# Patient Record
Sex: FEMALE | Race: WHITE | NOT HISPANIC OR LATINO | Employment: OTHER | URBAN - METROPOLITAN AREA
[De-identification: names, ages, dates, MRNs, and addresses within clinical notes are randomized per-mention and may not be internally consistent; named-entity substitution may affect disease eponyms.]

---

## 2017-02-15 ENCOUNTER — CONVERSION ENCOUNTER (OUTPATIENT)
Dept: RHEUMATOLOGY | Facility: CLINIC | Age: 77
End: 2017-02-15

## 2017-06-15 ENCOUNTER — CONVERSION ENCOUNTER (OUTPATIENT)
Dept: RHEUMATOLOGY | Facility: CLINIC | Age: 77
End: 2017-06-15

## 2017-06-17 ENCOUNTER — CONVERSION ENCOUNTER (OUTPATIENT)
Dept: RHEUMATOLOGY | Facility: CLINIC | Age: 77
End: 2017-06-17

## 2017-06-17 LAB
ALBUMIN SERPL-MCNC: 3.8 G/DL (ref 3.6–5.1)
ALBUMIN/GLOB SERPL: ABNORMAL {RATIO} (ref 1–2.5)
ALP SERPL-CCNC: 85 UNITS/L (ref 33–130)
ALT SERPL-CCNC: 19 UNITS/L (ref 6–29)
AST SERPL-CCNC: 19 UNITS/L (ref 10–35)
BILIRUB SERPL-MCNC: 0.5 MG/DL (ref 0.2–1.2)
BUN SERPL-MCNC: 14 MG/DL (ref 7–25)
BUN/CREAT SERPL: ABNORMAL (ref 6–22)
CALCIUM SERPL-MCNC: 9.2 MG/DL (ref 8.6–10.4)
CHLORIDE SERPL-SCNC: 102 MMOL/L (ref 98–110)
CK SERPL-CCNC: 89 UNITS/L (ref 29–143)
CO2 CONTENT VENOUS: 29 MMOL/L (ref 20–31)
CONV TOTAL PROTEIN: 6.2 G/DL (ref 6.1–8.1)
CREAT UR-MCNC: 0.91 MG/DL (ref 0.6–0.93)
CRP SERPL-MCNC: 0.39 MG/DL
CYCLIC CITRULLINATED PEPTIDE ANTIBODY: ABNORMAL
ERYTHROCYTE [DISTWIDTH] IN BLOOD BY AUTOMATED COUNT: 15.1 % (ref 11–15)
ERYTHROCYTE [SEDIMENTATION RATE] IN BLOOD BY WESTERGREN METHOD: 29 MM/HR
GLOBULIN UR ELPH-MCNC: ABNORMAL G/DL (ref 1.9–3.7)
GLUCOSE SERPL-MCNC: 103 MG/DL (ref 65–99)
HCT VFR BLD AUTO: 31.1 % (ref 35–45)
HGB BLD-MCNC: 9.5 G/DL (ref 11.7–15.5)
MCH RBC QN AUTO: 23.6 PG (ref 27–33)
MCHC RBC AUTO-ENTMCNC: ABNORMAL % (ref 32–36)
MCV RBC AUTO: 77.4 FL (ref 80–100)
PLATELET # BLD AUTO: ABNORMAL 10*3/MM3 (ref 140–400)
PMV BLD AUTO: 9.6 FL (ref 7.5–12.5)
POTASSIUM SERPL-SCNC: 4.2 MMOL/L (ref 3.5–5.3)
RBC # BLD AUTO: ABNORMAL 10*6/MM3 (ref 3.8–5.1)
SODIUM SERPL-SCNC: 139 MMOL/L (ref 135–146)
TSH SERPL-ACNC: 1.94 MICROINTL UNITS/ML (ref 0.4–4.5)
URATE SERPL-MCNC: 6.8 MG/DL (ref 2.5–7)
WBC # BLD AUTO: ABNORMAL K/UL (ref 3.8–10.8)

## 2017-06-28 ENCOUNTER — HOSPITAL ENCOUNTER (OUTPATIENT)
Dept: MAMMOGRAPHY | Facility: HOSPITAL | Age: 77
Discharge: HOME | End: 2017-06-28
Attending: INTERNAL MEDICINE | Admitting: INTERNAL MEDICINE

## 2017-07-26 ENCOUNTER — HOSPITAL ENCOUNTER (OUTPATIENT)
Dept: CARDIOLOGY | Facility: HOSPITAL | Age: 77
Discharge: HOME | End: 2017-07-26
Attending: INTERNAL MEDICINE | Admitting: INTERNAL MEDICINE

## 2017-07-26 ENCOUNTER — CONVERSION ENCOUNTER (OUTPATIENT)
Dept: RHEUMATOLOGY | Facility: CLINIC | Age: 77
End: 2017-07-26

## 2017-08-28 ENCOUNTER — CONVERSION ENCOUNTER (OUTPATIENT)
Dept: RHEUMATOLOGY | Facility: CLINIC | Age: 77
End: 2017-08-28

## 2017-09-21 ENCOUNTER — HOSPITAL ENCOUNTER (OUTPATIENT)
Dept: SLEEP MEDICINE | Facility: HOSPITAL | Age: 77
Discharge: HOME | End: 2017-09-21
Attending: INTERNAL MEDICINE | Admitting: INTERNAL MEDICINE

## 2017-09-28 ENCOUNTER — HOSPITAL ENCOUNTER (OUTPATIENT)
Dept: OTHER | Facility: HOSPITAL | Age: 77
Discharge: HOME | End: 2017-09-28
Attending: INTERNAL MEDICINE | Admitting: INTERNAL MEDICINE

## 2017-09-29 ENCOUNTER — CONVERSION ENCOUNTER (OUTPATIENT)
Dept: RHEUMATOLOGY | Facility: CLINIC | Age: 77
End: 2017-09-29

## 2017-10-02 ENCOUNTER — HOSPITAL ENCOUNTER (OUTPATIENT)
Dept: SLEEP MEDICINE | Facility: HOSPITAL | Age: 77
Discharge: HOME | End: 2017-10-02
Attending: INTERNAL MEDICINE | Admitting: INTERNAL MEDICINE

## 2017-10-13 ENCOUNTER — CONVERSION ENCOUNTER (OUTPATIENT)
Dept: RHEUMATOLOGY | Facility: CLINIC | Age: 77
End: 2017-10-13

## 2018-01-11 ENCOUNTER — CONVERSION ENCOUNTER (OUTPATIENT)
Dept: RHEUMATOLOGY | Facility: CLINIC | Age: 78
End: 2018-01-11

## 2018-01-11 LAB
ALBUMIN SERPL-MCNC: 4.4 G/DL (ref 3.6–5.1)
ALBUMIN/GLOB SERPL: ABNORMAL {RATIO} (ref 1–2.5)
ALP SERPL-CCNC: 108 UNITS/L (ref 33–130)
ALT SERPL-CCNC: 15 UNITS/L (ref 6–29)
AST SERPL-CCNC: 19 UNITS/L (ref 10–35)
BILIRUB SERPL-MCNC: 1.1 MG/DL (ref 0.2–1.2)
BUN SERPL-MCNC: 15 MG/DL (ref 7–25)
BUN/CREAT SERPL: ABNORMAL (ref 6–22)
CALCIUM SERPL-MCNC: 9.4 MG/DL (ref 8.6–10.4)
CHLORIDE SERPL-SCNC: 100 MMOL/L (ref 98–110)
CK SERPL-CCNC: 83 UNITS/L (ref 29–143)
CO2 CONTENT VENOUS: 28 MMOL/L (ref 20–31)
CONV TOTAL PROTEIN: 6.9 G/DL (ref 6.1–8.1)
CREAT UR-MCNC: 1.11 MG/DL (ref 0.6–0.93)
CRP SERPL-MCNC: 0.91 MG/DL
CYCLIC CITRULLINATED PEPTIDE ANTIBODY: ABNORMAL
ERYTHROCYTE [DISTWIDTH] IN BLOOD BY AUTOMATED COUNT: 16.3 % (ref 11–15)
ERYTHROCYTE [SEDIMENTATION RATE] IN BLOOD BY WESTERGREN METHOD: 25 MM/HR
GLOBULIN UR ELPH-MCNC: ABNORMAL G/DL (ref 1.9–3.7)
GLUCOSE SERPL-MCNC: 106 MG/DL (ref 65–99)
HCT VFR BLD AUTO: 30.1 % (ref 35–45)
HGB BLD-MCNC: 8.9 G/DL (ref 11.7–15.5)
MCH RBC QN AUTO: 21.1 PG (ref 27–33)
MCHC RBC AUTO-ENTMCNC: ABNORMAL % (ref 32–36)
MCV RBC AUTO: 71.3 FL (ref 80–100)
PLATELET # BLD AUTO: ABNORMAL 10*3/MM3 (ref 140–400)
PMV BLD AUTO: 9.6 FL (ref 7.5–12.5)
POTASSIUM SERPL-SCNC: 3.9 MMOL/L (ref 3.5–5.3)
RBC # BLD AUTO: ABNORMAL 10*6/MM3 (ref 3.8–5.1)
SODIUM SERPL-SCNC: 139 MMOL/L (ref 135–146)
URATE SERPL-MCNC: 8.3 MG/DL (ref 2.5–7)
WBC # BLD AUTO: ABNORMAL K/UL (ref 3.8–10.8)

## 2018-01-18 ENCOUNTER — CONVERSION ENCOUNTER (OUTPATIENT)
Dept: RHEUMATOLOGY | Facility: CLINIC | Age: 78
End: 2018-01-18

## 2018-03-15 ENCOUNTER — CONVERSION ENCOUNTER (OUTPATIENT)
Dept: RHEUMATOLOGY | Facility: CLINIC | Age: 78
End: 2018-03-15

## 2018-08-15 LAB
ALBUMIN SERPL-MCNC: 4.3 G/DL (ref 3.6–5.1)
ALBUMIN/GLOB SERPL: ABNORMAL {RATIO} (ref 1–2.5)
ALP SERPL-CCNC: 94 UNITS/L (ref 33–130)
ALT SERPL-CCNC: 24 UNITS/L (ref 6–29)
AST SERPL-CCNC: 27 UNITS/L (ref 10–35)
BASOPHILS # BLD AUTO: ABNORMAL 10*3/MM3 (ref 0–200)
BASOPHILS NFR BLD AUTO: 0.3 %
BILIRUB SERPL-MCNC: 0.8 MG/DL (ref 0.2–1.2)
BUN SERPL-MCNC: 10 MG/DL (ref 7–25)
BUN/CREAT SERPL: ABNORMAL (ref 6–22)
CALCIUM SERPL-MCNC: 9.2 MG/DL (ref 8.6–10.4)
CHLORIDE SERPL-SCNC: 99 MMOL/L (ref 98–110)
CO2 CONTENT VENOUS: 29 MMOL/L (ref 20–32)
CONV NEUTROPHILS/100 LEUKOCYTES IN BODY FLUID BY MANUAL COUNT: 70.7 %
CONV TOTAL PROTEIN: 6.9 G/DL (ref 6.1–8.1)
CREAT UR-MCNC: 0.86 MG/DL (ref 0.6–0.93)
CRP SERPL-MCNC: 0.53 MG/DL
EOSINOPHIL # BLD AUTO: 2.1 %
EOSINOPHIL # BLD AUTO: ABNORMAL 10*3/MM3 (ref 15–500)
ERYTHROCYTE [DISTWIDTH] IN BLOOD BY AUTOMATED COUNT: 14.9 % (ref 11–15)
ERYTHROCYTE [SEDIMENTATION RATE] IN BLOOD BY WESTERGREN METHOD: 28 MM/HR
GLOBULIN UR ELPH-MCNC: ABNORMAL G/DL (ref 1.9–3.7)
GLUCOSE SERPL-MCNC: 102 MG/DL (ref 65–99)
HCT VFR BLD AUTO: 40.8 % (ref 35–45)
HGB BLD-MCNC: 13.5 G/DL (ref 11.7–15.5)
LYMPHOCYTES # BLD AUTO: ABNORMAL 10*3/MM3 (ref 850–3900)
LYMPHOCYTES NFR BLD AUTO: 19.2 %
MCH RBC QN AUTO: 30.6 PG (ref 27–33)
MCHC RBC AUTO-ENTMCNC: ABNORMAL % (ref 32–36)
MCV RBC AUTO: 92.5 FL (ref 80–100)
MONOCYTES # BLD AUTO: ABNORMAL 10*3/MICROLITER (ref 200–950)
MONOCYTES NFR BLD AUTO: 7.7 %
NEUTROPHILS # BLD AUTO: ABNORMAL 10*3/MM3 (ref 1500–7800)
PLATELET # BLD AUTO: ABNORMAL 10*3/MM3 (ref 140–400)
PMV BLD AUTO: 9.9 FL (ref 7.5–12.5)
POTASSIUM SERPL-SCNC: 3.7 MMOL/L (ref 3.5–5.3)
RBC # BLD AUTO: ABNORMAL 10*6/MM3 (ref 3.8–5.1)
SODIUM SERPL-SCNC: 140 MMOL/L (ref 135–146)
WBC # BLD AUTO: ABNORMAL K/UL (ref 3.8–10.8)

## 2018-08-21 ENCOUNTER — CONVERSION ENCOUNTER (OUTPATIENT)
Dept: RHEUMATOLOGY | Facility: CLINIC | Age: 78
End: 2018-08-21

## 2018-08-21 LAB — URATE SERPL-MCNC: 6.3 MG/DL (ref 2.5–7)

## 2018-11-14 ENCOUNTER — CONVERSION ENCOUNTER (OUTPATIENT)
Dept: RHEUMATOLOGY | Facility: CLINIC | Age: 78
End: 2018-11-14

## 2019-03-06 LAB
ALBUMIN SERPL-MCNC: 3.9 G/DL (ref 3.6–5.1)
ALBUMIN/GLOB SERPL: ABNORMAL {RATIO} (ref 1–2.5)
ALP SERPL-CCNC: 89 UNITS/L (ref 33–130)
ALT SERPL-CCNC: 15 UNITS/L (ref 6–29)
AST SERPL-CCNC: 17 UNITS/L (ref 10–35)
BASOPHILS # BLD AUTO: ABNORMAL 10*3/MM3 (ref 0–200)
BASOPHILS NFR BLD AUTO: 0.2 %
BILIRUB SERPL-MCNC: 0.9 MG/DL (ref 0.2–1.2)
BUN SERPL-MCNC: 16 MG/DL (ref 7–25)
BUN/CREAT SERPL: ABNORMAL (ref 6–22)
CALCIUM SERPL-MCNC: 9.2 MG/DL (ref 8.6–10.4)
CHLORIDE SERPL-SCNC: 102 MMOL/L (ref 98–110)
CO2 CONTENT VENOUS: 26 MMOL/L (ref 20–32)
CONV NEUTROPHILS/100 LEUKOCYTES IN BODY FLUID BY MANUAL COUNT: 74.9 %
CONV TOTAL PROTEIN: 6.2 G/DL (ref 6.1–8.1)
CREAT UR-MCNC: 0.8 MG/DL (ref 0.6–0.93)
CRP SERPL-MCNC: 0.41 MG/DL
EOSINOPHIL # BLD AUTO: 1.8 %
EOSINOPHIL # BLD AUTO: ABNORMAL 10*3/MM3 (ref 15–500)
ERYTHROCYTE [DISTWIDTH] IN BLOOD BY AUTOMATED COUNT: 14.4 % (ref 11–15)
ERYTHROCYTE [SEDIMENTATION RATE] IN BLOOD BY WESTERGREN METHOD: 22 MM/HR
GLOBULIN UR ELPH-MCNC: ABNORMAL G/DL (ref 1.9–3.7)
GLUCOSE SERPL-MCNC: 89 MG/DL (ref 65–99)
HCT VFR BLD AUTO: 30.4 % (ref 35–45)
HGB BLD-MCNC: 10 G/DL (ref 11.7–15.5)
LYMPHOCYTES # BLD AUTO: ABNORMAL 10*3/MM3 (ref 850–3900)
LYMPHOCYTES NFR BLD AUTO: 13.5 %
MCH RBC QN AUTO: 27.8 PG (ref 27–33)
MCHC RBC AUTO-ENTMCNC: ABNORMAL % (ref 32–36)
MCV RBC AUTO: 84.4 FL (ref 80–100)
MONOCYTES # BLD AUTO: ABNORMAL 10*3/MICROLITER (ref 200–950)
MONOCYTES NFR BLD AUTO: 9.6 %
NEUTROPHILS # BLD AUTO: ABNORMAL 10*3/MM3 (ref 1500–7800)
PLATELET # BLD AUTO: ABNORMAL 10*3/MM3 (ref 140–400)
PMV BLD AUTO: 10 FL (ref 7.5–12.5)
POTASSIUM SERPL-SCNC: 4.4 MMOL/L (ref 3.5–5.3)
RBC # BLD AUTO: ABNORMAL 10*6/MM3 (ref 3.8–5.1)
SODIUM SERPL-SCNC: 137 MMOL/L (ref 135–146)
URATE SERPL-MCNC: 7.4 MG/DL (ref 2.5–7)
WBC # BLD AUTO: ABNORMAL K/UL (ref 3.8–10.8)

## 2019-03-14 ENCOUNTER — CONVERSION ENCOUNTER (OUTPATIENT)
Dept: RHEUMATOLOGY | Facility: CLINIC | Age: 79
End: 2019-03-14

## 2019-05-17 ENCOUNTER — HOSPITAL ENCOUNTER (OUTPATIENT)
Dept: LAB | Facility: HOSPITAL | Age: 79
Discharge: HOME | End: 2019-05-17
Attending: PHYSICIAN ASSISTANT | Admitting: PHYSICIAN ASSISTANT

## 2019-05-17 LAB
ALBUMIN SERPL-MCNC: 3.9 G/DL (ref 3.5–4.8)
ALBUMIN/GLOB SERPL: 1.4 {RATIO} (ref 1–1.7)
ALP SERPL-CCNC: 72 IU/L (ref 32–91)
ALT SERPL-CCNC: 16 IU/L (ref 14–54)
ANION GAP SERPL CALC-SCNC: 17.1 MMOL/L (ref 10–20)
AST SERPL-CCNC: 20 IU/L (ref 15–41)
BASOPHILS # BLD AUTO: 0 10*3/UL (ref 0–0.2)
BASOPHILS NFR BLD AUTO: 0 % (ref 0–2)
BILIRUB SERPL-MCNC: 1 MG/DL (ref 0.3–1.2)
BNP SERPL-MCNC: 353 PG/ML
BUN SERPL-MCNC: 15 MG/DL (ref 8–20)
BUN/CREAT SERPL: 16.7 (ref 5.4–26.2)
CALCIUM SERPL-MCNC: 8.4 MG/DL (ref 8.9–10.3)
CHLORIDE SERPL-SCNC: 99 MMOL/L (ref 101–111)
CK MB CFR SERPL ELPH: 1.4 NG/ML (ref 0.6–6.3)
CONV CO2: 23 MMOL/L (ref 22–32)
CONV TOTAL PROTEIN: 6.7 G/DL (ref 6.1–7.9)
CREAT UR-MCNC: 0.9 MG/DL (ref 0.4–1)
D DIMER PPP FEU-MCNC: 0.35 MG/L FEU (ref 0.17–0.59)
DIFFERENTIAL METHOD BLD: (no result)
EOSINOPHIL # BLD AUTO: 0.1 10*3/UL (ref 0–0.3)
EOSINOPHIL # BLD AUTO: 2 % (ref 0–3)
ERYTHROCYTE [DISTWIDTH] IN BLOOD BY AUTOMATED COUNT: 15.6 % (ref 11.5–14.5)
GLOBULIN UR ELPH-MCNC: 2.8 G/DL (ref 2.5–3.8)
GLUCOSE SERPL-MCNC: 100 MG/DL (ref 65–99)
HCT VFR BLD AUTO: 30.2 % (ref 35–49)
HGB BLD-MCNC: 9.6 G/DL (ref 12–15)
LYMPHOCYTES # BLD AUTO: 0.7 10*3/UL (ref 0.8–4.8)
LYMPHOCYTES NFR BLD AUTO: 12 % (ref 18–42)
MCH RBC QN AUTO: 25.1 PG (ref 26–32)
MCHC RBC AUTO-ENTMCNC: 31.7 G/DL (ref 32–36)
MCV RBC AUTO: 79.1 FL (ref 80–94)
MONOCYTES # BLD AUTO: 0.6 10*3/UL (ref 0.1–1.3)
MONOCYTES NFR BLD AUTO: 10 % (ref 2–11)
NEUTROPHILS # BLD AUTO: 4.5 10*3/UL (ref 2.3–8.6)
NEUTROPHILS NFR BLD AUTO: 76 % (ref 50–75)
NRBC BLD AUTO-RTO: 0 /100{WBCS}
NRBC/RBC NFR BLD MANUAL: 0 10*3/UL
PLATELET # BLD AUTO: 269 10*3/UL (ref 150–450)
PMV BLD AUTO: 7 FL (ref 7.4–10.4)
POTASSIUM SERPL-SCNC: 4.1 MMOL/L (ref 3.6–5.1)
RBC # BLD AUTO: 3.81 10*6/UL (ref 4–5.4)
SODIUM SERPL-SCNC: 135 MMOL/L (ref 136–144)
TROPONIN I SERPL-MCNC: <0.03 NG/ML (ref 0–0.03)
WBC # BLD AUTO: 5.9 10*3/UL (ref 4.5–11.5)

## 2019-06-04 VITALS
DIASTOLIC BLOOD PRESSURE: 81 MMHG | SYSTOLIC BLOOD PRESSURE: 105 MMHG | HEIGHT: 65 IN | HEIGHT: 65 IN | BODY MASS INDEX: 48.82 KG/M2 | BODY MASS INDEX: 48.82 KG/M2 | SYSTOLIC BLOOD PRESSURE: 115 MMHG | WEIGHT: 293 LBS | DIASTOLIC BLOOD PRESSURE: 81 MMHG | DIASTOLIC BLOOD PRESSURE: 79 MMHG | DIASTOLIC BLOOD PRESSURE: 50 MMHG | HEART RATE: 93 BPM | HEART RATE: 65 BPM | HEART RATE: 80 BPM | HEART RATE: 76 BPM | WEIGHT: 283 LBS | SYSTOLIC BLOOD PRESSURE: 136 MMHG | WEIGHT: 293 LBS | RESPIRATION RATE: 20 BRPM | HEART RATE: 74 BPM | BODY MASS INDEX: 49.46 KG/M2 | WEIGHT: 293 LBS | HEART RATE: 83 BPM | WEIGHT: 293 LBS | DIASTOLIC BLOOD PRESSURE: 77 MMHG | SYSTOLIC BLOOD PRESSURE: 135 MMHG | DIASTOLIC BLOOD PRESSURE: 76 MMHG | SYSTOLIC BLOOD PRESSURE: 147 MMHG | SYSTOLIC BLOOD PRESSURE: 129 MMHG | WEIGHT: 276 LBS | DIASTOLIC BLOOD PRESSURE: 82 MMHG | WEIGHT: 293 LBS | WEIGHT: 293 LBS | DIASTOLIC BLOOD PRESSURE: 73 MMHG | BODY MASS INDEX: 47.15 KG/M2 | HEIGHT: 65 IN | SYSTOLIC BLOOD PRESSURE: 176 MMHG | SYSTOLIC BLOOD PRESSURE: 134 MMHG | HEIGHT: 65 IN | HEART RATE: 69 BPM | SYSTOLIC BLOOD PRESSURE: 136 MMHG | HEART RATE: 80 BPM | HEIGHT: 65 IN | BODY MASS INDEX: 48.82 KG/M2 | WEIGHT: 293 LBS | HEART RATE: 67 BPM | OXYGEN SATURATION: 97 % | BODY MASS INDEX: 48.82 KG/M2 | DIASTOLIC BLOOD PRESSURE: 93 MMHG | WEIGHT: 293 LBS

## 2019-06-04 VITALS
SYSTOLIC BLOOD PRESSURE: 158 MMHG | DIASTOLIC BLOOD PRESSURE: 82 MMHG | HEART RATE: 61 BPM | BODY MASS INDEX: 46.98 KG/M2 | HEIGHT: 65 IN | HEART RATE: 54 BPM | WEIGHT: 285.8 LBS | WEIGHT: 282 LBS | OXYGEN SATURATION: 99 % | SYSTOLIC BLOOD PRESSURE: 137 MMHG | DIASTOLIC BLOOD PRESSURE: 71 MMHG | BODY MASS INDEX: 47.56 KG/M2

## 2019-06-19 ENCOUNTER — TRANSCRIBE ORDERS (OUTPATIENT)
Dept: ADMINISTRATIVE | Facility: HOSPITAL | Age: 79
End: 2019-06-19

## 2019-06-19 DIAGNOSIS — M81.0 AGE RELATED OSTEOPOROSIS, UNSPECIFIED PATHOLOGICAL FRACTURE PRESENCE: Primary | ICD-10-CM

## 2019-07-05 ENCOUNTER — HOSPITAL ENCOUNTER (OUTPATIENT)
Dept: BONE DENSITY | Facility: HOSPITAL | Age: 79
Discharge: HOME | End: 2019-07-05
Admitting: NURSE PRACTITIONER

## 2019-07-05 DIAGNOSIS — M81.0 AGE RELATED OSTEOPOROSIS, UNSPECIFIED PATHOLOGICAL FRACTURE PRESENCE: ICD-10-CM

## 2019-07-05 PROCEDURE — 77080 DXA BONE DENSITY AXIAL: CPT

## 2019-07-09 ENCOUNTER — OFFICE (AMBULATORY)
Dept: URBAN - METROPOLITAN AREA CLINIC 64 | Facility: CLINIC | Age: 79
End: 2019-07-09

## 2019-07-09 VITALS
HEART RATE: 54 BPM | HEIGHT: 66 IN | DIASTOLIC BLOOD PRESSURE: 71 MMHG | SYSTOLIC BLOOD PRESSURE: 124 MMHG | WEIGHT: 290 LBS

## 2019-07-09 DIAGNOSIS — D64.9 ANEMIA, UNSPECIFIED: ICD-10-CM

## 2019-07-09 DIAGNOSIS — R10.84 GENERALIZED ABDOMINAL PAIN: ICD-10-CM

## 2019-07-09 PROCEDURE — 99214 OFFICE O/P EST MOD 30 MIN: CPT | Performed by: NURSE PRACTITIONER

## 2019-08-06 PROBLEM — I73.9 PERIPHERAL VASCULAR DISEASE (HCC): Status: ACTIVE | Noted: 2019-08-06

## 2019-08-06 RX ORDER — HYDROXYCHLOROQUINE SULFATE 200 MG/1
TABLET, FILM COATED ORAL 2 TIMES DAILY
COMMUNITY
Start: 2019-06-13 | End: 2019-09-24

## 2019-08-06 RX ORDER — ACETAMINOPHEN 500 MG
2 TABLET ORAL DAILY
COMMUNITY
End: 2019-09-24

## 2019-08-06 RX ORDER — FEBUXOSTAT 40 MG/1
TABLET ORAL DAILY
COMMUNITY
Start: 2019-05-31 | End: 2019-09-24

## 2019-08-06 RX ORDER — BUMETANIDE 1 MG/1
2 TABLET ORAL DAILY
COMMUNITY
Start: 2019-06-13

## 2019-08-06 RX ORDER — AMLODIPINE BESYLATE 10 MG/1
TABLET ORAL DAILY
COMMUNITY
Start: 2019-05-07 | End: 2020-06-11

## 2019-08-06 RX ORDER — OMEPRAZOLE 40 MG/1
CAPSULE, DELAYED RELEASE ORAL DAILY
COMMUNITY
Start: 2019-05-24 | End: 2020-06-11

## 2019-08-06 RX ORDER — CHOLECALCIFEROL (VITAMIN D3) 25 MCG
1000 TABLET,CHEWABLE ORAL
COMMUNITY
Start: 2014-03-24 | End: 2020-07-25

## 2019-08-06 RX ORDER — RIVAROXABAN 20 MG/1
TABLET, FILM COATED ORAL
COMMUNITY
Start: 2019-06-13 | End: 2020-06-08

## 2019-08-06 RX ORDER — ESCITALOPRAM OXALATE 10 MG/1
10 TABLET ORAL DAILY
COMMUNITY
Start: 2019-04-30 | End: 2021-07-19

## 2019-08-06 RX ORDER — LEVOTHYROXINE SODIUM 100 MCG
100 TABLET ORAL DAILY
COMMUNITY
Start: 2019-05-31 | End: 2019-09-24 | Stop reason: SDUPTHER

## 2019-08-06 RX ORDER — GABAPENTIN 100 MG/1
CAPSULE ORAL
COMMUNITY
Start: 2019-05-23 | End: 2019-08-27

## 2019-08-06 RX ORDER — METOPROLOL TARTRATE 100 MG/1
TABLET ORAL
COMMUNITY
Start: 2014-03-24 | End: 2021-07-19

## 2019-08-06 RX ORDER — VALSARTAN 160 MG/1
320 TABLET ORAL DAILY
COMMUNITY
Start: 2019-05-16 | End: 2020-06-11

## 2019-08-06 RX ORDER — NITROFURANTOIN 25; 75 MG/1; MG/1
CAPSULE ORAL
Refills: 0 | COMMUNITY
Start: 2019-05-09 | End: 2019-08-27

## 2019-08-06 RX ORDER — POTASSIUM CHLORIDE 750 MG/1
10 TABLET, FILM COATED, EXTENDED RELEASE ORAL DAILY
COMMUNITY
Start: 2019-06-13

## 2019-08-06 RX ORDER — CLONIDINE HYDROCHLORIDE 0.1 MG/1
TABLET ORAL
COMMUNITY
Start: 2022-03-11

## 2019-08-06 RX ORDER — FERROUS SULFATE 325(65) MG
325 TABLET ORAL
COMMUNITY
Start: 2018-10-12 | End: 2021-07-19

## 2019-08-06 RX ORDER — OMEPRAZOLE 20 MG/1
CAPSULE, DELAYED RELEASE ORAL
COMMUNITY
Start: 2014-03-24 | End: 2019-08-27

## 2019-08-06 RX ORDER — PRAVASTATIN SODIUM 40 MG
40 TABLET ORAL NIGHTLY
COMMUNITY
Start: 2014-03-24

## 2019-08-06 RX ORDER — ACETAMINOPHEN 160 MG
2000 TABLET,DISINTEGRATING ORAL
COMMUNITY
Start: 2014-03-24

## 2019-08-06 RX ORDER — CETIRIZINE HYDROCHLORIDE 10 MG/1
10 TABLET ORAL DAILY
COMMUNITY
Start: 2014-03-24 | End: 2019-08-27

## 2019-08-07 ENCOUNTER — OFFICE (AMBULATORY)
Dept: URBAN - METROPOLITAN AREA PATHOLOGY 4 | Facility: PATHOLOGY | Age: 79
End: 2019-08-07

## 2019-08-07 ENCOUNTER — LAB REQUISITION (OUTPATIENT)
Dept: LAB | Facility: HOSPITAL | Age: 79
End: 2019-08-07

## 2019-08-07 ENCOUNTER — ON CAMPUS - OUTPATIENT (AMBULATORY)
Dept: URBAN - METROPOLITAN AREA HOSPITAL 2 | Facility: HOSPITAL | Age: 79
End: 2019-08-07

## 2019-08-07 VITALS
HEART RATE: 59 BPM | DIASTOLIC BLOOD PRESSURE: 84 MMHG | HEART RATE: 58 BPM | RESPIRATION RATE: 18 BRPM | OXYGEN SATURATION: 100 % | SYSTOLIC BLOOD PRESSURE: 190 MMHG | OXYGEN SATURATION: 97 % | HEART RATE: 54 BPM | RESPIRATION RATE: 16 BRPM | OXYGEN SATURATION: 99 % | SYSTOLIC BLOOD PRESSURE: 113 MMHG | SYSTOLIC BLOOD PRESSURE: 120 MMHG | DIASTOLIC BLOOD PRESSURE: 86 MMHG | HEART RATE: 63 BPM | SYSTOLIC BLOOD PRESSURE: 161 MMHG | SYSTOLIC BLOOD PRESSURE: 114 MMHG | HEART RATE: 68 BPM | SYSTOLIC BLOOD PRESSURE: 128 MMHG | DIASTOLIC BLOOD PRESSURE: 72 MMHG | DIASTOLIC BLOOD PRESSURE: 61 MMHG | SYSTOLIC BLOOD PRESSURE: 138 MMHG | HEART RATE: 64 BPM | WEIGHT: 281 LBS | SYSTOLIC BLOOD PRESSURE: 125 MMHG | OXYGEN SATURATION: 98 % | OXYGEN SATURATION: 95 % | HEART RATE: 66 BPM | OXYGEN SATURATION: 92 % | DIASTOLIC BLOOD PRESSURE: 58 MMHG | HEART RATE: 57 BPM | SYSTOLIC BLOOD PRESSURE: 145 MMHG | TEMPERATURE: 97.8 F | DIASTOLIC BLOOD PRESSURE: 71 MMHG | DIASTOLIC BLOOD PRESSURE: 68 MMHG | SYSTOLIC BLOOD PRESSURE: 121 MMHG | HEART RATE: 70 BPM | DIASTOLIC BLOOD PRESSURE: 77 MMHG | HEIGHT: 66 IN | DIASTOLIC BLOOD PRESSURE: 82 MMHG | DIASTOLIC BLOOD PRESSURE: 88 MMHG

## 2019-08-07 DIAGNOSIS — D12.5 BENIGN NEOPLASM OF SIGMOID COLON: ICD-10-CM

## 2019-08-07 DIAGNOSIS — K64.0 FIRST DEGREE HEMORRHOIDS: ICD-10-CM

## 2019-08-07 DIAGNOSIS — D12.2 BENIGN NEOPLASM OF ASCENDING COLON: ICD-10-CM

## 2019-08-07 DIAGNOSIS — D50.0 IRON DEFICIENCY ANEMIA DUE TO CHRONIC BLOOD LOSS: ICD-10-CM

## 2019-08-07 DIAGNOSIS — K29.50 UNSPECIFIED CHRONIC GASTRITIS WITHOUT BLEEDING: ICD-10-CM

## 2019-08-07 DIAGNOSIS — K31.7 POLYP OF STOMACH AND DUODENUM: ICD-10-CM

## 2019-08-07 DIAGNOSIS — K57.30 DIVERTICULOSIS OF LARGE INTESTINE WITHOUT PERFORATION OR ABSCESS WITHOUT BLEEDING: ICD-10-CM

## 2019-08-07 DIAGNOSIS — D17.79 BENIGN LIPOMATOUS NEOPLASM OF OTHER SITES: ICD-10-CM

## 2019-08-07 DIAGNOSIS — R10.84 GENERALIZED ABDOMINAL PAIN: ICD-10-CM

## 2019-08-07 DIAGNOSIS — K63.5 POLYP OF COLON: ICD-10-CM

## 2019-08-07 DIAGNOSIS — K31.89 OTHER DISEASES OF STOMACH AND DUODENUM: ICD-10-CM

## 2019-08-07 DIAGNOSIS — D50.0 IRON DEFICIENCY ANEMIA SECONDARY TO BLOOD LOSS (CHRONIC): ICD-10-CM

## 2019-08-07 DIAGNOSIS — C18.2 MALIGNANT NEOPLASM OF ASCENDING COLON: ICD-10-CM

## 2019-08-07 DIAGNOSIS — K56.609 UNSPECIFIED INTESTINAL OBSTRUCTION, UNSPECIFIED AS TO PARTIA: ICD-10-CM

## 2019-08-07 DIAGNOSIS — K57.30 DIVERTICULOSIS OF LARGE INTESTINE WITHOUT PERFORATION OR ABS: ICD-10-CM

## 2019-08-07 PROBLEM — C18.4 MALIGNANT NEOPLASM OF TRANSVERSE COLON: Status: ACTIVE | Noted: 2019-08-07

## 2019-08-07 LAB
GI HISTOLOGY: A. UNSPECIFIED: (no result)
GI HISTOLOGY: B. SELECT: (no result)
GI HISTOLOGY: C. SELECT: (no result)
GI HISTOLOGY: D. UNSPECIFIED: (no result)
GI HISTOLOGY: E. UNSPECIFIED: (no result)
GI HISTOLOGY: PDF REPORT: (no result)

## 2019-08-07 PROCEDURE — 88342 IMHCHEM/IMCYTCHM 1ST ANTB: CPT

## 2019-08-07 PROCEDURE — 45385 COLONOSCOPY W/LESION REMOVAL: CPT | Performed by: INTERNAL MEDICINE

## 2019-08-07 PROCEDURE — 88341 IMHCHEM/IMCYTCHM EA ADD ANTB: CPT

## 2019-08-07 PROCEDURE — 88342 IMHCHEM/IMCYTCHM 1ST ANTB: CPT | Performed by: INTERNAL MEDICINE

## 2019-08-07 PROCEDURE — 88305 TISSUE EXAM BY PATHOLOGIST: CPT | Performed by: INTERNAL MEDICINE

## 2019-08-07 PROCEDURE — 43239 EGD BIOPSY SINGLE/MULTIPLE: CPT | Performed by: INTERNAL MEDICINE

## 2019-08-07 PROCEDURE — 45380 COLONOSCOPY AND BIOPSY: CPT | Mod: 59 | Performed by: INTERNAL MEDICINE

## 2019-08-07 PROCEDURE — 88305 TISSUE EXAM BY PATHOLOGIST: CPT | Mod: 26 | Performed by: INTERNAL MEDICINE

## 2019-08-07 PROCEDURE — 45381 COLONOSCOPY SUBMUCOUS NJX: CPT | Performed by: INTERNAL MEDICINE

## 2019-08-12 LAB
LAB AP CASE REPORT: NORMAL
LAB AP DIAGNOSIS COMMENT: NORMAL
PATH REPORT.ADDENDUM SPEC: NORMAL
PATH REPORT.FINAL DX SPEC: NORMAL
PATH REPORT.GROSS SPEC: NORMAL

## 2019-08-13 ENCOUNTER — OFFICE VISIT (OUTPATIENT)
Dept: SURGERY | Facility: CLINIC | Age: 79
End: 2019-08-13

## 2019-08-13 ENCOUNTER — PREP FOR SURGERY (OUTPATIENT)
Dept: OTHER | Facility: HOSPITAL | Age: 79
End: 2019-08-13

## 2019-08-13 VITALS
TEMPERATURE: 97.6 F | OXYGEN SATURATION: 97 % | DIASTOLIC BLOOD PRESSURE: 66 MMHG | HEART RATE: 59 BPM | SYSTOLIC BLOOD PRESSURE: 125 MMHG | HEIGHT: 63 IN | BODY MASS INDEX: 49.36 KG/M2 | WEIGHT: 278.6 LBS

## 2019-08-13 DIAGNOSIS — C18.2 MALIGNANT NEOPLASM OF ASCENDING COLON (HCC): Primary | ICD-10-CM

## 2019-08-13 PROBLEM — C18.9 COLON CANCER: Status: ACTIVE | Noted: 2019-08-13

## 2019-08-13 PROCEDURE — 99204 OFFICE O/P NEW MOD 45 MIN: CPT | Performed by: SURGERY

## 2019-08-13 RX ORDER — IRBESARTAN 150 MG/1
150 TABLET ORAL NIGHTLY
COMMUNITY
End: 2019-08-27

## 2019-08-13 RX ORDER — SODIUM CHLORIDE 9 MG/ML
100 INJECTION, SOLUTION INTRAVENOUS CONTINUOUS
Status: CANCELLED | OUTPATIENT
Start: 2019-08-13

## 2019-08-13 NOTE — H&P
Subjective   Yasmeen Rahman is a 79 y.o. female.     History of present illness  Yasmeen is a pleasant 79-year-old female seen in the office today at the request of Dr. Aayush Maurer for newly diagnosed ascending colon carcinoma.  It is an invasive moderately differentiated carcinoma.  She also had several of the polyps removed at the time of surgery.  She also had an EGD done.  Her CT scan shows no evidence of distant spread.  There are no abnormally enlarged lymph nodes either.    In the office today we have discussed a right-handed colectomy in detail.  We discussed the prep for that.  We also discussed the expected hospital stay staging and possibly for needing chemotherapy postop.  She sees oncology next week and her cardiologist next week as well.  She has a history of atrial fibrillation on the baby aspirin and Xarelto.  Will need to stop this preoperatively for 2 days.  Had endoscopy because she was found to be anemic.  She was started on iron replacement and anemia persisted so endoscopy was recommended by Dr. Figueredo her urologist.  He had done a bladder procedure.    No past medical history on file.    No past surgical history on file.    Outpatient Encounter Medications as of 8/13/2019   Medication Sig Dispense Refill   • Cranberry 125 MG tablet Take  by mouth.     • irbesartan (AVAPRO) 150 MG tablet Take 150 mg by mouth Every Night.       No facility-administered encounter medications on file as of 8/13/2019.        Allergies   Allergen Reactions   • Codeine Nausea And Vomiting   • Hydrocodone-Acetaminophen Nausea And Vomiting     Nausea,vomiting       No family history on file.    Social History     Socioeconomic History   • Marital status:      Spouse name: Not on file   • Number of children: Not on file   • Years of education: Not on file   • Highest education level: Not on file       The following portions of the patient's history were reviewed and updated as appropriate:  allergies, current medications, past family history, past medical history, past social history, past surgical history and problem list.    Objective       Assessment/Plan   There are no diagnoses linked to this encounter.      Complete review of systems is done and unremarkable with exception of the chief complaint.    Physical exam shows a obese 79-year-old female.  HEENT is negative.  Heart is regular.  Lungs are clear.  Abdomen is obese.  Nontender.  Extremity show equal range of motion in the upper and lower extremities.  She has symmetrical strength and usage.  Neuro shows no obvious focal deficit.    Impression: Ascending colon cancer      Plan: Open right colectomy after 2-day [place] bowel prep      Dominick Cunha,   8/13/2019  9:20 AM

## 2019-08-13 NOTE — PROGRESS NOTES
Subjective   Yasmeen Rahman is a 79 y.o. female.     History of present illness  Yasmeen is a pleasant 79-year-old female seen in the office today at the request of Dr. Aayush Maurer for newly diagnosed ascending colon carcinoma.  It is an invasive moderately differentiated carcinoma.  She also had several of the polyps removed at the time of surgery.  She also had an EGD done.  Her CT scan shows no evidence of distant spread.  There are no abnormally enlarged lymph nodes either.    In the office today we have discussed a right-handed colectomy in detail.  We discussed the prep for that.  We also discussed the expected hospital stay staging and possibly for needing chemotherapy postop.  She sees oncology next week and her cardiologist next week as well.  She has a history of atrial fibrillation on the baby aspirin and Xarelto.  Will need to stop this preoperatively for 2 days.  Had endoscopy because she was found to be anemic.  She was started on iron replacement and anemia persisted so endoscopy was recommended by Dr. Figueredo her urologist.  He had done a bladder procedure.    No past medical history on file.    No past surgical history on file.    Outpatient Encounter Medications as of 8/13/2019   Medication Sig Dispense Refill   • acetaminophen (TYLENOL) 500 MG tablet Take 2 tablets by mouth Daily.     • amLODIPine (NORVASC) 10 MG tablet      • aspirin 81 MG tablet Take 81 mg by mouth Daily.     • b complex vitamins tablet B COMPLEX TABS     • bumetanide (BUMEX) 2 MG tablet      • Calcium Carbonate-Vitamin D (CALCIUM 600+D) 600-200 MG-UNIT tablet Take 1,200 mg by mouth Daily.     • CALCIUM PO CALCIUM CAPS     • cetirizine (zyrTEC) 10 MG tablet Take 10 mg by mouth Daily.     • Cholecalciferol (VITAMIN D3) 2000 units capsule VITAMIN D3 2000 UNIT CAPS     • CloNIDine (CATAPRES) 0.1 MG tablet TAKE 1 TABLET TWICE A DAY     • Cranberry 125 MG tablet Take  by mouth.     • cyanocobalamin (VITAMIN B-12) 2500  MCG tablet tablet B-12 2500 MCG TABS     • escitalopram (LEXAPRO) 10 MG tablet      • ferrous sulfate 325 (65 FE) MG tablet TAKE 1 TABLET BY MOUTH 2 TIMES DAILY WITH MEALS     • gabapentin (NEURONTIN) 100 MG capsule      • hydroxychloroquine (PLAQUENIL) 200 MG tablet      • irbesartan (AVAPRO) 150 MG tablet Take 150 mg by mouth Every Night.     • metoprolol tartrate (LOPRESSOR) 100 MG tablet TAKE 1 TABLET TWICE A DAY     • omeprazole (priLOSEC) 20 MG capsule TAKE 1 CAPSULE DAILY     • potassium chloride (K-DUR) 10 MEQ CR tablet      • pravastatin (PRAVACHOL) 40 MG tablet Take 40 mg by mouth Daily.     • SYNTHROID 100 MCG tablet      • ULORIC 40 MG tablet      • valsartan (DIOVAN) 160 MG tablet      • XARELTO 20 MG tablet        No facility-administered encounter medications on file as of 8/13/2019.        Allergies   Allergen Reactions   • Codeine Nausea And Vomiting   • Hydrocodone-Acetaminophen Nausea And Vomiting     Nausea,vomiting       No family history on file.    Social History     Socioeconomic History   • Marital status:      Spouse name: Not on file   • Number of children: Not on file   • Years of education: Not on file   • Highest education level: Not on file       The following portions of the patient's history were reviewed and updated as appropriate: allergies, current medications, past family history, past medical history, past social history, past surgical history and problem list.    Objective       Assessment/Plan   There are no diagnoses linked to this encounter.      Complete review of systems is done and unremarkable with exception of the chief complaint.    Physical exam shows a obese 79-year-old female.  HEENT is negative.  Heart is regular.  Lungs are clear.  Abdomen is obese.  Nontender.  Extremity show equal range of motion in the upper and lower extremities.  She has symmetrical strength and usage.  Neuro shows no obvious focal deficit.    Impression: Ascending colon cancer       Plan: Open right colectomy after 2-day [facility] [place] bowel prep      Dominick Cunha DO  8/13/2019  9:14 AM

## 2019-08-15 DIAGNOSIS — M19.90 INFLAMMATORY ARTHRITIS: Primary | ICD-10-CM

## 2019-08-15 RX ORDER — GABAPENTIN 100 MG/1
100 CAPSULE ORAL
Qty: 450 CAPSULE | Refills: 0 | Status: SHIPPED
Start: 2019-08-15 | End: 2019-08-21 | Stop reason: SDUPTHER

## 2019-08-15 NOTE — TELEPHONE ENCOUNTER
Yasmeen is wanting a refill on gabapentin. I can not send this in this has to be sent by a doctor. If you are ok with sending this in you can sign

## 2019-08-16 ENCOUNTER — TELEPHONE (OUTPATIENT)
Dept: SURGERY | Facility: CLINIC | Age: 79
End: 2019-08-16

## 2019-08-16 NOTE — TELEPHONE ENCOUNTER
Pt has not been scheduled for surgery yet. Once she has clearance then we will set patient up with a surgery date. When I know the time of surgery, I will send in pre op antibiotics.

## 2019-08-20 ENCOUNTER — APPOINTMENT (OUTPATIENT)
Dept: LAB | Facility: HOSPITAL | Age: 79
End: 2019-08-20

## 2019-08-20 ENCOUNTER — CONSULT (OUTPATIENT)
Dept: ONCOLOGY | Facility: CLINIC | Age: 79
End: 2019-08-20

## 2019-08-20 VITALS — HEIGHT: 63 IN | BODY MASS INDEX: 49.04 KG/M2 | TEMPERATURE: 97.8 F | RESPIRATION RATE: 20 BRPM | WEIGHT: 276.8 LBS

## 2019-08-20 DIAGNOSIS — C18.9 ADENOCARCINOMA, COLON (HCC): Primary | ICD-10-CM

## 2019-08-20 DIAGNOSIS — R16.1 SPLENIC MASS: ICD-10-CM

## 2019-08-20 DIAGNOSIS — N18.4 STAGE 4 CHRONIC KIDNEY DISEASE (HCC): ICD-10-CM

## 2019-08-20 DIAGNOSIS — I48.20 CHRONIC ATRIAL FIBRILLATION (HCC): ICD-10-CM

## 2019-08-20 DIAGNOSIS — D50.0 IRON DEFICIENCY ANEMIA DUE TO CHRONIC BLOOD LOSS: ICD-10-CM

## 2019-08-20 DIAGNOSIS — R16.0 LIVER MASS: ICD-10-CM

## 2019-08-20 PROBLEM — D64.9 ANEMIA: Status: ACTIVE | Noted: 2019-08-20

## 2019-08-20 PROBLEM — N18.9 CKD (CHRONIC KIDNEY DISEASE): Status: ACTIVE | Noted: 2019-01-01

## 2019-08-20 PROCEDURE — G0463 HOSPITAL OUTPT CLINIC VISIT: HCPCS | Performed by: INTERNAL MEDICINE

## 2019-08-20 PROCEDURE — 99204 OFFICE O/P NEW MOD 45 MIN: CPT | Performed by: INTERNAL MEDICINE

## 2019-08-20 RX ORDER — ASCORBIC ACID 500 MG
500 TABLET ORAL DAILY
COMMUNITY

## 2019-08-20 RX ORDER — LEVOTHYROXINE SODIUM 0.1 MG/1
100 TABLET ORAL DAILY
COMMUNITY

## 2019-08-20 NOTE — PROGRESS NOTES
Hematology/Oncology Outpatient Consultation    Patient name: Yasmeen Rahman  : 1940  MRN: [id number]  Primary Care Physician: Alex Rodriguez MD  Referring Physician: Aayush Arnold*  Reason For Consult: Colon Cancer    Chief Complaint   Patient presents with   • Appointment     for colon cancer      and two daughters accompanied patient today.  MELISSA Hickey present during office visit.      History of Present Illness:  This patient is a 79-year-old  female who claims to have been anemic on and off for a few years for which she has been prescribed iron in the past.  She had a normal CBC on 2018 but then some Dr. Figueredo for a bladder lesion and was found to be anemic in the 2019.  She was experiencing increasing shortness of air on exertion but thought this was related to her underlying lung disease.  She also had chronic diarrhea with no blood noticed in the stool.  She was started on iron supplementation and vitamin B12 supplementation.  Her stool turned black but she was not having any nausea, vomiting, diarrhea or constipation.  She was not having any abdominal pain and has not lost any weight.  She was referred to Aayush Maurer MD and underwent work-up on 2019 including a comprehensive metabolic panel that was normal.  CBC revealed WBC 6.4, hemoglobin 10.7, MCV 84.8, RDW 16.5, platelet count 232,000, CEA is 0.7 (less than 2.5).  EGD and colonoscopy was performed the day revealing 5 to 10 mm polyps in the stomach body and fundus.  Granularity, friability, erythema and congestion was present in the cardia.  3.5 cm mass in the mid ascending colon.  Lipoma was seen in the mid ascending colon.  4 to 6 mm polyps in the distal ascending colon and sigmoid colon were seen.  Moderate diverticulosis of the sigmoid colon, grade/stage I internal hemorrhoids and 8 mm polyp in the proximal ascending colon were seen.  Pathology on the stomach polypectomy  revealed fundic gland polyp negative for dysplasia, cardia biopsy revealed body/fundic type mucosa with mild chronic gastritis, distal ascending colon polypectomy specimen had tubular adenoma, sigmoid colon polypectomy revealed hyperplastic polyp and mass in the mid ascending colon contained invasive moderately differentiated adenocarcinoma.  The patient was then referred to Dominick Cunha DO and myself and has been seen by Dr. Cunha with plans of open right colectomy after cardiac clearance.    Past Medical History:   Diagnosis Date   • Anemia 2018   • Anxiety 2018   • Arthritis    • Atrial fibrillation (CMS/HCC)    • CAD (coronary artery disease) 2013   • CKD (chronic kidney disease) 2019   • GERD (gastroesophageal reflux disease) 2009   • Gout 2009   • High cholesterol 1989   • Hypertension 1989   • Hypothyroidism 2004   • Lupus    • PH[place]EN[place] (obstructive sleep apnea)    • Osteopenia 2015   • Osteoporosis 2004   • Rheumatoid arthritis (CMS/HCC) 2009       Past Surgical History:   Procedure Laterality Date   • CHOLECYSTECTOMY  1990    laparoscopic    • HEEL SPUR EXCISION Right 1994   • HYSTERECTOMY  1994    total due to heavy beeding and fibroids   • TONSILLECTOMY  1962   • TOTAL HIP ARTHROPLASTY Right 2010         Current Outpatient Medications:   •  Acetaminophen (TYLENOL 8 HOUR ARTHRITIS PAIN PO), Take 650 mg by mouth 2 (Two) Times a Day., Disp: , Rfl:   •  amLODIPine (NORVASC) 10 MG tablet, , Disp: , Rfl:   •  aspirin 81 MG tablet, Take 81 mg by mouth Daily., Disp: , Rfl:   •  b complex vitamins tablet, B COMPLEX TABS, Disp: , Rfl:   •  bumetanide (BUMEX) 2 MG tablet, , Disp: , Rfl:   •  Calcium Carbonate-Vitamin D (CALCIUM 600+D) 600-200 MG-UNIT tablet, Take 1,200 mg by mouth Daily., Disp: , Rfl:   •  Cholecalciferol (VITAMIN D3) 2000 units capsule, VITAMIN D3 2000 UNIT CAPS, Disp: , Rfl:   •  CloNIDine (CATAPRES) 0.1 MG tablet, TAKE 1 TABLET TWICE A DAY, Disp: , Rfl:   •  Cranberry 125 MG tablet, Take  by  mouth., Disp: , Rfl:   •  cyanocobalamin (VITAMIN B-12) 2500 MCG tablet tablet, B-12 2500 MCG TABS, Disp: , Rfl:   •  denosumab (PROLIA) 60 MG/ML solution prefilled syringe syringe, PROLIA 60 MG/ML SOSY, Disp: , Rfl:   •  escitalopram (LEXAPRO) 10 MG tablet, , Disp: , Rfl:   •  ferrous sulfate 325 (65 FE) MG tablet, TAKE 1 TABLET BY MOUTH 2 TIMES DAILY WITH MEALS, Disp: , Rfl:   •  gabapentin (NEURONTIN) 100 MG capsule, Take 1 capsule by mouth 5 (Five) Times a Day., Disp: 450 capsule, Rfl: 0  •  hydroxychloroquine (PLAQUENIL) 200 MG tablet, , Disp: , Rfl:   •  irbesartan (AVAPRO) 150 MG tablet, Take 150 mg by mouth Every Night., Disp: , Rfl:   •  levothyroxine (SYNTHROID, LEVOTHROID) 100 MCG tablet, Take 100 mcg by mouth Daily., Disp: , Rfl:   •  metoprolol tartrate (LOPRESSOR) 100 MG tablet, TAKE 1 TABLET TWICE A DAY, Disp: , Rfl:   •  omeprazole (priLOSEC) 20 MG capsule, TAKE 1 CAPSULE DAILY, Disp: , Rfl:   •  potassium chloride (K-DUR) 10 MEQ CR tablet, , Disp: , Rfl:   •  pravastatin (PRAVACHOL) 40 MG tablet, Take 40 mg by mouth Daily., Disp: , Rfl:   •  ULORIC 40 MG tablet, , Disp: , Rfl:   •  valsartan (DIOVAN) 160 MG tablet, , Disp: , Rfl:   •  vitamin C (ASCORBIC ACID) 500 MG tablet, Take 500 mg by mouth Daily., Disp: , Rfl:   •  XARELTO 20 MG tablet, , Disp: , Rfl:   •  acetaminophen (TYLENOL) 500 MG tablet, Take 2 tablets by mouth Daily., Disp: , Rfl:   •  CALCIUM PO, CALCIUM CAPS, Disp: , Rfl:   •  cetirizine (zyrTEC) 10 MG tablet, Take 10 mg by mouth Daily., Disp: , Rfl:   •  gabapentin (NEURONTIN) 100 MG capsule, , Disp: , Rfl:   •  Hyaluronan (ORTHOVISC) 30 MG/2ML solution prefilled syringe injection, Inject 2 mL into the appropriate joint as directed by provider., Disp: , Rfl:   •  nitrofurantoin, macrocrystal-monohydrate, (MACROBID) 100 MG capsule, TK 1 C PO BID, Disp: , Rfl: 0  •  omeprazole (priLOSEC) 40 MG capsule, , Disp: , Rfl:   •  SYNTHROID 100 MCG tablet, , Disp: , Rfl:     Allergies    Allergen Reactions   • Codeine Nausea And Vomiting   • Hydrocodone-Acetaminophen Nausea And Vomiting     Nausea,vomiting         There is no immunization history on file for this patient.    Family History   Problem Relation Age of Onset   • Colon cancer Brother 71       Cancer-related family history includes Colon cancer (age of onset: 71) in her brother.    Social History     Tobacco Use   • Smoking status: Never Smoker   Substance Use Topics   • Alcohol use: No     Frequency: Never   • Drug use: No       ROS:    Review of Systems   Constitutional: Negative for chills and fever.   HENT: Positive for hearing loss ( slight) and sinus pressure ( occasionally). Negative for ear pain, mouth sores, nosebleeds and sore throat.    Eyes: Negative for photophobia and visual disturbance.        Wears glasses.    Respiratory: Positive for shortness of breath ( on exertion ). Negative for wheezing and stridor.    Cardiovascular: Positive for palpitations. Negative for chest pain.   Gastrointestinal: Positive for constipation ( secondary to iron pills), diarrhea and nausea ( occasionally). Negative for abdominal pain and vomiting.   Endocrine: Negative for cold intolerance and heat intolerance.   Genitourinary: Positive for urgency. Negative for dysuria and hematuria.        Urinary incontinence. She was 10 when she started her menstrual cycles and was 54 when she stopped. Her cycles were regular with heavy flow. Her last mammogram and PAP was in 2011. She was 25 when she had her first child and did not breast feed. Denies ever using any birth control pills or estrogen therapy.     Musculoskeletal: Negative for joint swelling and neck stiffness.        Has pain in her shoulders, hips, knees, legs, and back.    Skin: Negative for color change and rash.   Neurological: Positive for weakness ( in left leg) and numbness ( legs and occasionally arms). Negative for seizures and syncope.   Hematological: Negative for adenopathy.  "Bruises/bleeds easily.        No obvious bleeding   Psychiatric/Behavioral: Negative for agitation, confusion and hallucinations.       Objective:    Vitals:    08/20/19 1502   Resp: 20   Temp: 97.8 °F (36.6 °C)   Weight: 126 kg (276 lb 12.8 oz)   Height: 160 cm (63\")   PainSc: 0-No pain       ECOG  (1) Restricted in physically strenuous activity, ambulatory and able to do work of light nature    Physical Exam:    Physical Exam   Constitutional: She is oriented to person, place, and time. No distress.   HENT:   Head: Normocephalic and atraumatic.   Dental fillings.    Eyes: Conjunctivae and EOM are normal. Right eye exhibits no discharge. Left eye exhibits no discharge. No scleral icterus.   Eyeglasses present.    Neck: Normal range of motion. Neck supple. No thyromegaly present.   Cardiovascular: Normal rate, regular rhythm and normal heart sounds. Exam reveals no gallop and no friction rub.   Pulmonary/Chest: Effort normal. No stridor. No respiratory distress. She has no wheezes.   Abdominal: Soft. Bowel sounds are normal. She exhibits no mass. There is no tenderness. There is no rebound and no guarding.   Obese.    Musculoskeletal: Normal range of motion. She exhibits no tenderness.   Arthritic changes to the joints.    Lymphadenopathy:     She has no cervical adenopathy.   Neurological: She is alert and oriented to person, place, and time. She exhibits normal muscle tone.   Skin: Skin is warm. No rash noted. She is not diaphoretic. No erythema.   Psychiatric: She has a normal mood and affect. Her behavior is normal.   Nursing note and vitals reviewed.      RECENT LABS  WBC   Date Value Ref Range Status   05/17/2019 5.9 4.5 - 11.5 10*3/uL Final     RBC   Date Value Ref Range Status   05/17/2019 3.81 (L) 4.00 - 5.40 10*6/uL Final     Hemoglobin   Date Value Ref Range Status   05/17/2019 9.6 (L) 12.0 - 15.0 g/dL Final     Hematocrit   Date Value Ref Range Status   05/17/2019 30.2 (L) 35 - 49 % Final     MCV   Date " Value Ref Range Status   05/17/2019 79.1 (L) 80 - 94 fL Final     MCH   Date Value Ref Range Status   05/17/2019 25.1 (L) 26 - 32 pg Final     MCHC   Date Value Ref Range Status   05/17/2019 31.7 (L) 32 - 36 g/dL Final     RDW   Date Value Ref Range Status   05/17/2019 15.6 (H) 11.5 - 14.5 % Final     MPV   Date Value Ref Range Status   05/17/2019 7.0 (L) 7.4 - 10.4 fL Final     Platelets   Date Value Ref Range Status   05/17/2019 269 150 - 450 10*3/uL Final     Neutrophil Rel %   Date Value Ref Range Status   05/17/2019 76 (H) 50 - 75 % Final     Lymphocyte Rel %   Date Value Ref Range Status   05/17/2019 12 (L) 18 - 42 % Final     Monocyte Rel %   Date Value Ref Range Status   05/17/2019 10 2 - 11 % Final     Eosinophil Rel %   Date Value Ref Range Status   05/17/2019 2 0 - 3 % Final     Basophil Rel %   Date Value Ref Range Status   05/17/2019 0 0 - 2 % Final     Neutrophils Absolute   Date Value Ref Range Status   05/17/2019 4.5 2.3 - 8.6 10*3/uL Final     Lymphocytes Absolute   Date Value Ref Range Status   05/17/2019 0.7 (L) 0.8 - 4.8 10*3/uL Final     Monocytes Absolute   Date Value Ref Range Status   05/17/2019 0.6 0.1 - 1.3 10*3/uL Final     Eosinophils Absolute   Date Value Ref Range Status   05/17/2019 0.1 0.0 - 0.3 10*3/uL Final     Basophils Absolute   Date Value Ref Range Status   05/17/2019 0.0 0 - 0.2 10*3/uL Final     nRBC   Date Value Ref Range Status   05/17/2019 0 0 /100[WBCs] Final       Lab Results   Component Value Date    GLUCOSE 100 (H) 05/17/2019    BUN 15 05/17/2019    CREATININE 0.9 05/17/2019    EGFRIFNONA 82 03/06/2019    EGFRIFAFRI 71 03/06/2019    BCR 16.7 05/17/2019    K 4.1 05/17/2019    CO2 23 05/17/2019    CALCIUM 8.4 (L) 05/17/2019    ALBUMIN 3.9 05/17/2019    LABIL2 1.4 05/17/2019    AST 20 05/17/2019    ALT 16 05/17/2019         Assessment/Plan     Invasive moderately differentiated adenocarcinoma ascending colon     Iron deficiency anemia due to chronic blood loss    Liver  mass    Splenic mass    Chronic atrial fibrillation (CMS/HCC)    Stage 4 chronic kidney disease (CMS/HCC)    In summary this is a patient with newly diagnosed adenocarcinoma of the ascending colon who has multiple other medical problems.  Her cancer seems to be localized and hence her primary treatment should be surgical resection.  I made aware that if there is no lymph node involvement, she will likely not need any adjuvant chemotherapy.  She is on iron replacement for her anemia, and the liver and splenic masses appear benign.  She has an appointment with her cardiologist for surgical clearance and I will see her post surgery to finalize the long-term treatment plans.  She is scheduled for a preop chest x-ray by Dr. Cunha, and I will obtain a note from Dr. Figueredo.    I have reviewed labs results, imaging, vitals, and medications with the patient today.  Will follow up in 1 month.    Patient verbalized understanding and is in agreement of the above plan.    I have reviewed and agree with the above information.   Shola Rangel M.D., F.A.C.P.             Much of the above report is an electronic transcription/translation of the spoken language to printed text using Dragon Software. As such, the subtleties and finesse of the spoken language may permit erroneous, or at times, nonsensical words or phrases to be inadvertently transcribed; thus changes may be made at a later date to rectify these errors.

## 2019-08-21 ENCOUNTER — OFFICE VISIT (OUTPATIENT)
Dept: CARDIOLOGY | Facility: CLINIC | Age: 79
End: 2019-08-21

## 2019-08-21 VITALS
BODY MASS INDEX: 48.64 KG/M2 | OXYGEN SATURATION: 96 % | SYSTOLIC BLOOD PRESSURE: 114 MMHG | HEART RATE: 61 BPM | WEIGHT: 274.5 LBS | HEIGHT: 63 IN | DIASTOLIC BLOOD PRESSURE: 68 MMHG

## 2019-08-21 DIAGNOSIS — E78.00 PURE HYPERCHOLESTEROLEMIA: ICD-10-CM

## 2019-08-21 DIAGNOSIS — I48.0 PAROXYSMAL ATRIAL FIBRILLATION (HCC): Primary | ICD-10-CM

## 2019-08-21 DIAGNOSIS — Z01.810 PREOP CARDIOVASCULAR EXAM: ICD-10-CM

## 2019-08-21 DIAGNOSIS — I73.9 PERIPHERAL ARTERIAL DISEASE (HCC): ICD-10-CM

## 2019-08-21 DIAGNOSIS — I10 ESSENTIAL HYPERTENSION: ICD-10-CM

## 2019-08-21 PROCEDURE — 93000 ELECTROCARDIOGRAM COMPLETE: CPT | Performed by: INTERNAL MEDICINE

## 2019-08-21 PROCEDURE — 99213 OFFICE O/P EST LOW 20 MIN: CPT | Performed by: INTERNAL MEDICINE

## 2019-08-21 RX ORDER — GABAPENTIN 100 MG/1
100 CAPSULE ORAL
Qty: 450 CAPSULE | Refills: 0 | Status: ON HOLD
Start: 2019-08-21 | End: 2019-09-09

## 2019-08-21 NOTE — PROGRESS NOTES
"    Subjective:     Encounter Date:08/21/2019      Patient ID: Yasmeen Rahman is a 79 y.o. female.    Chief Complaint:  History of Present Illness 79-year-old white female with history of paroxysmal defibrillation peripheral vascular disease hypertension hyperlipidemia presents to my office for follow-up.  Patient is currently stable without any signs of chest pain but has some shortness of breath with exertion.  No complaint of any PND orthopnea.  No palpitations dizziness syncope or swelling of the feet.  She was recently diagnosed with colon cancer needs clearance for surgery.  Patient will have surgical general anesthesia.  She is taking her medicines regularly.  She does not smoke.    The following portions of the patient's history were reviewed and updated as appropriate: allergies, current medications, past family history, past medical history, past social history, past surgical history and problem list.  Past Medical History:   Diagnosis Date   • Anemia 2018   • Anxiety 2018   • Arthritis    • Atrial fibrillation (CMS/HCC)    • CAD (coronary artery disease) 2013   • CKD (chronic kidney disease) 2019   • Colon cancer (CMS/HCC)    • GERD (gastroesophageal reflux disease) 2009   • Gout 2009   • High cholesterol 1989   • Hypertension 1989   • Hypothyroidism 2004   • Lupus    • PH[place]ENIX (obstructive sleep apnea)    • Osteopenia 2015   • Osteoporosis 2004   • Rheumatoid arthritis (CMS/HCC) 2009     Past Surgical History:   Procedure Laterality Date   • CHOLECYSTECTOMY  1990    laparoscopic    • HEEL SPUR EXCISION Right 1994   • HYSTERECTOMY  1994    total due to heavy beeding and fibroids   • TONSILLECTOMY  1962   • TOTAL HIP ARTHROPLASTY Right 2010     /68 (BP Location: Left arm, Patient Position: Sitting)   Pulse 61   Ht 160 cm (63\")   Wt 125 kg (274 lb 8 oz)   SpO2 96%   BMI 48.63 kg/m²   Family History   Problem Relation Age of Onset   • Colon cancer Brother 71       Current Outpatient " Medications:   •  Acetaminophen (TYLENOL 8 HOUR ARTHRITIS PAIN PO), Take 650 mg by mouth 2 (Two) Times a Day., Disp: , Rfl:   •  amLODIPine (NORVASC) 10 MG tablet, , Disp: , Rfl:   •  aspirin 81 MG tablet, Take 81 mg by mouth Daily., Disp: , Rfl:   •  b complex vitamins tablet, B COMPLEX TABS, Disp: , Rfl:   •  bumetanide (BUMEX) 2 MG tablet, , Disp: , Rfl:   •  Calcium Carbonate-Vitamin D (CALCIUM 600+D) 600-200 MG-UNIT tablet, Take 1,200 mg by mouth Daily., Disp: , Rfl:   •  Cholecalciferol (VITAMIN D3) 2000 units capsule, VITAMIN D3 2000 UNIT CAPS, Disp: , Rfl:   •  CloNIDine (CATAPRES) 0.1 MG tablet, TAKE 1 TABLET TWICE A DAY, Disp: , Rfl:   •  Cranberry 125 MG tablet, Take  by mouth., Disp: , Rfl:   •  cyanocobalamin (VITAMIN B-12) 2500 MCG tablet tablet, B-12 2500 MCG TABS, Disp: , Rfl:   •  denosumab (PROLIA) 60 MG/ML solution prefilled syringe syringe, PROLIA 60 MG/ML SOSY, Disp: , Rfl:   •  escitalopram (LEXAPRO) 10 MG tablet, , Disp: , Rfl:   •  ferrous sulfate 325 (65 FE) MG tablet, TAKE 1 TABLET BY MOUTh DAILY WITH MEALS, Disp: , Rfl:   •  gabapentin (NEURONTIN) 100 MG capsule, , Disp: , Rfl:   •  gabapentin (NEURONTIN) 100 MG capsule, Take 1 capsule by mouth 5 (Five) Times a Day., Disp: 450 capsule, Rfl: 0  •  Hyaluronan (ORTHOVISC) 30 MG/2ML solution prefilled syringe injection, Inject 2 mL into the appropriate joint as directed by provider., Disp: , Rfl:   •  hydroxychloroquine (PLAQUENIL) 200 MG tablet, , Disp: , Rfl:   •  irbesartan (AVAPRO) 150 MG tablet, Take 150 mg by mouth Every Night., Disp: , Rfl:   •  levothyroxine (SYNTHROID, LEVOTHROID) 100 MCG tablet, Take 100 mcg by mouth Daily., Disp: , Rfl:   •  metoprolol tartrate (LOPRESSOR) 100 MG tablet, TAKE 1 TABLET TWICE A DAY, Disp: , Rfl:   •  omeprazole (priLOSEC) 20 MG capsule, TAKE 1 CAPSULE DAILY, Disp: , Rfl:   •  potassium chloride (K-DUR) 10 MEQ CR tablet, , Disp: , Rfl:   •  pravastatin (PRAVACHOL) 40 MG tablet, Take 40 mg by mouth  Daily., Disp: , Rfl:   •  SYNTHROID 100 MCG tablet, , Disp: , Rfl:   •  ULORIC 40 MG tablet, , Disp: , Rfl:   •  valsartan (DIOVAN) 160 MG tablet, , Disp: , Rfl:   •  vitamin C (ASCORBIC ACID) 500 MG tablet, Take 500 mg by mouth Daily., Disp: , Rfl:   •  XARELTO 20 MG tablet, , Disp: , Rfl:   •  acetaminophen (TYLENOL) 500 MG tablet, Take 2 tablets by mouth Daily., Disp: , Rfl:   •  CALCIUM PO, CALCIUM CAPS, Disp: , Rfl:   •  cetirizine (zyrTEC) 10 MG tablet, Take 10 mg by mouth Daily., Disp: , Rfl:   •  nitrofurantoin, macrocrystal-monohydrate, (MACROBID) 100 MG capsule, TK 1 C PO BID, Disp: , Rfl: 0  •  omeprazole (priLOSEC) 40 MG capsule, , Disp: , Rfl:   Allergies   Allergen Reactions   • Codeine Nausea And Vomiting   • Hydrocodone-Acetaminophen Nausea And Vomiting     Nausea,vomiting     Social History     Socioeconomic History   • Marital status:      Spouse name: Not on file   • Number of children: 4   • Years of education: Not on file   • Highest education level: Not on file   Occupational History   • Occupation: Retired    Tobacco Use   • Smoking status: Never Smoker   Substance and Sexual Activity   • Alcohol use: No     Frequency: Never   • Drug use: No     Review of Systems   Constitution: Positive for chills and malaise/fatigue. Negative for fever.   Cardiovascular: Negative for chest pain, dyspnea on exertion, leg swelling and palpitations.   Respiratory: Positive for shortness of breath. Negative for cough.    Skin: Negative for rash.   Gastrointestinal: Negative for abdominal pain, nausea and vomiting.   Neurological: Positive for numbness. Negative for dizziness, focal weakness, headaches and light-headedness.   All other systems reviewed and are negative.             Objective:     Physical Exam   Constitutional: She appears well-developed and well-nourished.   HENT:   Head: Normocephalic and atraumatic.   Eyes: Conjunctivae are normal. No scleral icterus.   Neck: Normal range of  motion. Neck supple. No JVD present. Carotid bruit is not present.   Cardiovascular: Normal rate, regular rhythm, S1 normal, S2 normal, normal heart sounds and intact distal pulses. PMI is not displaced.   Pulmonary/Chest: Effort normal and breath sounds normal. She has no wheezes. She has no rales.   Abdominal: Soft. Bowel sounds are normal.   Neurological: She is alert. She has normal strength.   Skin: Skin is warm and dry. No rash noted.       ECG 12 Lead  Date/Time: 8/21/2019 11:03 AM  Performed by: Trever Mtz MD  Authorized by: Trever Mtz MD   Comments: Atrial fibrillation with slow ventricular response.  No previous EKGs to compare with            Lab Review:       Assessment:          Diagnosis Plan   1. Paroxysmal atrial fibrillation (CMS/HCC)     2. Essential hypertension     3. Pure hypercholesterolemia     4. Peripheral arterial disease (CMS/HCC)     5. Preop cardiovascular exam            Plan:       Patient has colon cancer and needs preop evaluation under general anesthesia.  Patient is doing well and currently stable from cardiac status.  I will clear her for surgery and anesthesia with low risk.  Patient should continue her home medicines including beta-blockers and statins.  Patient is on Xarelto which she can hold for 2 days prior to her surgery.  Patient's blood pressure and heart are stable.  Patient lipid levels are followed by the primary care doctor.

## 2019-08-22 PROBLEM — C18.2 MALIGNANT NEOPLASM OF ASCENDING COLON (HCC): Status: ACTIVE | Noted: 2019-08-22

## 2019-08-22 RX ORDER — NEOMYCIN SULFATE 500 MG/1
TABLET ORAL
Qty: 6 TABLET | Refills: 0 | Status: SHIPPED | OUTPATIENT
Start: 2019-08-22 | End: 2019-08-27

## 2019-08-22 RX ORDER — ERYTHROMYCIN 500 MG/1
TABLET, COATED ORAL
Qty: 6 TABLET | Refills: 0 | Status: SHIPPED | OUTPATIENT
Start: 2019-08-22 | End: 2019-08-27

## 2019-08-27 ENCOUNTER — APPOINTMENT (OUTPATIENT)
Dept: PREADMISSION TESTING | Facility: HOSPITAL | Age: 79
End: 2019-08-27

## 2019-08-27 ENCOUNTER — HOSPITAL ENCOUNTER (OUTPATIENT)
Dept: GENERAL RADIOLOGY | Facility: HOSPITAL | Age: 79
Discharge: HOME | End: 2019-08-27
Admitting: SURGERY

## 2019-08-27 VITALS
BODY MASS INDEX: 49.33 KG/M2 | SYSTOLIC BLOOD PRESSURE: 134 MMHG | RESPIRATION RATE: 14 BRPM | WEIGHT: 278.4 LBS | DIASTOLIC BLOOD PRESSURE: 84 MMHG | HEIGHT: 63 IN | OXYGEN SATURATION: 96 % | HEART RATE: 68 BPM

## 2019-08-27 DIAGNOSIS — C18.2 MALIGNANT NEOPLASM OF ASCENDING COLON (HCC): ICD-10-CM

## 2019-08-27 LAB
ABO GROUP BLD: NORMAL
ALBUMIN SERPL-MCNC: 3.9 G/DL (ref 3.5–4.8)
ALBUMIN/GLOB SERPL: 1.6 G/DL (ref 1–1.7)
ALP SERPL-CCNC: 73 U/L (ref 32–91)
ALT SERPL W P-5'-P-CCNC: 21 U/L (ref 14–54)
ANION GAP SERPL CALCULATED.3IONS-SCNC: 17.9 MMOL/L (ref 5–15)
AST SERPL-CCNC: 28 U/L (ref 15–41)
BASOPHILS # BLD AUTO: 0 10*3/MM3 (ref 0–0.2)
BASOPHILS NFR BLD AUTO: 0.3 % (ref 0–1.5)
BILIRUB SERPL-MCNC: 0.6 MG/DL (ref 0.3–1.2)
BLD GP AB SCN SERPL QL: NEGATIVE
BUN BLD-MCNC: 18 MG/DL (ref 8–20)
BUN/CREAT SERPL: 16.4 (ref 5.4–26.2)
CALCIUM SPEC-SCNC: 8.7 MG/DL (ref 8.9–10.3)
CEA SERPL-MCNC: 1.11 NG/ML (ref 0–5)
CHLORIDE SERPL-SCNC: 102 MMOL/L (ref 101–111)
CO2 SERPL-SCNC: 25 MMOL/L (ref 22–32)
CREAT BLD-MCNC: 1.1 MG/DL (ref 0.4–1)
DEPRECATED RDW RBC AUTO: 53.8 FL (ref 37–54)
EOSINOPHIL # BLD AUTO: 0.2 10*3/MM3 (ref 0–0.4)
EOSINOPHIL NFR BLD AUTO: 2.5 % (ref 0.3–6.2)
ERYTHROCYTE [DISTWIDTH] IN BLOOD BY AUTOMATED COUNT: 17.4 % (ref 12.3–15.4)
GFR SERPL CREATININE-BSD FRML MDRD: 48 ML/MIN/1.73
GLOBULIN UR ELPH-MCNC: 2.4 GM/DL (ref 2.5–3.8)
GLUCOSE BLD-MCNC: 92 MG/DL (ref 65–99)
HCT VFR BLD AUTO: 35.9 % (ref 34–46.6)
HGB BLD-MCNC: 11.8 G/DL (ref 12–15.9)
LYMPHOCYTES # BLD AUTO: 1.2 10*3/MM3 (ref 0.7–3.1)
LYMPHOCYTES NFR BLD AUTO: 15.6 % (ref 19.6–45.3)
MCH RBC QN AUTO: 28.4 PG (ref 26.6–33)
MCHC RBC AUTO-ENTMCNC: 32.9 G/DL (ref 31.5–35.7)
MCV RBC AUTO: 86.3 FL (ref 79–97)
MONOCYTES # BLD AUTO: 0.6 10*3/MM3 (ref 0.1–0.9)
MONOCYTES NFR BLD AUTO: 8.5 % (ref 5–12)
NEUTROPHILS # BLD AUTO: 5.4 10*3/MM3 (ref 1.7–7)
NEUTROPHILS NFR BLD AUTO: 73.1 % (ref 42.7–76)
NRBC BLD AUTO-RTO: 0.1 /100 WBC (ref 0–0.2)
PLATELET # BLD AUTO: 241 10*3/MM3 (ref 140–450)
PMV BLD AUTO: 7.6 FL (ref 6–12)
POTASSIUM BLD-SCNC: 3.9 MMOL/L (ref 3.6–5.1)
PROT SERPL-MCNC: 6.3 G/DL (ref 6.1–7.9)
RBC # BLD AUTO: 4.16 10*6/MM3 (ref 3.77–5.28)
RH BLD: POSITIVE
SODIUM BLD-SCNC: 141 MMOL/L (ref 136–144)
T&S EXPIRATION DATE: NORMAL
WBC NRBC COR # BLD: 7.4 10*3/MM3 (ref 3.4–10.8)

## 2019-08-27 PROCEDURE — 86850 RBC ANTIBODY SCREEN: CPT | Performed by: SURGERY

## 2019-08-27 PROCEDURE — 86901 BLOOD TYPING SEROLOGIC RH(D): CPT | Performed by: SURGERY

## 2019-08-27 PROCEDURE — 80053 COMPREHEN METABOLIC PANEL: CPT | Performed by: SURGERY

## 2019-08-27 PROCEDURE — 86900 BLOOD TYPING SEROLOGIC ABO: CPT | Performed by: SURGERY

## 2019-08-27 PROCEDURE — 87081 CULTURE SCREEN ONLY: CPT | Performed by: SURGERY

## 2019-08-27 PROCEDURE — 71046 X-RAY EXAM CHEST 2 VIEWS: CPT

## 2019-08-27 PROCEDURE — 36415 COLL VENOUS BLD VENIPUNCTURE: CPT

## 2019-08-27 PROCEDURE — 86901 BLOOD TYPING SEROLOGIC RH(D): CPT

## 2019-08-27 PROCEDURE — 85025 COMPLETE CBC W/AUTO DIFF WBC: CPT | Performed by: SURGERY

## 2019-08-27 PROCEDURE — 86900 BLOOD TYPING SEROLOGIC ABO: CPT

## 2019-08-27 PROCEDURE — 82378 CARCINOEMBRYONIC ANTIGEN: CPT | Performed by: SURGERY

## 2019-08-28 LAB — MRSA SPEC QL CULT: NORMAL

## 2019-08-28 RX ORDER — HYDROXYCHLOROQUINE SULFATE 200 MG/1
TABLET, FILM COATED ORAL
Qty: 180 TABLET | Refills: 4 | OUTPATIENT
Start: 2019-08-28

## 2019-08-28 RX ORDER — FEBUXOSTAT 40 MG/1
TABLET, FILM COATED ORAL
Qty: 90 TABLET | Refills: 4 | OUTPATIENT
Start: 2019-08-28

## 2019-08-28 RX ORDER — ONDANSETRON 4 MG/1
4 TABLET, FILM COATED ORAL AS NEEDED
Qty: 20 TABLET | Refills: 0 | Status: SHIPPED | OUTPATIENT
Start: 2019-08-28 | End: 2019-09-24 | Stop reason: SDUPTHER

## 2019-09-04 ENCOUNTER — ANESTHESIA EVENT (OUTPATIENT)
Dept: PERIOP | Facility: HOSPITAL | Age: 79
End: 2019-09-04

## 2019-09-05 ENCOUNTER — HOSPITAL ENCOUNTER (INPATIENT)
Facility: HOSPITAL | Age: 79
LOS: 6 days | Discharge: HOME HEALTH | End: 2019-09-11
Attending: SURGERY | Admitting: SURGERY

## 2019-09-05 ENCOUNTER — ANESTHESIA (OUTPATIENT)
Dept: PERIOP | Facility: HOSPITAL | Age: 79
End: 2019-09-05

## 2019-09-05 DIAGNOSIS — C18.2 CARCINOMA OF ASCENDING COLON (HCC): ICD-10-CM

## 2019-09-05 DIAGNOSIS — I48.91 ATRIAL FIBRILLATION, UNSPECIFIED TYPE (HCC): Primary | ICD-10-CM

## 2019-09-05 DIAGNOSIS — C18.2 MALIGNANT NEOPLASM OF ASCENDING COLON (HCC): ICD-10-CM

## 2019-09-05 PROCEDURE — 25010000002 CEFOXITIN PER 1 G: Performed by: ANESTHESIOLOGY

## 2019-09-05 PROCEDURE — 25010000002 KETOROLAC TROMETHAMINE PER 15 MG: Performed by: ANESTHESIOLOGY

## 2019-09-05 PROCEDURE — 94799 UNLISTED PULMONARY SVC/PX: CPT

## 2019-09-05 PROCEDURE — 25010000002 NEOSTIGMINE 10 MG/10ML SOLUTION: Performed by: ANESTHESIOLOGY

## 2019-09-05 PROCEDURE — 25010000002 PROPOFOL 10 MG/ML EMULSION: Performed by: ANESTHESIOLOGY

## 2019-09-05 PROCEDURE — 25010000002 DEXAMETHASONE PER 1 MG: Performed by: ANESTHESIOLOGY

## 2019-09-05 PROCEDURE — 44140 PARTIAL REMOVAL OF COLON: CPT | Performed by: NURSE PRACTITIONER

## 2019-09-05 PROCEDURE — 25010000002 ONDANSETRON PER 1 MG: Performed by: SURGERY

## 2019-09-05 PROCEDURE — 25010000002 ROPIVACAINE PER 1 MG: Performed by: ANESTHESIOLOGY

## 2019-09-05 PROCEDURE — 44140 PARTIAL REMOVAL OF COLON: CPT | Performed by: SURGERY

## 2019-09-05 PROCEDURE — 25010000002 CEFOXITIN PER 1 G: Performed by: SURGERY

## 2019-09-05 PROCEDURE — 25010000002 SUCCINYLCHOLINE PER 20 MG: Performed by: ANESTHESIOLOGY

## 2019-09-05 PROCEDURE — 25010000002 ONDANSETRON PER 1 MG: Performed by: ANESTHESIOLOGY

## 2019-09-05 PROCEDURE — 0DTF0ZZ RESECTION OF RIGHT LARGE INTESTINE, OPEN APPROACH: ICD-10-PCS | Performed by: SURGERY

## 2019-09-05 PROCEDURE — 88309 TISSUE EXAM BY PATHOLOGIST: CPT | Performed by: SURGERY

## 2019-09-05 PROCEDURE — 25010000002 MORPHINE PER 10 MG: Performed by: SURGERY

## 2019-09-05 PROCEDURE — 99221 1ST HOSP IP/OBS SF/LOW 40: CPT | Performed by: NURSE PRACTITIONER

## 2019-09-05 PROCEDURE — 25010000002 FENTANYL CITRATE (PF) 100 MCG/2ML SOLUTION: Performed by: ANESTHESIOLOGY

## 2019-09-05 PROCEDURE — 94762 N-INVAS EAR/PLS OXIMTRY CONT: CPT

## 2019-09-05 DEVICE — PROXIMATE RELOADABLE LINEAR STAPLER
Type: IMPLANTABLE DEVICE | Site: ABDOMEN | Status: FUNCTIONAL
Brand: PROXIMATE

## 2019-09-05 DEVICE — PROXIMATE LINEAR CUTTER RELOAD, BLUE, 75MM
Type: IMPLANTABLE DEVICE | Site: ABDOMEN | Status: FUNCTIONAL
Brand: PROXIMATE

## 2019-09-05 DEVICE — PROXIMATE RELOADABLE LINEAR CUTTER WITH SAFETY LOCK-OUT, 75MM
Type: IMPLANTABLE DEVICE | Site: ABDOMEN | Status: FUNCTIONAL
Brand: PROXIMATE

## 2019-09-05 RX ORDER — ASCORBIC ACID 500 MG
500 TABLET ORAL DAILY
Status: DISCONTINUED | OUTPATIENT
Start: 2019-09-06 | End: 2019-09-10

## 2019-09-05 RX ORDER — MORPHINE SULFATE 4 MG/ML
6 INJECTION, SOLUTION INTRAMUSCULAR; INTRAVENOUS
Status: DISCONTINUED | OUTPATIENT
Start: 2019-09-05 | End: 2019-09-11 | Stop reason: HOSPADM

## 2019-09-05 RX ORDER — CEFOXITIN 2 G/1
INJECTION, POWDER, FOR SOLUTION INTRAVENOUS AS NEEDED
Status: DISCONTINUED | OUTPATIENT
Start: 2019-09-05 | End: 2019-09-05 | Stop reason: SURG

## 2019-09-05 RX ORDER — FAMOTIDINE 10 MG/ML
20 INJECTION, SOLUTION INTRAVENOUS 2 TIMES DAILY
Status: DISCONTINUED | OUTPATIENT
Start: 2019-09-05 | End: 2019-09-10

## 2019-09-05 RX ORDER — ROPIVACAINE HYDROCHLORIDE 5 MG/ML
INJECTION, SOLUTION EPIDURAL; INFILTRATION; PERINEURAL
Status: DISCONTINUED | OUTPATIENT
Start: 2019-09-05 | End: 2019-09-05 | Stop reason: SURG

## 2019-09-05 RX ORDER — METOPROLOL TARTRATE 50 MG/1
100 TABLET, FILM COATED ORAL 2 TIMES DAILY
Status: DISCONTINUED | OUTPATIENT
Start: 2019-09-05 | End: 2019-09-11 | Stop reason: HOSPADM

## 2019-09-05 RX ORDER — SODIUM CHLORIDE 9 MG/ML
100 INJECTION, SOLUTION INTRAVENOUS CONTINUOUS
Status: DISCONTINUED | OUTPATIENT
Start: 2019-09-05 | End: 2019-09-07

## 2019-09-05 RX ORDER — SODIUM CHLORIDE 0.9 % (FLUSH) 0.9 %
3 SYRINGE (ML) INJECTION EVERY 12 HOURS SCHEDULED
Status: DISCONTINUED | OUTPATIENT
Start: 2019-09-05 | End: 2019-09-05 | Stop reason: HOSPADM

## 2019-09-05 RX ORDER — ACETAMINOPHEN 500 MG
1000 TABLET ORAL DAILY
Status: DISCONTINUED | OUTPATIENT
Start: 2019-09-06 | End: 2019-09-10

## 2019-09-05 RX ORDER — ONDANSETRON 4 MG/1
4 TABLET, FILM COATED ORAL EVERY 6 HOURS PRN
Status: DISCONTINUED | OUTPATIENT
Start: 2019-09-05 | End: 2019-09-11 | Stop reason: HOSPADM

## 2019-09-05 RX ORDER — KETOROLAC TROMETHAMINE 30 MG/ML
INJECTION, SOLUTION INTRAMUSCULAR; INTRAVENOUS AS NEEDED
Status: DISCONTINUED | OUTPATIENT
Start: 2019-09-05 | End: 2019-09-05 | Stop reason: SURG

## 2019-09-05 RX ORDER — HYDROMORPHONE HCL 110MG/55ML
0.5 PATIENT CONTROLLED ANALGESIA SYRINGE INTRAVENOUS
Status: DISCONTINUED | OUTPATIENT
Start: 2019-09-05 | End: 2019-09-05 | Stop reason: HOSPADM

## 2019-09-05 RX ORDER — FENTANYL CITRATE 50 UG/ML
INJECTION, SOLUTION INTRAMUSCULAR; INTRAVENOUS AS NEEDED
Status: DISCONTINUED | OUTPATIENT
Start: 2019-09-05 | End: 2019-09-05 | Stop reason: SURG

## 2019-09-05 RX ORDER — ACETAMINOPHEN 650 MG
TABLET, EXTENDED RELEASE ORAL AS NEEDED
Status: DISCONTINUED | OUTPATIENT
Start: 2019-09-05 | End: 2019-09-05 | Stop reason: HOSPADM

## 2019-09-05 RX ORDER — DEXAMETHASONE SODIUM PHOSPHATE 4 MG/ML
INJECTION, SOLUTION INTRA-ARTICULAR; INTRALESIONAL; INTRAMUSCULAR; INTRAVENOUS; SOFT TISSUE
Status: DISCONTINUED | OUTPATIENT
Start: 2019-09-05 | End: 2019-09-05 | Stop reason: SURG

## 2019-09-05 RX ORDER — ROCURONIUM BROMIDE 10 MG/ML
INJECTION, SOLUTION INTRAVENOUS AS NEEDED
Status: DISCONTINUED | OUTPATIENT
Start: 2019-09-05 | End: 2019-09-05 | Stop reason: SURG

## 2019-09-05 RX ORDER — ERYTHROMYCIN 250 MG/1
250 TABLET, COATED ORAL 4 TIMES DAILY
COMMUNITY
Start: 2019-09-04 | End: 2019-09-11 | Stop reason: HOSPADM

## 2019-09-05 RX ORDER — PROPOFOL 10 MG/ML
VIAL (ML) INTRAVENOUS AS NEEDED
Status: DISCONTINUED | OUTPATIENT
Start: 2019-09-05 | End: 2019-09-05 | Stop reason: SURG

## 2019-09-05 RX ORDER — LEVOTHYROXINE SODIUM 0.1 MG/1
100 TABLET ORAL DAILY
Status: DISCONTINUED | OUTPATIENT
Start: 2019-09-06 | End: 2019-09-11 | Stop reason: HOSPADM

## 2019-09-05 RX ORDER — SODIUM CHLORIDE 9 MG/ML
9 INJECTION, SOLUTION INTRAVENOUS CONTINUOUS PRN
Status: DISCONTINUED | OUTPATIENT
Start: 2019-09-05 | End: 2019-09-11 | Stop reason: HOSPADM

## 2019-09-05 RX ORDER — LEVOTHYROXINE SODIUM 0.1 MG/1
100 TABLET ORAL DAILY
Status: DISCONTINUED | OUTPATIENT
Start: 2019-09-05 | End: 2019-09-05

## 2019-09-05 RX ORDER — NALOXONE HCL 0.4 MG/ML
0.4 VIAL (ML) INJECTION
Status: DISCONTINUED | OUTPATIENT
Start: 2019-09-05 | End: 2019-09-11 | Stop reason: HOSPADM

## 2019-09-05 RX ORDER — KETAMINE HYDROCHLORIDE 10 MG/ML
INJECTION INTRAMUSCULAR; INTRAVENOUS AS NEEDED
Status: DISCONTINUED | OUTPATIENT
Start: 2019-09-05 | End: 2019-09-05 | Stop reason: SURG

## 2019-09-05 RX ORDER — MEPERIDINE HYDROCHLORIDE 50 MG/1
50 TABLET ORAL EVERY 4 HOURS PRN
Status: DISPENSED | OUTPATIENT
Start: 2019-09-05 | End: 2019-09-08

## 2019-09-05 RX ORDER — ESCITALOPRAM OXALATE 10 MG/1
10 TABLET ORAL DAILY
Status: DISCONTINUED | OUTPATIENT
Start: 2019-09-05 | End: 2019-09-11 | Stop reason: HOSPADM

## 2019-09-05 RX ORDER — AMLODIPINE BESYLATE 5 MG/1
10 TABLET ORAL DAILY
Status: DISCONTINUED | OUTPATIENT
Start: 2019-09-06 | End: 2019-09-11 | Stop reason: HOSPADM

## 2019-09-05 RX ORDER — NEOMYCIN SULFATE 500 MG/1
500 TABLET ORAL 4 TIMES DAILY
COMMUNITY
Start: 2019-09-04 | End: 2019-09-11 | Stop reason: HOSPADM

## 2019-09-05 RX ORDER — ONDANSETRON 2 MG/ML
INJECTION INTRAMUSCULAR; INTRAVENOUS AS NEEDED
Status: DISCONTINUED | OUTPATIENT
Start: 2019-09-05 | End: 2019-09-05 | Stop reason: SURG

## 2019-09-05 RX ORDER — PHENYLEPHRINE HCL IN 0.9% NACL 0.5 MG/5ML
SYRINGE (ML) INTRAVENOUS AS NEEDED
Status: DISCONTINUED | OUTPATIENT
Start: 2019-09-05 | End: 2019-09-05 | Stop reason: SURG

## 2019-09-05 RX ORDER — HYDROCODONE BITARTRATE AND ACETAMINOPHEN 7.5; 325 MG/1; MG/1
1 TABLET ORAL ONCE AS NEEDED
Status: DISCONTINUED | OUTPATIENT
Start: 2019-09-05 | End: 2019-09-05 | Stop reason: HOSPADM

## 2019-09-05 RX ORDER — ONDANSETRON 2 MG/ML
4 INJECTION INTRAMUSCULAR; INTRAVENOUS ONCE AS NEEDED
Status: DISCONTINUED | OUTPATIENT
Start: 2019-09-05 | End: 2019-09-05 | Stop reason: HOSPADM

## 2019-09-05 RX ORDER — BUMETANIDE 1 MG/1
2 TABLET ORAL DAILY
Status: DISCONTINUED | OUTPATIENT
Start: 2019-09-06 | End: 2019-09-11 | Stop reason: HOSPADM

## 2019-09-05 RX ORDER — LOSARTAN POTASSIUM 25 MG/1
25 TABLET ORAL
Status: DISCONTINUED | OUTPATIENT
Start: 2019-09-06 | End: 2019-09-11 | Stop reason: HOSPADM

## 2019-09-05 RX ORDER — SUCCINYLCHOLINE CHLORIDE 20 MG/ML
INJECTION INTRAMUSCULAR; INTRAVENOUS AS NEEDED
Status: DISCONTINUED | OUTPATIENT
Start: 2019-09-05 | End: 2019-09-05 | Stop reason: SURG

## 2019-09-05 RX ORDER — SODIUM CHLORIDE 0.9 % (FLUSH) 0.9 %
3-10 SYRINGE (ML) INJECTION AS NEEDED
Status: DISCONTINUED | OUTPATIENT
Start: 2019-09-05 | End: 2019-09-05 | Stop reason: HOSPADM

## 2019-09-05 RX ORDER — DEXAMETHASONE SODIUM PHOSPHATE 4 MG/ML
INJECTION, SOLUTION INTRA-ARTICULAR; INTRALESIONAL; INTRAMUSCULAR; INTRAVENOUS; SOFT TISSUE AS NEEDED
Status: DISCONTINUED | OUTPATIENT
Start: 2019-09-05 | End: 2019-09-05 | Stop reason: SURG

## 2019-09-05 RX ORDER — ONDANSETRON 4 MG/1
4 TABLET, FILM COATED ORAL AS NEEDED
Status: DISCONTINUED | OUTPATIENT
Start: 2019-09-05 | End: 2019-09-05

## 2019-09-05 RX ORDER — GLYCOPYRROLATE 0.2 MG/ML
INJECTION INTRAMUSCULAR; INTRAVENOUS AS NEEDED
Status: DISCONTINUED | OUTPATIENT
Start: 2019-09-05 | End: 2019-09-05 | Stop reason: SURG

## 2019-09-05 RX ORDER — MEPERIDINE HYDROCHLORIDE 25 MG/ML
12.5 INJECTION INTRAMUSCULAR; INTRAVENOUS; SUBCUTANEOUS
Status: DISCONTINUED | OUTPATIENT
Start: 2019-09-05 | End: 2019-09-05 | Stop reason: HOSPADM

## 2019-09-05 RX ORDER — MORPHINE SULFATE 4 MG/ML
4 INJECTION, SOLUTION INTRAMUSCULAR; INTRAVENOUS
Status: DISCONTINUED | OUTPATIENT
Start: 2019-09-05 | End: 2019-09-11 | Stop reason: HOSPADM

## 2019-09-05 RX ORDER — FEBUXOSTAT 40 MG/1
40 TABLET, FILM COATED ORAL DAILY
Status: DISCONTINUED | OUTPATIENT
Start: 2019-09-06 | End: 2019-09-10

## 2019-09-05 RX ORDER — SODIUM CHLORIDE, SODIUM LACTATE, POTASSIUM CHLORIDE, CALCIUM CHLORIDE 600; 310; 30; 20 MG/100ML; MG/100ML; MG/100ML; MG/100ML
INJECTION, SOLUTION INTRAVENOUS CONTINUOUS PRN
Status: DISCONTINUED | OUTPATIENT
Start: 2019-09-05 | End: 2019-09-05 | Stop reason: SURG

## 2019-09-05 RX ORDER — ONDANSETRON 2 MG/ML
4 INJECTION INTRAMUSCULAR; INTRAVENOUS EVERY 6 HOURS PRN
Status: DISCONTINUED | OUTPATIENT
Start: 2019-09-05 | End: 2019-09-11 | Stop reason: HOSPADM

## 2019-09-05 RX ORDER — CLONIDINE HYDROCHLORIDE 0.1 MG/1
0.1 TABLET ORAL 2 TIMES DAILY
Status: DISCONTINUED | OUTPATIENT
Start: 2019-09-05 | End: 2019-09-11 | Stop reason: HOSPADM

## 2019-09-05 RX ORDER — LIDOCAINE HYDROCHLORIDE 20 MG/ML
INJECTION, SOLUTION EPIDURAL; INFILTRATION; INTRACAUDAL; PERINEURAL AS NEEDED
Status: DISCONTINUED | OUTPATIENT
Start: 2019-09-05 | End: 2019-09-05 | Stop reason: SURG

## 2019-09-05 RX ORDER — NEOSTIGMINE METHYLSULFATE 1 MG/ML
INJECTION, SOLUTION INTRAVENOUS AS NEEDED
Status: DISCONTINUED | OUTPATIENT
Start: 2019-09-05 | End: 2019-09-05 | Stop reason: SURG

## 2019-09-05 RX ORDER — GABAPENTIN 100 MG/1
100 CAPSULE ORAL
Status: DISCONTINUED | OUTPATIENT
Start: 2019-09-05 | End: 2019-09-09

## 2019-09-05 RX ADMIN — GABAPENTIN 100 MG: 100 CAPSULE ORAL at 20:53

## 2019-09-05 RX ADMIN — METOPROLOL TARTRATE 100 MG: 50 TABLET, FILM COATED ORAL at 20:54

## 2019-09-05 RX ADMIN — SUCCINYLCHOLINE CHLORIDE 160 MG: 20 INJECTION, SOLUTION INTRAMUSCULAR; INTRAVENOUS at 12:35

## 2019-09-05 RX ADMIN — PHENYLEPHRINE HYDROCHLORIDE 200 MCG: 10 INJECTION INTRAVENOUS at 12:44

## 2019-09-05 RX ADMIN — FENTANYL CITRATE 75 MCG: 50 INJECTION, SOLUTION INTRAMUSCULAR; INTRAVENOUS at 13:45

## 2019-09-05 RX ADMIN — PHENYLEPHRINE HYDROCHLORIDE 200 MCG: 10 INJECTION INTRAVENOUS at 12:54

## 2019-09-05 RX ADMIN — ONDANSETRON 4 MG: 2 INJECTION INTRAMUSCULAR; INTRAVENOUS at 18:55

## 2019-09-05 RX ADMIN — CEFOXITIN 2 G: 2 INJECTION, POWDER, FOR SOLUTION INTRAVENOUS at 21:51

## 2019-09-05 RX ADMIN — SODIUM CHLORIDE: 900 INJECTION, SOLUTION INTRAVENOUS at 13:50

## 2019-09-05 RX ADMIN — ROPIVACAINE HYDROCHLORIDE 30 ML: 5 INJECTION, SOLUTION EPIDURAL; INFILTRATION; PERINEURAL at 14:39

## 2019-09-05 RX ADMIN — PROPOFOL 200 MG: 10 INJECTION, EMULSION INTRAVENOUS at 12:35

## 2019-09-05 RX ADMIN — ONDANSETRON 4 MG: 2 INJECTION INTRAMUSCULAR; INTRAVENOUS at 12:55

## 2019-09-05 RX ADMIN — LIDOCAINE HYDROCHLORIDE 50 MG: 20 INJECTION, SOLUTION EPIDURAL; INFILTRATION; INTRACAUDAL; PERINEURAL at 12:35

## 2019-09-05 RX ADMIN — DEXAMETHASONE SODIUM PHOSPHATE 10 MG: 4 INJECTION, SOLUTION INTRAMUSCULAR; INTRAVENOUS at 12:55

## 2019-09-05 RX ADMIN — GLYCOPYRROLATE 0.2 MG: 0.2 INJECTION, SOLUTION INTRAMUSCULAR; INTRAVENOUS at 13:13

## 2019-09-05 RX ADMIN — MORPHINE SULFATE 4 MG: 4 INJECTION INTRAVENOUS at 20:45

## 2019-09-05 RX ADMIN — GLYCOPYRROLATE 0.6 MG: 0.2 INJECTION, SOLUTION INTRAMUSCULAR; INTRAVENOUS at 14:22

## 2019-09-05 RX ADMIN — FAMOTIDINE 20 MG: 10 INJECTION, SOLUTION INTRAVENOUS at 20:51

## 2019-09-05 RX ADMIN — SODIUM CHLORIDE 9 ML/HR: 900 INJECTION, SOLUTION INTRAVENOUS at 11:04

## 2019-09-05 RX ADMIN — SODIUM CHLORIDE 100 ML/HR: 900 INJECTION, SOLUTION INTRAVENOUS at 21:45

## 2019-09-05 RX ADMIN — ROCURONIUM BROMIDE 10 MG: 10 INJECTION, SOLUTION INTRAVENOUS at 13:17

## 2019-09-05 RX ADMIN — MORPHINE SULFATE 4 MG: 4 INJECTION INTRAVENOUS at 18:31

## 2019-09-05 RX ADMIN — ROCURONIUM BROMIDE 5 MG: 10 INJECTION, SOLUTION INTRAVENOUS at 12:35

## 2019-09-05 RX ADMIN — FENTANYL CITRATE 50 MCG: 50 INJECTION, SOLUTION INTRAMUSCULAR; INTRAVENOUS at 14:05

## 2019-09-05 RX ADMIN — KETOROLAC TROMETHAMINE 30 MG: 30 INJECTION, SOLUTION INTRAMUSCULAR at 12:55

## 2019-09-05 RX ADMIN — FENTANYL CITRATE 125 MCG: 50 INJECTION, SOLUTION INTRAMUSCULAR; INTRAVENOUS at 12:35

## 2019-09-05 RX ADMIN — DEXAMETHASONE SODIUM PHOSPHATE 2 MG: 4 INJECTION, SOLUTION INTRAMUSCULAR; INTRAVENOUS at 14:39

## 2019-09-05 RX ADMIN — PHENYLEPHRINE HYDROCHLORIDE 200 MCG: 10 INJECTION INTRAVENOUS at 12:49

## 2019-09-05 RX ADMIN — NEOSTIGMINE METHYLSULFATE 5 MG: 1 INJECTION, SOLUTION INTRAVENOUS at 14:22

## 2019-09-05 RX ADMIN — CEFOXITIN 2 G: 2 INJECTION, POWDER, FOR SOLUTION INTRAVENOUS at 12:45

## 2019-09-05 RX ADMIN — ROCURONIUM BROMIDE 35 MG: 10 INJECTION, SOLUTION INTRAVENOUS at 12:45

## 2019-09-05 RX ADMIN — SODIUM CHLORIDE, SODIUM LACTATE, POTASSIUM CHLORIDE, AND CALCIUM CHLORIDE: .6; .31; .03; .02 INJECTION, SOLUTION INTRAVENOUS at 12:45

## 2019-09-05 RX ADMIN — CLONIDINE HYDROCHLORIDE 0.1 MG: 0.1 TABLET ORAL at 20:53

## 2019-09-05 RX ADMIN — KETAMINE HYDROCHLORIDE 25 MG: 10 INJECTION INTRAMUSCULAR; INTRAVENOUS at 13:07

## 2019-09-05 RX ADMIN — SODIUM CHLORIDE: 900 INJECTION, SOLUTION INTRAVENOUS at 12:35

## 2019-09-05 RX ADMIN — ESCITALOPRAM OXALATE 10 MG: 10 TABLET ORAL at 20:53

## 2019-09-05 NOTE — ANESTHESIA PREPROCEDURE EVALUATION
Anesthesia Evaluation     Patient summary reviewed and Nursing notes reviewed   history of anesthetic complications:  NPO Solid Status: > 8 hours  NPO Liquid Status: > 8 hours           Airway   Mallampati: III  TM distance: >3 FB  Neck ROM: full  No difficulty expected  Dental      Pulmonary - normal exam   (+) sleep apnea on CPAP,   Cardiovascular - normal exam    (+) hypertension, CAD, dysrhythmias Atrial Fib, PVD,       Neuro/Psych  GI/Hepatic/Renal/Endo    (+) morbid obesity, GERD,      Musculoskeletal     Abdominal   (+) obese,    Substance History      OB/GYN          Other        ROS/Med Hx Other: NPO  Hgb 11.8; other labs reviewed  EKG atrial fibrillation  Cardiac clearance reviewed                Anesthesia Plan    ASA 3     general     Anesthetic plan, all risks, benefits, and alternatives have been provided, discussed and informed consent has been obtained with: patient.  Use of blood products discussed with patient .

## 2019-09-05 NOTE — ANESTHESIA POSTPROCEDURE EVALUATION
Patient: Yasmeen Rahman    Procedure Summary     Date:  09/05/19 Room / Location:  [facility] OR 09 / [facility] MAIN OR    Anesthesia Start:  1226 Anesthesia Stop:  1435    Procedure:  Open right hemicolectomy (Right Abdomen) Diagnosis:       Malignant neoplasm of ascending colon (CMS/HCC)      (Malignant neoplasm of ascending colon (CMS/HCC) [C18.2])    Surgeon:  Dominick Cunha DO Provider:  Filemon Rutledge MD    Anesthesia Type:  general ASA Status:  3          Anesthesia Type: general  Last vitals  BP   128/42 (09/05/19 1040)   Temp   98.7 °F (37.1 °C) (09/05/19 1040)   Pulse   61 (09/05/19 1040)   Resp   12 (09/05/19 1040)     SpO2   96 % (09/05/19 1040)     Post Anesthesia Care and Evaluation    Patient location during evaluation: bedside  Patient participation: complete - patient participated  Level of consciousness: awake and alert  Pain score: 1  Pain management: adequate  Airway patency: patent  Anesthetic complications: No anesthetic complications  PONV Status: none  Cardiovascular status: acceptable  Respiratory status: acceptable  Hydration status: acceptable  Post Neuraxial Block status: Motor and sensory function returned to baseline

## 2019-09-05 NOTE — ANESTHESIA PROCEDURE NOTES
Arterial Line    Pre-sedation assessment completed: 9/5/2019 12:45 PM    Patient location during procedure: OR  Start time: 9/5/2019 12:45 PM   Line placed for hemodynamic monitoring.  Performed By   Anesthesiologist: Cammy Philippe MD  Preanesthetic Checklist  Completed: patient identified, site marked, surgical consent, pre-op evaluation, timeout performed, IV checked, risks and benefits discussed and monitors and equipment checked  Arterial Line Prep   Sterile Tech: gloves, gown and mask  Prep: ChloraPrep  Patient monitoring: blood pressure monitoring, continuous pulse oximetry and EKG  Arterial Line Procedure   Laterality:left  Location:  radial artery  Catheter size: 20 G   Guidance: palpation technique  Number of attempts: 1  Post Assessment   Dressing Type: wrist guard applied, occlusive dressing applied and secured with tape.   Complications no  Circ/Move/Sens Assessment: normal.   Patient Tolerance: patient tolerated the procedure well with no apparent complications

## 2019-09-05 NOTE — OP NOTE
COLON RESECTION RIGHT  Procedure Report    Patient Name:  Yasmeen Rahman  YOB: 1940    Date of Surgery:  9/5/2019     Indications: Ascending colon cancer    Pre-op Diagnosis:   Malignant neoplasm of ascending colon (CMS/HCC) [C18.2]       Post-Op Diagnosis Codes:     * Malignant neoplasm of ascending colon (CMS/HCC) [C18.2]    Procedure/CPT® Codes:      Procedure(s):  Open right hemicolectomy    Staff:  Surgeon(s):  Dominick Cunha DO    Assistant: Suni Martinez APRN    Anesthesia: General with Epidural    Anesthetist: Dr. Harrell    Estimated Blood Loss: minimal    Implants:    Implant Name Type Inv. Item Serial No.  Lot No. LRB No. Used   RELOAD STPLR LNR CUT PROX 75MM PHILL [id number] - [id number] Implant RELOAD STPLR LNR CUT PROX 75MM PHILL [id number]  [employer] . Right 2   STPLR LNR CUT PROX 75MM PHILL [id number] - [id number] Implant STPLR LNR CUT PROX 75MM PHILL [id number]  [employer] . Right 1   STPLR LNR CUT 60MM PHILL REG [id number] - [id number] Implant STPLR LNR CUT 60MM PHILL REG [id number]  [employer] . Right 1       Specimen:          Specimens     ID Source Type Tests Collected By Collected At Frozen?      A Large Intestine, Right / Ascending Colon Tissue · TISSUE PATHOLOGY EXAM   Dominick Cunha DO 9/5/19 1320 No               Findings: Colon cancer mid a sending colon, no evidence for metastatic spread    Complications: None    Description of Procedure: Yasmeen is a pleasant 79-year-old female seen in the office at the request of the folks at [facility] for newly diagnosed carcinoma of the ascending colon.  She had been anemic and work-up with upper lower endoscopy revealed the carcinoma.  She is on blood thinners Eliquis those have been stopped for 48 hours and she is ready to proceed at this point with right hemicolectomy.  She is undergone a standard 2-day Erazo Maria Eugenia bowel prep.  All questions have been answered in the office  and again this morning.    She was taken operating room placed in supine position.  General was done by Dr. Harrell.  Timeout done and identity verified abdomen prepped and draped after 3-minute dry time.  Midline incision was made from 3 inches above the umbilicus to 3 to 4 inches below the umbilicus.  Incision was carried through the skin with a 10 scalpel carried deeper with cautery down to the fascia and through the fascia into the abdominal cavity.  Once in the abdominal cavity exam showed no evidence for metastatic spread to the liver to any other solid organ no obvious large lymph nodes.  Again the procedure by mobilizing the terminal ileum which was stuck in the pelvis.  We divided the terminal ileum with a TRACI-75 and the cut ends were treated with Betadine.  We then scored the peritoneum over the lateral reflection of the ascending colon.  We then used a combination of blunt and cautery dissection to mobilize the right colon to the midline we carried this up around the hepatic flexure to just lateral to the middle colic vessels.  Transverse colon was divided there with a TRACI-75.  Again the cut ends were treated with Betadine we then took the mesentery down in a low fashion to get as many nodes as possible using the Enseal LigaSure device.  Specimen was then removed and placed to the back table.  Then brought the terminal ileum into juxtaposition with the transverse colon she had very large appendices epiploica and some of those were removed to line up the anastomosis.  Interrupted 3 oh silks were then used to approximate the bowel preparation for the anastomosis we then wall the area off with a wet towel.  An enterotomy was made in the small bowel and in the large bowel and the TRACI was passed down each limb of the bowel closed and fired creating a stapled side-to-side but functional end-to-end type anastomosis the enterotomy was then elevated with 3 Allis forceps and closed with a TX 60.  We then reinforced  the staple line with interrupted 3 oh silks.  The rent in the mesentery was then closed with interrupted 3 oh silks and the anastomosis was covered with omentum and a gram type patch was sewn using the omentum over the anastomosis.  We then returned the bowel to anatomic position and the abdomen was irrigated with saline.  EBL to that point was about 50 cc.  We then irrigated the abdomen with Irrisept and it was left in the abdominal cavity for 2 minutes.  This was then suctioned free and rinsed and then the abdominal wall was closed with #1 Ethibond sutures in a single layer.  Subcu was then irrigated with Irrisept again and after 2 minutes then the skin was approximated with skin stapler.  Sterile dressing was applied.  Anesthesia then placed tap blocks for postop pain control she was then transferred to recovery room in satisfactory condition.  The final sponge instrument needle counts were correct.        Dominick Cunha DO     Date: 9/5/2019  Time: 2:21 PM

## 2019-09-05 NOTE — ANESTHESIA PROCEDURE NOTES
Peripheral Block      Patient location during procedure: pre-op  Start time: 9/5/2019 2:15 PM  Stop time: 9/5/2019 2:24 PM  Reason for block: procedure for pain, at surgeon's request and post-op pain management  Performed by  Anesthesiologist: Almas Bobby MD  Assisted by: Hilario Ha RN  Preanesthetic Checklist  Completed: patient identified, site marked, surgical consent, pre-op evaluation, timeout performed, IV checked, risks and benefits discussed and monitors and equipment checked  Prep:  Pt Position: supine  Sterile barriers:cap, gloves, sterile barriers, mask and gown  Prep: ChloraPrep  Patient monitoring: blood pressure monitoring, continuous pulse oximetry and EKG  Procedure  Sedation:yes  Performed under: general  Guidance:ultrasound guided  ULTRASOUND INTERPRETATION. Using ultrasound guidance a 21 G gauge needle was placed in close proximity to the nerve, at which point, under ultrasound guidance anesthetic was injected in the area of the nerve and spread of the anesthesia was seen on ultrasound in close proximity thereto.  There were no abnormalities seen on ultrasound; a digital image was taken; and the patient tolerated the procedure with no complications. Images:still images obtained, printed/placed on chart    Laterality:Bilateral  Block Type:TAP  Injection Technique:single-shot  Needle Type:echogenic  Needle Gauge:21 G  Resistance on Injection: less than 15 psi    Medications Used: dexamethasone (DECADRON) injection, 2 mg  ropivacaine (NAROPIN) 0.5 % injection, 30 mL      Medications  Comment:60 cc of 0.25% ropivicaine used. 30 cc per side B ultrasounds printd and saved    Post Assessment  Injection Assessment: negative aspiration for heme, no paresthesia on injection and incremental injection  Patient Tolerance:comfortable throughout block  Complications:no

## 2019-09-06 LAB
ALBUMIN SERPL-MCNC: 3.3 G/DL (ref 3.5–4.8)
ALBUMIN/GLOB SERPL: 1.3 G/DL (ref 1–1.7)
ALP SERPL-CCNC: 71 U/L (ref 32–91)
ALT SERPL W P-5'-P-CCNC: 17 U/L (ref 14–54)
ANION GAP SERPL CALCULATED.3IONS-SCNC: 14.6 MMOL/L (ref 5–15)
ANISOCYTOSIS BLD QL: ABNORMAL
AST SERPL-CCNC: 27 U/L (ref 15–41)
BILIRUB SERPL-MCNC: 1.2 MG/DL (ref 0.3–1.2)
BUN BLD-MCNC: 6 MG/DL (ref 8–20)
BUN/CREAT SERPL: 7.5 (ref 5.4–26.2)
CALCIUM SPEC-SCNC: 8 MG/DL (ref 8.9–10.3)
CHLORIDE SERPL-SCNC: 102 MMOL/L (ref 101–111)
CO2 SERPL-SCNC: 21 MMOL/L (ref 22–32)
CREAT BLD-MCNC: 0.8 MG/DL (ref 0.4–1)
DEPRECATED RDW RBC AUTO: 49 FL (ref 37–54)
ERYTHROCYTE [DISTWIDTH] IN BLOOD BY AUTOMATED COUNT: 16.1 % (ref 12.3–15.4)
GFR SERPL CREATININE-BSD FRML MDRD: 69 ML/MIN/1.73
GLOBULIN UR ELPH-MCNC: 2.6 GM/DL (ref 2.5–3.8)
GLUCOSE BLD-MCNC: 163 MG/DL (ref 65–99)
HCT VFR BLD AUTO: 37.4 % (ref 34–46.6)
HGB BLD-MCNC: 12.6 G/DL (ref 12–15.9)
LYMPHOCYTES # BLD MANUAL: 0.74 10*3/MM3 (ref 0.7–3.1)
LYMPHOCYTES NFR BLD MANUAL: 2 % (ref 5–12)
LYMPHOCYTES NFR BLD MANUAL: 7 % (ref 19.6–45.3)
MAGNESIUM SERPL-MCNC: 1.5 MG/DL (ref 1.8–2.5)
MCH RBC QN AUTO: 28.6 PG (ref 26.6–33)
MCHC RBC AUTO-ENTMCNC: 33.7 G/DL (ref 31.5–35.7)
MCV RBC AUTO: 84.9 FL (ref 79–97)
MONOCYTES # BLD AUTO: 0.21 10*3/MM3 (ref 0.1–0.9)
NEUTROPHILS # BLD AUTO: 9.56 10*3/MM3 (ref 1.7–7)
NEUTROPHILS NFR BLD MANUAL: 78 % (ref 42.7–76)
NEUTS BAND NFR BLD MANUAL: 13 % (ref 0–5)
NEUTS VAC BLD QL SMEAR: ABNORMAL
NRBC SPEC MANUAL: 1 /100 WBC (ref 0–0.2)
PHOSPHATE SERPL-MCNC: 4 MG/DL (ref 2.4–4.7)
PLAT MORPH BLD: NORMAL
PLATELET # BLD AUTO: 195 10*3/MM3 (ref 140–450)
PMV BLD AUTO: 7.5 FL (ref 6–12)
POTASSIUM BLD-SCNC: 3.6 MMOL/L (ref 3.6–5.1)
PROT SERPL-MCNC: 5.9 G/DL (ref 6.1–7.9)
RBC # BLD AUTO: 4.4 10*6/MM3 (ref 3.77–5.28)
SCAN SLIDE: NORMAL
SODIUM BLD-SCNC: 134 MMOL/L (ref 136–144)
TOXIC GRANULATION: ABNORMAL
WBC NRBC COR # BLD: 10.5 10*3/MM3 (ref 3.4–10.8)

## 2019-09-06 PROCEDURE — 25010000002 MORPHINE PER 10 MG: Performed by: SURGERY

## 2019-09-06 PROCEDURE — 99024 POSTOP FOLLOW-UP VISIT: CPT | Performed by: SURGERY

## 2019-09-06 PROCEDURE — 84100 ASSAY OF PHOSPHORUS: CPT | Performed by: SURGERY

## 2019-09-06 PROCEDURE — 85007 BL SMEAR W/DIFF WBC COUNT: CPT | Performed by: SURGERY

## 2019-09-06 PROCEDURE — 80053 COMPREHEN METABOLIC PANEL: CPT | Performed by: SURGERY

## 2019-09-06 PROCEDURE — 83735 ASSAY OF MAGNESIUM: CPT | Performed by: SURGERY

## 2019-09-06 PROCEDURE — 85025 COMPLETE CBC W/AUTO DIFF WBC: CPT | Performed by: SURGERY

## 2019-09-06 PROCEDURE — 25010000002 CEFOXITIN PER 1 G: Performed by: SURGERY

## 2019-09-06 RX ADMIN — GABAPENTIN 100 MG: 100 CAPSULE ORAL at 14:07

## 2019-09-06 RX ADMIN — ESCITALOPRAM OXALATE 10 MG: 10 TABLET ORAL at 09:15

## 2019-09-06 RX ADMIN — GABAPENTIN 100 MG: 100 CAPSULE ORAL at 21:12

## 2019-09-06 RX ADMIN — LOSARTAN POTASSIUM 25 MG: 25 TABLET, FILM COATED ORAL at 09:15

## 2019-09-06 RX ADMIN — OXYCODONE HYDROCHLORIDE AND ACETAMINOPHEN 500 MG: 500 TABLET ORAL at 09:15

## 2019-09-06 RX ADMIN — ACETAMINOPHEN 1000 MG: 500 TABLET, FILM COATED ORAL at 09:15

## 2019-09-06 RX ADMIN — FEBUXOSTAT 40 MG: 40 TABLET ORAL at 09:15

## 2019-09-06 RX ADMIN — GABAPENTIN 100 MG: 100 CAPSULE ORAL at 05:52

## 2019-09-06 RX ADMIN — GABAPENTIN 100 MG: 100 CAPSULE ORAL at 17:31

## 2019-09-06 RX ADMIN — CLONIDINE HYDROCHLORIDE 0.1 MG: 0.1 TABLET ORAL at 21:17

## 2019-09-06 RX ADMIN — FAMOTIDINE 20 MG: 10 INJECTION, SOLUTION INTRAVENOUS at 21:13

## 2019-09-06 RX ADMIN — RIVAROXABAN 20 MG: 20 TABLET, FILM COATED ORAL at 17:31

## 2019-09-06 RX ADMIN — CEFOXITIN 2 G: 2 INJECTION, POWDER, FOR SOLUTION INTRAVENOUS at 05:11

## 2019-09-06 RX ADMIN — GABAPENTIN 100 MG: 100 CAPSULE ORAL at 10:14

## 2019-09-06 RX ADMIN — LEVOTHYROXINE SODIUM 100 MCG: 100 TABLET ORAL at 05:53

## 2019-09-06 RX ADMIN — BUMETANIDE 2 MG: 1 TABLET ORAL at 09:15

## 2019-09-06 RX ADMIN — AMLODIPINE BESYLATE 10 MG: 5 TABLET ORAL at 09:15

## 2019-09-06 RX ADMIN — METOPROLOL TARTRATE 100 MG: 50 TABLET, FILM COATED ORAL at 09:15

## 2019-09-06 RX ADMIN — MORPHINE SULFATE 4 MG: 4 INJECTION INTRAVENOUS at 01:44

## 2019-09-06 RX ADMIN — CEFOXITIN 2 G: 2 INJECTION, POWDER, FOR SOLUTION INTRAVENOUS at 10:14

## 2019-09-06 RX ADMIN — CLONIDINE HYDROCHLORIDE 0.1 MG: 0.1 TABLET ORAL at 09:15

## 2019-09-06 RX ADMIN — FAMOTIDINE 20 MG: 10 INJECTION, SOLUTION INTRAVENOUS at 09:15

## 2019-09-06 RX ADMIN — MORPHINE SULFATE 4 MG: 4 INJECTION INTRAVENOUS at 10:14

## 2019-09-06 RX ADMIN — SODIUM CHLORIDE 100 ML/HR: 900 INJECTION, SOLUTION INTRAVENOUS at 21:19

## 2019-09-06 NOTE — PLAN OF CARE
Problem: Patient Care Overview  Goal: Plan of Care Review  Outcome: Ongoing (interventions implemented as appropriate)   09/06/19 0229   Coping/Psychosocial   Plan of Care Reviewed With patient   Plan of Care Review   Progress no change   OTHER   Outcome Summary post op pain, n/v, abd binder. not up oob yet.     Goal: Discharge Needs Assessment  Outcome: Ongoing (interventions implemented as appropriate)   09/06/19 0229   Discharge Needs Assessment   Patient/Family Anticipates Transition to home with family   Patient/Family Anticipated Services at Transition none   Transportation Concerns car, none   Transportation Anticipated family or friend will provide   Disability   Equipment Currently Used at Home bipap/cpap       Problem: Fall Risk (Adult)  Goal: Identify Related Risk Factors and Signs and Symptoms  Outcome: Ongoing (interventions implemented as appropriate)   09/06/19 0229   Fall Risk (Adult)   Related Risk Factors (Fall Risk) age-related changes;history of falls;polypharmacy   Signs and Symptoms (Fall Risk) presence of risk factors     Goal: Absence of Fall  Outcome: Ongoing (interventions implemented as appropriate)   09/06/19 0229   Fall Risk (Adult)   Absence of Fall making progress toward outcome       Problem: Pain, Acute (Adult)  Goal: Identify Related Risk Factors and Signs and Symptoms  Outcome: Ongoing (interventions implemented as appropriate)   09/06/19 0229   Pain, Acute (Adult)   Related Risk Factors (Acute Pain) surgery;patient perception   Signs and Symptoms (Acute Pain) verbalization of pain descriptors;moaning;nausea/vomiting/anorexia     Goal: Acceptable Pain Control/Comfort Level  Outcome: Ongoing (interventions implemented as appropriate)   09/06/19 0229   Pain, Acute (Adult)   Acceptable Pain Control/Comfort Level making progress toward outcome  (prn morphine)       Problem: Surgery Nonspecified (Adult)  Goal: Signs and Symptoms of Listed Potential Problems Will be Absent, Minimized or  Managed (Surgery Nonspecified)  Outcome: Ongoing (interventions implemented as appropriate)   09/06/19 0229   Goal/Outcome Evaluation   Problems Assessed (Surgery) pain;VTE (venous thromboembolism);postoperative nausea and vomiting   Problems Present (Surgery) pain;postoperative nausea and vomiting

## 2019-09-06 NOTE — PROGRESS NOTES
Doing well, she has ambulated and passed flatus.     Vitals:    09/05/19 2201 09/06/19 0219 09/06/19 0518 09/06/19 1030   BP: 122/75 135/72 131/75 120/76   BP Location: Left arm Left arm Left arm Left arm   Patient Position: Lying Lying Lying Sitting   Pulse: 83 79 77 61   Resp: 14 14 16 16   Temp: 98.6 °F (37 °C) 98.6 °F (37 °C) 98.3 °F (36.8 °C) 98.1 °F (36.7 °C)   TempSrc: Oral Oral Oral Oral   SpO2: 92% 91% 92% 91%   Weight:       Height:             Lungs ctab  cv rrr  abd bandage dry, ND    A/p    Pod 1 right hemicolectomy for cancer  -vitals and labs stable  -will continue clear liquids, try full liquid tomorrow  -restart plavix

## 2019-09-06 NOTE — CONSULTS
Referring Provider: Dominick Cunha DO  Reason for Consultation:  medical management    Patient Care Team:  Alex Rodriguez MD as PCP - General  Alex Rodriguez MD as PCP - Claims Attributed  Alex Rodriguez MD as PCP - Family Medicine    Chief complaint Status post open R hemicolectomy    Subjective .     History of present illness:  Yasmeen Rahman is a 79 y.o. female who underwent open right hemicolectomy 9/5/2019 by Dr. Cunha for malignant neoplasm of the ascending colon. She was diagnosed with invasive moderately differentiated adenocarcinoma of the ascending colon by Dr. Rangel. She was found to have liver mass and splenic mass that were benign. She also has PMH of afib on xarelto, HTN, HLD, PAD, GIOVANNA, and CKD. The hospitalist group was consulted for medical management. She complained of nausea and abdominal pain upon exam.       Review of Systems  Pertinent items are noted in HPI, all other systems reviewed and negative    History  Past Medical History:   Diagnosis Date   • Anemia 2018   • Anxiety 2018   • Arthritis    • Atrial fibrillation (CMS/HCC)    • CAD (coronary artery disease) 2013   • CKD (chronic kidney disease) 2019   • Colon cancer (CMS/HCC)    • GERD (gastroesophageal reflux disease) 2009   • Gout 2009   • High cholesterol 1989   • Hypertension 1989   • Hypothyroidism 2004   • Lupus    • PHOENIX (obstructive sleep apnea)    • Osteopenia 2015   • Osteoporosis 2004   • PONV (postoperative nausea and vomiting)    • Rheumatoid arthritis (CMS/HCC) 2009   ,   Past Surgical History:   Procedure Laterality Date   • CARDIAC CATHETERIZATION      no stents   • CHOLECYSTECTOMY  1990    laparoscopic    • COLONOSCOPY     • HEEL SPUR EXCISION Right 1994   • HYSTERECTOMY  1994    total due to heavy beeding and fibroids   • JOINT REPLACEMENT     • TONSILLECTOMY  1962   • TOTAL HIP ARTHROPLASTY Right 2010   ,   Family History   Problem Relation Age of Onset   • Colon cancer Brother 71   ,   Social  History     Tobacco Use   • Smoking status: Never Smoker   • Smokeless tobacco: Never Used   Substance Use Topics   • Alcohol use: No     Frequency: Never   • Drug use: No    and Allergies:  Codeine and Hydrocodone-acetaminophen    Objective     Vital Signs   Temp:  [97 °F (36.1 °C)-98.7 °F (37.1 °C)] 97.7 °F (36.5 °C)  Heart Rate:  [] 108  Resp:  [12-21] 18  BP: (128-167)/(42-89) 157/88    Physical Exam:     General Appearance:    Alert, cooperative, in no acute distress   Head:    Normocephalic, without obvious abnormality, atraumatic   Eyes:            Lids and lashes normal, conjunctivae and sclerae normal, no   icterus, no pallor, corneas clear, PERRLA   Neck:   No adenopathy, supple, trachea midline   Back:     range of motion normal   Lungs:     Clear to auscultation,respirations regular, even and                   unlabored    Heart:    Regular rhythm and normal rate, normal S1 and S2, no            murmur   Breast Exam:    Deferred   Abdomen:     Normal bowel sounds, abdominal binder in place with surgical dressing beneath. Diffuse tenderness on exam   Genitalia:    Deferred   Extremities:   Moves all extremities well, no edema, no cyanosis, no              redness   Pulses:   Pulses palpable and equal bilaterally   Skin:   No bleeding, bruising or rash   Lymph nodes:   No palpable adenopathy   Neurologic:   Cranial nerves 2 - 12 grossly intact         Assessment/Plan     Malignant neoplasm of ascending colon (CMS/HCC) status post open Right hemicolectomy by Dr. Cunha 9/5/2019.   - on IV mefoxin for 3 doses  - pain control, IVFs, and activity per surgery  - followed by Dr. Rangel outpatient    H/o paroxysmal afib  - cont home metoprolol  - xarelto on hold due to surgery    Hypertension  - cont home losartan, metoprolol, norvasc, clonidine, bumex    Hypothyroidism  - cont home synthroid    PAD    Iron deficiency anemia  - monitor hgb    CKD stage IV   - pt's BUN normal and Cr 1.1 on  8/26    Depression  - cont home lexapro    DVT prophylaxis - SCDs        Digna Garcia, PAIGE  09/05/19  9:02 PM

## 2019-09-06 NOTE — SIGNIFICANT NOTE
09/06/19 1044   Pastoral/Spiritual Care   Pastoral Care Visit Type initial   Pastoral Care Source patient request (describe)   Receptivity to Spiritual Care visit welcomed   Pastoral Care Interventions other (see comments)  (pastoral presence, offer of spiritual care. )   Pastoral Care Response thanks expressed   Use of Spiritual Resources non-[facility] use of spiritual care   Pastoral Care Follow-Up follow-up, none required as presently assessed     Visit because pt requested chap but no consult was issued. Pt had no needs but was joyful that she is post procedure and all seems to be a success. She engaged chap in some family history and was glad for the visit. She had no spiritual needs. She will call chap if those needs change. Thankful.

## 2019-09-06 NOTE — PLAN OF CARE
Problem: Patient Care Overview  Goal: Plan of Care Review  Outcome: Ongoing (interventions implemented as appropriate)   09/06/19 1532   Coping/Psychosocial   Plan of Care Reviewed With patient   Plan of Care Review   Progress improving       Problem: Fall Risk (Adult)  Goal: Absence of Fall  Outcome: Ongoing (interventions implemented as appropriate)      Problem: Pain, Acute (Adult)  Goal: Acceptable Pain Control/Comfort Level  Outcome: Ongoing (interventions implemented as appropriate)      Problem: Surgery Nonspecified (Adult)  Goal: Signs and Symptoms of Listed Potential Problems Will be Absent, Minimized or Managed (Surgery Nonspecified)  Outcome: Ongoing (interventions implemented as appropriate)      Problem: Skin Injury Risk (Adult)  Goal: Skin Health and Integrity  Outcome: Ongoing (interventions implemented as appropriate)

## 2019-09-06 NOTE — PROGRESS NOTES
Discharge Planning Assessment  [facility]     Patient Name: Yasmeen Rahman  MRN: [id number]  Today's Date: 9/6/2019    Admit Date: 9/5/2019    Discharge Needs Assessment     Row Name 09/06/19 1219       Living Environment    Lives With  spouse;child(judy), adult;grandchild(judy)    Current Living Arrangements  home/apartment/condo    Primary Care Provided by  self    Provides Primary Care For  no one    Family Caregiver if Needed  spouse    Able to Return to Prior Arrangements  yes       Resource/Environmental Concerns    Resource/Environmental Concerns  none    Transportation Concerns  car, none       Transition Planning    Patient/Family Anticipates Transition to  home with family    Patient/Family Anticipated Services at Transition  none    Transportation Anticipated  family or friend will provide       Discharge Needs Assessment    Readmission Within the Last 30 Days  no previous admission in last 30 days    Concerns to be Addressed  denies needs/concerns at this time    Equipment Currently Used at Home  bipap/cpap;walker, standard;rollator;oxygen;cane, straight oxygen from August    Equipment Needed After Discharge  none    Offered/Gave Vendor List  yes        Discharge Plan     Row Name 09/06/19 1224       Plan    Plan  home with family, denies needs    Patient/Family in Agreement with Plan  yes               Amairani Fox RN

## 2019-09-07 PROCEDURE — 99024 POSTOP FOLLOW-UP VISIT: CPT | Performed by: SURGERY

## 2019-09-07 PROCEDURE — 94799 UNLISTED PULMONARY SVC/PX: CPT

## 2019-09-07 PROCEDURE — 25010000002 MORPHINE PER 10 MG: Performed by: SURGERY

## 2019-09-07 PROCEDURE — 25010000002 ONDANSETRON PER 1 MG: Performed by: SURGERY

## 2019-09-07 RX ORDER — HYDROCODONE BITARTRATE AND ACETAMINOPHEN 5; 325 MG/1; MG/1
1 TABLET ORAL EVERY 4 HOURS PRN
Status: DISCONTINUED | OUTPATIENT
Start: 2019-09-07 | End: 2019-09-11

## 2019-09-07 RX ADMIN — ACETAMINOPHEN 1000 MG: 500 TABLET, FILM COATED ORAL at 08:25

## 2019-09-07 RX ADMIN — SODIUM CHLORIDE 100 ML/HR: 900 INJECTION, SOLUTION INTRAVENOUS at 08:26

## 2019-09-07 RX ADMIN — ONDANSETRON 4 MG: 2 INJECTION INTRAMUSCULAR; INTRAVENOUS at 16:03

## 2019-09-07 RX ADMIN — LEVOTHYROXINE SODIUM 100 MCG: 100 TABLET ORAL at 06:00

## 2019-09-07 RX ADMIN — GABAPENTIN 100 MG: 100 CAPSULE ORAL at 06:00

## 2019-09-07 RX ADMIN — MORPHINE SULFATE 2 MG: 4 INJECTION INTRAVENOUS at 17:10

## 2019-09-07 RX ADMIN — RIVAROXABAN 20 MG: 20 TABLET, FILM COATED ORAL at 17:09

## 2019-09-07 RX ADMIN — FEBUXOSTAT 40 MG: 40 TABLET ORAL at 08:26

## 2019-09-07 RX ADMIN — CLONIDINE HYDROCHLORIDE 0.1 MG: 0.1 TABLET ORAL at 21:34

## 2019-09-07 RX ADMIN — METOPROLOL TARTRATE 100 MG: 50 TABLET, FILM COATED ORAL at 21:34

## 2019-09-07 RX ADMIN — OXYCODONE HYDROCHLORIDE AND ACETAMINOPHEN 500 MG: 500 TABLET ORAL at 08:25

## 2019-09-07 RX ADMIN — FAMOTIDINE 20 MG: 10 INJECTION, SOLUTION INTRAVENOUS at 19:41

## 2019-09-07 RX ADMIN — LOSARTAN POTASSIUM 25 MG: 25 TABLET, FILM COATED ORAL at 08:25

## 2019-09-07 RX ADMIN — GABAPENTIN 100 MG: 100 CAPSULE ORAL at 21:34

## 2019-09-07 RX ADMIN — BUMETANIDE 2 MG: 1 TABLET ORAL at 08:25

## 2019-09-07 RX ADMIN — GABAPENTIN 100 MG: 100 CAPSULE ORAL at 17:09

## 2019-09-07 RX ADMIN — GABAPENTIN 100 MG: 100 CAPSULE ORAL at 12:15

## 2019-09-07 RX ADMIN — ONDANSETRON 4 MG: 2 INJECTION INTRAMUSCULAR; INTRAVENOUS at 21:41

## 2019-09-07 RX ADMIN — ESCITALOPRAM OXALATE 10 MG: 10 TABLET ORAL at 08:25

## 2019-09-07 RX ADMIN — GABAPENTIN 100 MG: 100 CAPSULE ORAL at 14:56

## 2019-09-07 RX ADMIN — CLONIDINE HYDROCHLORIDE 0.1 MG: 0.1 TABLET ORAL at 08:25

## 2019-09-07 RX ADMIN — AMLODIPINE BESYLATE 10 MG: 5 TABLET ORAL at 08:25

## 2019-09-07 RX ADMIN — METOPROLOL TARTRATE 100 MG: 50 TABLET, FILM COATED ORAL at 08:25

## 2019-09-07 RX ADMIN — FAMOTIDINE 20 MG: 10 INJECTION, SOLUTION INTRAVENOUS at 08:26

## 2019-09-07 NOTE — PROGRESS NOTES
She has had a bowel movement and tolerating clear liquids and is ambulating    Vitals:    09/06/19 1030 09/06/19 1516 09/06/19 2115 09/07/19 0624   BP: 120/76 127/81 118/74 132/82   BP Location: Left arm Left arm Left arm Right arm   Patient Position: Sitting Sitting Lying Lying   Pulse: 61 65 60 66   Resp: 16 16 16 16   Temp: 98.1 °F (36.7 °C) 98 °F (36.7 °C) 98 °F (36.7 °C) 97.8 °F (36.6 °C)   TempSrc: Oral Oral Oral Oral   SpO2: 91% 95% 95% 95%   Weight:       Height:             Lungs are clear  Abdomen nontender the incision has no erythema  Extremities nonedematous    Labs are stable    Assessment and plan    Postoperative day 2 right hemicolectomy for cancer patient is doing very well.  Will advance diet to full liquids and also stop IV fluids.  She is on Plavix.    Anticipate discharge home Monday.

## 2019-09-07 NOTE — PLAN OF CARE
Problem: Patient Care Overview  Goal: Plan of Care Review  Outcome: Ongoing (interventions implemented as appropriate)      Problem: Fall Risk (Adult)  Goal: Identify Related Risk Factors and Signs and Symptoms  Outcome: Ongoing (interventions implemented as appropriate)    Goal: Absence of Fall  Outcome: Ongoing (interventions implemented as appropriate)      Problem: Pain, Acute (Adult)  Goal: Identify Related Risk Factors and Signs and Symptoms  Outcome: Ongoing (interventions implemented as appropriate)    Goal: Acceptable Pain Control/Comfort Level  Outcome: Ongoing (interventions implemented as appropriate)      Problem: Surgery Nonspecified (Adult)  Goal: Signs and Symptoms of Listed Potential Problems Will be Absent, Minimized or Managed (Surgery Nonspecified)  Outcome: Ongoing (interventions implemented as appropriate)      Problem: Skin Injury Risk (Adult)  Goal: Identify Related Risk Factors and Signs and Symptoms  Outcome: Ongoing (interventions implemented as appropriate)    Goal: Skin Health and Integrity  Outcome: Ongoing (interventions implemented as appropriate)

## 2019-09-07 NOTE — PROGRESS NOTES
Continued Stay Note  [facility]     Patient Name: Yasmeen Rahman  MRN: [id number]  Today's Date: 9/7/2019    Admit Date: 9/5/2019    Discharge Plan     Row Name 09/07/19 1726       Plan    Plan  DC PLAN: Routine to home: pending PT recommendations.      Plan Comments  Pt has an open right hemicolectomy due to malignant neoplasm of ascending colon.  PT ordered on 9/7/19 to assess mobility and make recommendations on appropriate discharge setting.  Noted general surgery is anticipating discharge on 9/9/19. Will continue to follow and update as needed.         Discharge Codes    No documentation.             Elizabeth Iraheta RN     Full Thickness Lip Wedge Repair (Flap) Text: Given the location of the defect and the proximity to free margins a full thickness wedge repair was deemed most appropriate.  Using a sterile surgical marker, the appropriate repair was drawn incorporating the defect and placing the expected incisions perpendicular to the vermilion border.  The vermilion border was also meticulously outlined to ensure appropriate reapproximation during the repair.  The area thus outlined was incised through and through with a #15 scalpel blade.  The muscularis and dermis were reaproximated with deep sutures following hemostasis. Care was taken to realign the vermilion border before proceeding with the superficial closure.  Once the vermilion was realigned the superfical and mucosal closure was finished.

## 2019-09-07 NOTE — CONSULTS
Inpatient Family Practice Consult  Consult performed by: Becki Roberts APRN  Consult ordered by: Dominick Cunha DO          Patient Care Team:  Alex Rodriguez MD as PCP - General  Alex Rodriguez MD as PCP - Claims Attributed  Alex Rodriguez MD as PCP - Family Medicine    Chief complaint: s/p right hemicolectomy for cancer    Subjective     Patient is a pleasant 71-year-old female seen by Dr. Alex Rodriguez.  She was admitted to the hospital for right hemicolectomy per Dr. Cunha.  She was recently diagnosed with an ascending colon carcinoma by Dr. Maurer.  She is 2 days postop now and is ambulating well.  Her pain is well controlled.  She has no concerns today.        Review of Systems   Constitutional: Negative for chills and fever.   Respiratory: Positive for cough. Negative for shortness of breath.    Cardiovascular: Negative for chest pain and palpitations.   Gastrointestinal: Negative for nausea and vomiting.   Musculoskeletal: Negative for back pain.   Neurological: Negative for dizziness.        Past Medical History:   Diagnosis Date   • Anemia 2018   • Anxiety 2018   • Arthritis    • Atrial fibrillation (CMS/HCC)    • CAD (coronary artery disease) 2013   • CKD (chronic kidney disease) 2019   • Colon cancer (CMS/HCC)    • GERD (gastroesophageal reflux disease) 2009   • Gout 2009   • High cholesterol 1989   • Hypertension 1989   • Hypothyroidism 2004   • Lupus    • PHOENIX (obstructive sleep apnea)    • Osteopenia 2015   • Osteoporosis 2004   • PONV (postoperative nausea and vomiting)    • Rheumatoid arthritis (CMS/HCC) 2009   ,   Past Surgical History:   Procedure Laterality Date   • CARDIAC CATHETERIZATION      no stents   • CHOLECYSTECTOMY  1990    laparoscopic    • COLON RESECTION Right 9/5/2019    Procedure: Open right hemicolectomy;  Surgeon: Dominick Cunha DO;  Location: [facility];  Service: General   • COLONOSCOPY     • HEEL SPUR EXCISION Right 1994   • HYSTERECTOMY  1994    total due to  heavy beeding and fibroids   • JOINT REPLACEMENT     • TONSILLECTOMY     • TOTAL HIP ARTHROPLASTY Right    ,   Family History   Problem Relation Age of Onset   • Colon cancer Brother 71   ,   Social History     Tobacco Use   • Smoking status: Never Smoker   • Smokeless tobacco: Never Used   Substance Use Topics   • Alcohol use: No     Frequency: Never   • Drug use: No   ,   Medications Prior to Admission   Medication Sig Dispense Refill Last Dose   • Acetaminophen (TYLENOL 8 HOUR ARTHRITIS PAIN PO) Take 650 mg by mouth 2 (Two) Times a Day.   Past Week at Unknown time   • amLODIPine (NORVASC) 10 MG tablet Daily.   2019 at Unknown time   • b complex vitamins tablet B COMPLEX TABS   Past Week at Unknown time   • bumetanide (BUMEX) 2 MG tablet Take 2 mg by mouth Daily.   2019 at Unknown time   • Calcium Carbonate-Vitamin D (CALCIUM 600+D) 600-200 MG-UNIT tablet Take 1,200 mg by mouth Daily.   Past Week at Unknown time   • Cholecalciferol (VITAMIN D3) 2000 units capsule VITAMIN D3 2000 UNIT CAPS   Past Week at Unknown time   • CloNIDine (CATAPRES) 0.1 MG tablet TAKE 1 TABLET TWICE A DAY   2019 at Unknown time   • Cranberry 125 MG tablet Take 500 mg by mouth.   Past Week at Unknown time   • cyanocobalamin (VITAMIN B-12) 2500 MCG tablet tablet 1000 mcg   Past Week at Unknown time   • [] erythromycin base (E-MYCIN) 250 MG tablet Take 250 mg by mouth 4 (Four) Times a Day.      • escitalopram (LEXAPRO) 10 MG tablet Take 10 mg by mouth Daily.   2019 at Unknown time   • ferrous sulfate 325 (65 FE) MG tablet TAKE 1 TABLET BY MOUTh DAILY WITH MEALS   Past Week at Unknown time   • gabapentin (NEURONTIN) 100 MG capsule Take 1 capsule by mouth 5 (Five) Times a Day. 450 capsule 0 2019 at Unknown time   • Hyaluronan (ORTHOVISC) 30 MG/2ML solution prefilled syringe injection Inject 2 mL into the appropriate joint as directed by provider.   Past Month at Unknown time   • hydroxychloroquine (PLAQUENIL)  200 MG tablet 2 (Two) Times a Day.   2019 at Unknown time   • levothyroxine (SYNTHROID, LEVOTHROID) 100 MCG tablet Take 100 mcg by mouth Daily.   2019 at Unknown time   • metoprolol tartrate (LOPRESSOR) 100 MG tablet TAKE 1 TABLET TWICE A DAY   2019 at Unknown time   • [] neomycin (MYCIFRADIN) 500 MG tablet Take 500 mg by mouth 4 (Four) Times a Day.      • omeprazole (priLOSEC) 40 MG capsule Daily.   2019 at Unknown time   • potassium chloride (K-DUR) 10 MEQ CR tablet 10 mEq Daily.   2019 at Unknown time   • pravastatin (PRAVACHOL) 40 MG tablet Take 40 mg by mouth Daily.   Past Week at Unknown time   • ULORIC 40 MG tablet Daily.   2019 at Unknown time   • valsartan (DIOVAN) 160 MG tablet 160 mg Daily.   2019 at Unknown time   • vitamin C (ASCORBIC ACID) 500 MG tablet Take 500 mg by mouth Daily.   Past Week at Unknown time   • acetaminophen (TYLENOL) 500 MG tablet Take 2 tablets by mouth Daily.   Not Taking   • aspirin 81 MG tablet Take 81 mg by mouth Daily.   2019   • denosumab (PROLIA) 60 MG/ML solution prefilled syringe syringe PROLIA 60 MG/ML SOSY   More than a month at Unknown time   • ondansetron (ZOFRAN) 4 MG tablet Take 1 tablet by mouth As Needed for Nausea or Vomiting. 20 tablet 0    • SYNTHROID 100 MCG tablet 100 mcg Daily.   Taking   • XARELTO 20 MG tablet    2019   , Scheduled Meds:      acetaminophen 1,000 mg Oral Daily   amLODIPine 10 mg Oral Daily   bumetanide 2 mg Oral Daily   CloNIDine 0.1 mg Oral BID   escitalopram 10 mg Oral Daily   famotidine 20 mg Intravenous BID   febuxostat 40 mg Oral Daily   gabapentin 100 mg Oral 5x Daily   levothyroxine 100 mcg Oral Daily   losartan 25 mg Oral Q24H   metoprolol tartrate 100 mg Oral BID   rivaroxaban 20 mg Oral Daily With Dinner   vitamin C 500 mg Oral Daily   , Continuous Infusions:      sodium chloride 9 mL/hr Last Rate: 9 mL/hr (19 1104)   , PRN Meds:  HYDROcodone-acetaminophen  •  meperidine  •  Morphine  **AND** naloxone  •  Morphine **AND** naloxone  •  ondansetron **OR** ondansetron  •  sodium chloride and Allergies:  Codeine and Hydrocodone-acetaminophen    Objective      Vital Signs  Temp:  [97.5 °F (36.4 °C)-98 °F (36.7 °C)] 97.5 °F (36.4 °C)  Heart Rate:  [60-72] 72  Resp:  [16] 16  BP: (118-159)/(74-96) 159/96    Physical Exam   Constitutional: She is oriented to person, place, and time. She appears well-developed and well-nourished.   HENT:   Head: Normocephalic and atraumatic.   Neck: Normal range of motion. Neck supple.   Cardiovascular: Normal rate, regular rhythm and normal heart sounds.   Pulmonary/Chest: Effort normal and breath sounds normal.   Abdominal: Bowel sounds are normal. There is no tenderness.   Neurological: She is alert and oriented to person, place, and time.   Skin: Skin is warm and dry.   Psychiatric: She has a normal mood and affect. Her behavior is normal. Judgment and thought content normal.       Results Review:   Lab Results (last 24 hours)     ** No results found for the last 24 hours. **         Imaging Results (last 24 hours)     ** No results found for the last 24 hours. **           I reviewed the patient's new clinical results.        Assessment/Plan       Malignant neoplasm of ascending colon (CMS/HCC)    Atrial fibrillation (CMS/HCC)    Hypertension    Anemia    CKD (chronic kidney disease)    Carcinoma of ascending colon (CMS/HCC)      Assessment:    Condition: In stable condition.  Improving.       Plan:   (Continue current postoperative management.  Check a.m. labs.).       I discussed the patients findings and my recommendations with patient, family and primary care team    PAIGE Harrison  09/07/19  3:47 PM

## 2019-09-08 ENCOUNTER — APPOINTMENT (OUTPATIENT)
Dept: GENERAL RADIOLOGY | Facility: HOSPITAL | Age: 79
End: 2019-09-08

## 2019-09-08 LAB
ALBUMIN SERPL-MCNC: 3 G/DL (ref 3.5–4.8)
ALBUMIN SERPL-MCNC: 3.2 G/DL (ref 3.5–4.8)
ALBUMIN/GLOB SERPL: 1.1 G/DL (ref 1–1.7)
ALBUMIN/GLOB SERPL: 1.2 G/DL (ref 1–1.7)
ALP SERPL-CCNC: 71 U/L (ref 32–91)
ALP SERPL-CCNC: 71 U/L (ref 32–91)
ALT SERPL W P-5'-P-CCNC: 24 U/L (ref 14–54)
ALT SERPL W P-5'-P-CCNC: 24 U/L (ref 14–54)
ANION GAP SERPL CALCULATED.3IONS-SCNC: 14.9 MMOL/L (ref 5–15)
ANION GAP SERPL CALCULATED.3IONS-SCNC: 15.1 MMOL/L (ref 5–15)
AST SERPL-CCNC: 18 U/L (ref 15–41)
AST SERPL-CCNC: 23 U/L (ref 15–41)
BASOPHILS # BLD AUTO: 0 10*3/MM3 (ref 0–0.2)
BASOPHILS # BLD AUTO: 0 10*3/MM3 (ref 0–0.2)
BASOPHILS NFR BLD AUTO: 0.1 % (ref 0–1.5)
BASOPHILS NFR BLD AUTO: 0.3 % (ref 0–1.5)
BILIRUB SERPL-MCNC: 1.1 MG/DL (ref 0.3–1.2)
BILIRUB SERPL-MCNC: 1.1 MG/DL (ref 0.3–1.2)
BILIRUB UR QL STRIP: NEGATIVE
BUN BLD-MCNC: 6 MG/DL (ref 8–20)
BUN BLD-MCNC: 7 MG/DL (ref 8–20)
BUN/CREAT SERPL: 10 (ref 5.4–26.2)
BUN/CREAT SERPL: 8.6 (ref 5.4–26.2)
CALCIUM SPEC-SCNC: 7.9 MG/DL (ref 8.9–10.3)
CALCIUM SPEC-SCNC: 8 MG/DL (ref 8.9–10.3)
CHLORIDE SERPL-SCNC: 100 MMOL/L (ref 101–111)
CHLORIDE SERPL-SCNC: 98 MMOL/L (ref 101–111)
CLARITY UR: CLEAR
CO2 SERPL-SCNC: 23 MMOL/L (ref 22–32)
CO2 SERPL-SCNC: 24 MMOL/L (ref 22–32)
COLOR UR: YELLOW
CREAT BLD-MCNC: 0.7 MG/DL (ref 0.4–1)
CREAT BLD-MCNC: 0.7 MG/DL (ref 0.4–1)
DEPRECATED RDW RBC AUTO: 49.9 FL (ref 37–54)
DEPRECATED RDW RBC AUTO: 50.8 FL (ref 37–54)
EOSINOPHIL # BLD AUTO: 0.1 10*3/MM3 (ref 0–0.4)
EOSINOPHIL # BLD AUTO: 0.1 10*3/MM3 (ref 0–0.4)
EOSINOPHIL NFR BLD AUTO: 0.6 % (ref 0.3–6.2)
EOSINOPHIL NFR BLD AUTO: 0.7 % (ref 0.3–6.2)
ERYTHROCYTE [DISTWIDTH] IN BLOOD BY AUTOMATED COUNT: 16.3 % (ref 12.3–15.4)
ERYTHROCYTE [DISTWIDTH] IN BLOOD BY AUTOMATED COUNT: 16.5 % (ref 12.3–15.4)
GFR SERPL CREATININE-BSD FRML MDRD: 81 ML/MIN/1.73
GFR SERPL CREATININE-BSD FRML MDRD: 81 ML/MIN/1.73
GLOBULIN UR ELPH-MCNC: 2.7 GM/DL (ref 2.5–3.8)
GLOBULIN UR ELPH-MCNC: 2.7 GM/DL (ref 2.5–3.8)
GLUCOSE BLD-MCNC: 127 MG/DL (ref 65–99)
GLUCOSE BLD-MCNC: 98 MG/DL (ref 65–99)
GLUCOSE UR STRIP-MCNC: NEGATIVE MG/DL
HCT VFR BLD AUTO: 35.6 % (ref 34–46.6)
HCT VFR BLD AUTO: 36 % (ref 34–46.6)
HGB BLD-MCNC: 11.7 G/DL (ref 12–15.9)
HGB BLD-MCNC: 11.9 G/DL (ref 12–15.9)
HGB UR QL STRIP.AUTO: NEGATIVE
KETONES UR QL STRIP: NEGATIVE
LEUKOCYTE ESTERASE UR QL STRIP.AUTO: NEGATIVE
LYMPHOCYTES # BLD AUTO: 0.5 10*3/MM3 (ref 0.7–3.1)
LYMPHOCYTES # BLD AUTO: 0.7 10*3/MM3 (ref 0.7–3.1)
LYMPHOCYTES NFR BLD AUTO: 4 % (ref 19.6–45.3)
LYMPHOCYTES NFR BLD AUTO: 5.5 % (ref 19.6–45.3)
MAGNESIUM SERPL-MCNC: 1.3 MG/DL (ref 1.8–2.5)
MCH RBC QN AUTO: 27.8 PG (ref 26.6–33)
MCH RBC QN AUTO: 28.6 PG (ref 26.6–33)
MCHC RBC AUTO-ENTMCNC: 32.4 G/DL (ref 31.5–35.7)
MCHC RBC AUTO-ENTMCNC: 33.5 G/DL (ref 31.5–35.7)
MCV RBC AUTO: 85.5 FL (ref 79–97)
MCV RBC AUTO: 85.6 FL (ref 79–97)
MONOCYTES # BLD AUTO: 1.3 10*3/MM3 (ref 0.1–0.9)
MONOCYTES # BLD AUTO: 1.3 10*3/MM3 (ref 0.1–0.9)
MONOCYTES NFR BLD AUTO: 10 % (ref 5–12)
MONOCYTES NFR BLD AUTO: 9.8 % (ref 5–12)
NEUTROPHILS # BLD AUTO: 11.2 10*3/MM3 (ref 1.7–7)
NEUTROPHILS # BLD AUTO: 11.5 10*3/MM3 (ref 1.7–7)
NEUTROPHILS NFR BLD AUTO: 83.5 % (ref 42.7–76)
NEUTROPHILS NFR BLD AUTO: 85.5 % (ref 42.7–76)
NITRITE UR QL STRIP: NEGATIVE
NRBC BLD AUTO-RTO: 0 /100 WBC (ref 0–0.2)
NRBC BLD AUTO-RTO: 0.1 /100 WBC (ref 0–0.2)
PH UR STRIP.AUTO: 6.5 [PH] (ref 5–8)
PLATELET # BLD AUTO: 191 10*3/MM3 (ref 140–450)
PLATELET # BLD AUTO: 193 10*3/MM3 (ref 140–450)
PMV BLD AUTO: 6.9 FL (ref 6–12)
PMV BLD AUTO: 7.2 FL (ref 6–12)
POTASSIUM BLD-SCNC: 2.9 MMOL/L (ref 3.6–5.1)
POTASSIUM BLD-SCNC: 3.1 MMOL/L (ref 3.6–5.1)
PROT SERPL-MCNC: 5.7 G/DL (ref 6.1–7.9)
PROT SERPL-MCNC: 5.9 G/DL (ref 6.1–7.9)
PROT UR QL STRIP: NEGATIVE
RBC # BLD AUTO: 4.16 10*6/MM3 (ref 3.77–5.28)
RBC # BLD AUTO: 4.21 10*6/MM3 (ref 3.77–5.28)
SODIUM BLD-SCNC: 133 MMOL/L (ref 136–144)
SODIUM BLD-SCNC: 136 MMOL/L (ref 136–144)
SP GR UR STRIP: 1.01 (ref 1–1.03)
UROBILINOGEN UR QL STRIP: NORMAL
WBC NRBC COR # BLD: 13.4 10*3/MM3 (ref 3.4–10.8)
WBC NRBC COR # BLD: 13.5 10*3/MM3 (ref 3.4–10.8)

## 2019-09-08 PROCEDURE — 71045 X-RAY EXAM CHEST 1 VIEW: CPT

## 2019-09-08 PROCEDURE — 25010000002 PROMETHAZINE PER 50 MG: Performed by: SURGERY

## 2019-09-08 PROCEDURE — 80053 COMPREHEN METABOLIC PANEL: CPT | Performed by: NURSE PRACTITIONER

## 2019-09-08 PROCEDURE — 25010000003 POTASSIUM CHLORIDE 10 MEQ/100ML SOLUTION: Performed by: SURGERY

## 2019-09-08 PROCEDURE — 85025 COMPLETE CBC W/AUTO DIFF WBC: CPT | Performed by: NURSE PRACTITIONER

## 2019-09-08 PROCEDURE — 81003 URINALYSIS AUTO W/O SCOPE: CPT | Performed by: NURSE PRACTITIONER

## 2019-09-08 PROCEDURE — 99024 POSTOP FOLLOW-UP VISIT: CPT | Performed by: SURGERY

## 2019-09-08 PROCEDURE — 25010000002 MAGNESIUM SULFATE 2 GM/50ML SOLUTION: Performed by: NURSE PRACTITIONER

## 2019-09-08 PROCEDURE — 94799 UNLISTED PULMONARY SVC/PX: CPT

## 2019-09-08 PROCEDURE — 25010000002 ONDANSETRON PER 1 MG: Performed by: SURGERY

## 2019-09-08 PROCEDURE — 80053 COMPREHEN METABOLIC PANEL: CPT | Performed by: SURGERY

## 2019-09-08 PROCEDURE — 25010000002 MORPHINE PER 10 MG: Performed by: SURGERY

## 2019-09-08 PROCEDURE — 83735 ASSAY OF MAGNESIUM: CPT | Performed by: NURSE PRACTITIONER

## 2019-09-08 PROCEDURE — 85025 COMPLETE CBC W/AUTO DIFF WBC: CPT | Performed by: SURGERY

## 2019-09-08 PROCEDURE — 25810000003 SODIUM CHLORIDE 0.9 % WITH KCL 20 MEQ 20-0.9 MEQ/L-% SOLUTION: Performed by: SURGERY

## 2019-09-08 RX ORDER — POTASSIUM CHLORIDE 7.45 MG/ML
10 INJECTION INTRAVENOUS
Status: DISCONTINUED | OUTPATIENT
Start: 2019-09-08 | End: 2019-09-11 | Stop reason: HOSPADM

## 2019-09-08 RX ORDER — POTASSIUM CHLORIDE 1.5 G/1.77G
40 POWDER, FOR SOLUTION ORAL AS NEEDED
Status: DISCONTINUED | OUTPATIENT
Start: 2019-09-08 | End: 2019-09-11 | Stop reason: HOSPADM

## 2019-09-08 RX ORDER — SODIUM CHLORIDE AND POTASSIUM CHLORIDE 150; 900 MG/100ML; MG/100ML
100 INJECTION, SOLUTION INTRAVENOUS CONTINUOUS
Status: DISCONTINUED | OUTPATIENT
Start: 2019-09-08 | End: 2019-09-11 | Stop reason: HOSPADM

## 2019-09-08 RX ORDER — MAGNESIUM SULFATE HEPTAHYDRATE 40 MG/ML
2 INJECTION, SOLUTION INTRAVENOUS ONCE
Status: COMPLETED | OUTPATIENT
Start: 2019-09-08 | End: 2019-09-08

## 2019-09-08 RX ORDER — POTASSIUM CHLORIDE 20 MEQ/1
40 TABLET, EXTENDED RELEASE ORAL AS NEEDED
Status: DISCONTINUED | OUTPATIENT
Start: 2019-09-08 | End: 2019-09-11 | Stop reason: HOSPADM

## 2019-09-08 RX ORDER — POTASSIUM CHLORIDE 20 MEQ/1
40 TABLET, EXTENDED RELEASE ORAL EVERY 4 HOURS
Status: DISCONTINUED | OUTPATIENT
Start: 2019-09-08 | End: 2019-09-08

## 2019-09-08 RX ADMIN — LEVOTHYROXINE SODIUM 100 MCG: 100 TABLET ORAL at 05:32

## 2019-09-08 RX ADMIN — ACETAMINOPHEN 1000 MG: 500 TABLET, FILM COATED ORAL at 10:16

## 2019-09-08 RX ADMIN — POTASSIUM CHLORIDE 10 MEQ: 10 INJECTION, SOLUTION INTRAVENOUS at 11:44

## 2019-09-08 RX ADMIN — MORPHINE SULFATE 6 MG: 4 INJECTION INTRAVENOUS at 00:16

## 2019-09-08 RX ADMIN — FAMOTIDINE 20 MG: 10 INJECTION, SOLUTION INTRAVENOUS at 21:11

## 2019-09-08 RX ADMIN — MAGNESIUM SULFATE HEPTAHYDRATE 2 G: 40 INJECTION, SOLUTION INTRAVENOUS at 14:20

## 2019-09-08 RX ADMIN — PROMETHAZINE HYDROCHLORIDE 12.5 MG: 25 INJECTION INTRAMUSCULAR; INTRAVENOUS at 09:24

## 2019-09-08 RX ADMIN — RIVAROXABAN 20 MG: 20 TABLET, FILM COATED ORAL at 17:08

## 2019-09-08 RX ADMIN — AMLODIPINE BESYLATE 10 MG: 5 TABLET ORAL at 10:16

## 2019-09-08 RX ADMIN — OXYCODONE HYDROCHLORIDE AND ACETAMINOPHEN 500 MG: 500 TABLET ORAL at 10:16

## 2019-09-08 RX ADMIN — ESCITALOPRAM OXALATE 10 MG: 10 TABLET ORAL at 10:16

## 2019-09-08 RX ADMIN — ONDANSETRON 4 MG: 2 INJECTION INTRAMUSCULAR; INTRAVENOUS at 05:24

## 2019-09-08 RX ADMIN — GABAPENTIN 100 MG: 100 CAPSULE ORAL at 17:08

## 2019-09-08 RX ADMIN — POTASSIUM CHLORIDE 10 MEQ: 10 INJECTION, SOLUTION INTRAVENOUS at 12:48

## 2019-09-08 RX ADMIN — POTASSIUM CHLORIDE 10 MEQ: 10 INJECTION, SOLUTION INTRAVENOUS at 22:35

## 2019-09-08 RX ADMIN — POTASSIUM CHLORIDE 10 MEQ: 10 INJECTION, SOLUTION INTRAVENOUS at 10:17

## 2019-09-08 RX ADMIN — METOPROLOL TARTRATE 100 MG: 50 TABLET, FILM COATED ORAL at 10:15

## 2019-09-08 RX ADMIN — POTASSIUM CHLORIDE 10 MEQ: 10 INJECTION, SOLUTION INTRAVENOUS at 23:41

## 2019-09-08 RX ADMIN — GABAPENTIN 100 MG: 100 CAPSULE ORAL at 21:12

## 2019-09-08 RX ADMIN — GABAPENTIN 100 MG: 100 CAPSULE ORAL at 14:31

## 2019-09-08 RX ADMIN — ONDANSETRON 4 MG: 2 INJECTION INTRAMUSCULAR; INTRAVENOUS at 07:26

## 2019-09-08 RX ADMIN — SODIUM CHLORIDE AND POTASSIUM CHLORIDE 100 ML/HR: 9; 1.49 INJECTION, SOLUTION INTRAVENOUS at 17:08

## 2019-09-08 RX ADMIN — CLONIDINE HYDROCHLORIDE 0.1 MG: 0.1 TABLET ORAL at 10:16

## 2019-09-08 RX ADMIN — MORPHINE SULFATE 4 MG: 4 INJECTION INTRAVENOUS at 22:30

## 2019-09-08 RX ADMIN — FAMOTIDINE 20 MG: 10 INJECTION, SOLUTION INTRAVENOUS at 08:06

## 2019-09-08 RX ADMIN — METOPROLOL TARTRATE 100 MG: 50 TABLET, FILM COATED ORAL at 21:10

## 2019-09-08 RX ADMIN — BUMETANIDE 2 MG: 1 TABLET ORAL at 10:15

## 2019-09-08 RX ADMIN — POTASSIUM CHLORIDE 10 MEQ: 10 INJECTION, SOLUTION INTRAVENOUS at 08:06

## 2019-09-08 RX ADMIN — FAMOTIDINE 20 MG: 10 INJECTION, SOLUTION INTRAVENOUS at 22:32

## 2019-09-08 RX ADMIN — CLONIDINE HYDROCHLORIDE 0.1 MG: 0.1 TABLET ORAL at 21:09

## 2019-09-08 RX ADMIN — GABAPENTIN 100 MG: 100 CAPSULE ORAL at 10:16

## 2019-09-08 NOTE — THERAPY TREATMENT NOTE
Pt was seen up in [place] ambulating with nsg staff.   Pt uses a rollator at home and is doing well with mobility with nsg supervision. No skilled PT needs at this time.

## 2019-09-08 NOTE — PROGRESS NOTES
She had vomiting last night and has been having nausea    Vitals:    09/07/19 1500 09/07/19 2223 09/08/19 0607 09/08/19 1356   BP: 159/96 166/69 139/81 132/79   BP Location: Left arm   Left arm   Patient Position: Lying   Sitting   Pulse: 72 77 82 71   Resp: 16 20 20 16   Temp: 97.5 °F (36.4 °C) 98.8 °F (37.1 °C) 98.8 °F (37.1 °C) 98.2 °F (36.8 °C)   TempSrc: Oral   Oral   SpO2: 96% 95% 94% 94%   Weight:       Height:             Lungs clear  Incision no erythema abdomen is nontender      Assessment and plan    Postoperative day 3 right hemicolectomy for cancer    -Had some emesis last night and today.  I told her to back off on her oral intake and try to just stay hydrated for now  -We will restart fluids and replace potassium

## 2019-09-08 NOTE — PLAN OF CARE
Problem: Patient Care Overview  Goal: Plan of Care Review  Outcome: Ongoing (interventions implemented as appropriate)    Goal: Individualization and Mutuality  Outcome: Ongoing (interventions implemented as appropriate)    Goal: Discharge Needs Assessment  Outcome: Ongoing (interventions implemented as appropriate)    Goal: Interprofessional Rounds/Family Conf  Outcome: Ongoing (interventions implemented as appropriate)      Problem: Fall Risk (Adult)  Goal: Identify Related Risk Factors and Signs and Symptoms  Outcome: Ongoing (interventions implemented as appropriate)    Goal: Absence of Fall  Outcome: Ongoing (interventions implemented as appropriate)      Problem: Pain, Acute (Adult)  Goal: Identify Related Risk Factors and Signs and Symptoms  Outcome: Ongoing (interventions implemented as appropriate)    Goal: Acceptable Pain Control/Comfort Level  Outcome: Ongoing (interventions implemented as appropriate)      Problem: Skin Injury Risk (Adult)  Goal: Identify Related Risk Factors and Signs and Symptoms  Outcome: Ongoing (interventions implemented as appropriate)

## 2019-09-08 NOTE — CONSULTS
Consults    Patient Care Team:  Alex Rodriguez MD as PCP - General  Alex Rodriguez MD as PCP - Claims Attributed  Alex Rodriguez MD as PCP - Family Medicine    Chief complaint: s/p right hemicolectomy for cancer    Subjective     Has had some vomiting and still c/o nausea. Very tired after phenergan. Receiving potassium replacement. Still has cough. Denies SOA or pain.         Review of Systems   Constitutional: Positive for fatigue. Negative for chills and fever.   Respiratory: Positive for cough. Negative for shortness of breath.    Cardiovascular: Negative for chest pain and palpitations.   Gastrointestinal: Positive for nausea and vomiting. Negative for constipation.   Musculoskeletal: Negative for back pain.   Neurological: Negative for dizziness.        Past Medical History:   Diagnosis Date   • Anemia 2018   • Anxiety 2018   • Arthritis    • Atrial fibrillation (CMS/HCC)    • CAD (coronary artery disease) 2013   • CKD (chronic kidney disease) 2019   • Colon cancer (CMS/HCC)    • GERD (gastroesophageal reflux disease) 2009   • Gout 2009   • High cholesterol 1989   • Hypertension 1989   • Hypothyroidism 2004   • Lupus    • PHOENIX (obstructive sleep apnea)    • Osteopenia 2015   • Osteoporosis 2004   • PONV (postoperative nausea and vomiting)    • Rheumatoid arthritis (CMS/HCC) 2009   ,   Past Surgical History:   Procedure Laterality Date   • CARDIAC CATHETERIZATION      no stents   • CHOLECYSTECTOMY  1990    laparoscopic    • COLON RESECTION Right 9/5/2019    Procedure: Open right hemicolectomy;  Surgeon: Dominick Cunha DO;  Location: [facility];  Service: General   • COLONOSCOPY     • HEEL SPUR EXCISION Right 1994   • HYSTERECTOMY  1994    total due to heavy beeding and fibroids   • JOINT REPLACEMENT     • TONSILLECTOMY  1962   • TOTAL HIP ARTHROPLASTY Right 2010   ,   Family History   Problem Relation Age of Onset   • Colon cancer Brother 71   ,   Social History     Tobacco Use   • Smoking status:  Never Smoker   • Smokeless tobacco: Never Used   Substance Use Topics   • Alcohol use: No     Frequency: Never   • Drug use: No   ,   Medications Prior to Admission   Medication Sig Dispense Refill Last Dose   • Acetaminophen (TYLENOL 8 HOUR ARTHRITIS PAIN PO) Take 650 mg by mouth 2 (Two) Times a Day.   Past Week at Unknown time   • amLODIPine (NORVASC) 10 MG tablet Daily.   2019 at Unknown time   • b complex vitamins tablet B COMPLEX TABS   Past Week at Unknown time   • bumetanide (BUMEX) 2 MG tablet Take 2 mg by mouth Daily.   2019 at Unknown time   • Calcium Carbonate-Vitamin D (CALCIUM 600+D) 600-200 MG-UNIT tablet Take 1,200 mg by mouth Daily.   Past Week at Unknown time   • Cholecalciferol (VITAMIN D3) 2000 units capsule VITAMIN D3 2000 UNIT CAPS   Past Week at Unknown time   • CloNIDine (CATAPRES) 0.1 MG tablet TAKE 1 TABLET TWICE A DAY   2019 at Unknown time   • Cranberry 125 MG tablet Take 500 mg by mouth.   Past Week at Unknown time   • cyanocobalamin (VITAMIN B-12) 2500 MCG tablet tablet 1000 mcg   Past Week at Unknown time   • [] erythromycin base (E-MYCIN) 250 MG tablet Take 250 mg by mouth 4 (Four) Times a Day.      • escitalopram (LEXAPRO) 10 MG tablet Take 10 mg by mouth Daily.   2019 at Unknown time   • ferrous sulfate 325 (65 FE) MG tablet TAKE 1 TABLET BY MOUTh DAILY WITH MEALS   Past Week at Unknown time   • gabapentin (NEURONTIN) 100 MG capsule Take 1 capsule by mouth 5 (Five) Times a Day. 450 capsule 0 2019 at Unknown time   • Hyaluronan (ORTHOVISC) 30 MG/2ML solution prefilled syringe injection Inject 2 mL into the appropriate joint as directed by provider.   Past Month at Unknown time   • hydroxychloroquine (PLAQUENIL) 200 MG tablet 2 (Two) Times a Day.   2019 at Unknown time   • levothyroxine (SYNTHROID, LEVOTHROID) 100 MCG tablet Take 100 mcg by mouth Daily.   2019 at Unknown time   • metoprolol tartrate (LOPRESSOR) 100 MG tablet TAKE 1 TABLET TWICE A DAY    2019 at Unknown time   • [] neomycin (MYCIFRADIN) 500 MG tablet Take 500 mg by mouth 4 (Four) Times a Day.      • omeprazole (priLOSEC) 40 MG capsule Daily.   2019 at Unknown time   • potassium chloride (K-DUR) 10 MEQ CR tablet 10 mEq Daily.   2019 at Unknown time   • pravastatin (PRAVACHOL) 40 MG tablet Take 40 mg by mouth Daily.   Past Week at Unknown time   • ULORIC 40 MG tablet Daily.   2019 at Unknown time   • valsartan (DIOVAN) 160 MG tablet 160 mg Daily.   2019 at Unknown time   • vitamin C (ASCORBIC ACID) 500 MG tablet Take 500 mg by mouth Daily.   Past Week at Unknown time   • acetaminophen (TYLENOL) 500 MG tablet Take 2 tablets by mouth Daily.   Not Taking   • aspirin 81 MG tablet Take 81 mg by mouth Daily.   2019   • denosumab (PROLIA) 60 MG/ML solution prefilled syringe syringe PROLIA 60 MG/ML SOSY   More than a month at Unknown time   • ondansetron (ZOFRAN) 4 MG tablet Take 1 tablet by mouth As Needed for Nausea or Vomiting. 20 tablet 0    • SYNTHROID 100 MCG tablet 100 mcg Daily.   Taking   • XARELTO 20 MG tablet    2019   , Scheduled Meds:      acetaminophen 1,000 mg Oral Daily   amLODIPine 10 mg Oral Daily   bumetanide 2 mg Oral Daily   CloNIDine 0.1 mg Oral BID   escitalopram 10 mg Oral Daily   famotidine 20 mg Intravenous BID   febuxostat 40 mg Oral Daily   gabapentin 100 mg Oral 5x Daily   levothyroxine 100 mcg Oral Daily   losartan 25 mg Oral Q24H   magnesium sulfate 2 g Intravenous Once   metoprolol tartrate 100 mg Oral BID   rivaroxaban 20 mg Oral Daily With Dinner   vitamin C 500 mg Oral Daily   , Continuous Infusions:      sodium chloride 9 mL/hr Last Rate: 9 mL/hr (19 1104)   , PRN Meds:  HYDROcodone-acetaminophen  •  meperidine  •  Morphine **AND** naloxone  •  Morphine **AND** naloxone  •  ondansetron **OR** ondansetron  •  potassium chloride **OR** potassium chloride **OR** potassium chloride  •  promethazine  •  sodium chloride and Allergies:   Codeine and Hydrocodone-acetaminophen    Objective      Vital Signs  Temp:  [97.5 °F (36.4 °C)-98.8 °F (37.1 °C)] 98.8 °F (37.1 °C)  Heart Rate:  [72-82] 82  Resp:  [16-20] 20  BP: (139-166)/(69-96) 139/81    Physical Exam   Constitutional: She is oriented to person, place, and time. She appears well-developed and well-nourished.   HENT:   Head: Normocephalic and atraumatic.   Neck: Normal range of motion. Neck supple.   Cardiovascular: Normal rate, regular rhythm and normal heart sounds.   Pulmonary/Chest: Effort normal and breath sounds normal.   Abdominal: Bowel sounds are normal. There is no tenderness.   Neurological: She is alert and oriented to person, place, and time.   Skin: Skin is warm and dry. There is pallor.   Psychiatric: She has a normal mood and affect. Her behavior is normal. Judgment and thought content normal.       Results Review:   Lab Results (last 24 hours)     Procedure Component Value Units Date/Time    Comprehensive Metabolic Panel [[id number]]  (Abnormal) Collected:  09/08/19 0547    Specimen:  Blood Updated:  09/08/19 0631     Glucose 127 mg/dL      BUN 7 mg/dL      Creatinine 0.70 mg/dL      Sodium 136 mmol/L      Potassium 2.9 mmol/L      Chloride 100 mmol/L      CO2 24.0 mmol/L      Calcium 8.0 mg/dL      Total Protein 5.9 g/dL      Albumin 3.20 g/dL      ALT (SGPT) 24 U/L      AST (SGOT) 23 U/L      Alkaline Phosphatase 71 U/L      Total Bilirubin 1.1 mg/dL      eGFR Non African Amer 81 mL/min/1.73      Globulin 2.7 gm/dL      A/G Ratio 1.2 g/dL      BUN/Creatinine Ratio 10.0     Anion Gap 14.9 mmol/L     Narrative:       The MDRD GFR formula is only valid for adults with stable renal function between ages 18 and 70.    Magnesium [[id number]]  (Abnormal) Collected:  09/08/19 0547    Specimen:  Blood Updated:  09/08/19 0631     Magnesium 1.3 mg/dL     CBC & Differential [[id number]] Collected:  09/08/19 0547    Specimen:  Blood Updated:  09/08/19 0610    Narrative:       The following  orders were created for panel order CBC & Differential.  Procedure                               Abnormality         Status                     ---------                               -----------         ------                     CBC Auto Differential[[id number]]        Abnormal            Final result                 Please view results for these tests on the individual orders.    CBC Auto Differential [[id number]]  (Abnormal) Collected:  09/08/19 0547    Specimen:  Blood Updated:  09/08/19 0610     WBC 13.50 10*3/mm3      RBC 4.16 10*6/mm3      Hemoglobin 11.9 g/dL      Hematocrit 35.6 %      MCV 85.5 fL      MCH 28.6 pg      MCHC 33.5 g/dL      RDW 16.3 %      RDW-SD 49.9 fl      MPV 6.9 fL      Platelets 191 10*3/mm3      Neutrophil % 85.5 %      Lymphocyte % 4.0 %      Monocyte % 9.8 %      Eosinophil % 0.6 %      Basophil % 0.1 %      Neutrophils, Absolute 11.50 10*3/mm3      Lymphocytes, Absolute 0.50 10*3/mm3      Monocytes, Absolute 1.30 10*3/mm3      Eosinophils, Absolute 0.10 10*3/mm3      Basophils, Absolute 0.00 10*3/mm3      nRBC 0.1 /100 WBC          Imaging Results (last 24 hours)     ** No results found for the last 24 hours. **           I reviewed the patient's new clinical results.        Assessment/Plan       Malignant neoplasm of ascending colon (CMS/HCC)    Atrial fibrillation (CMS/HCC)    Hypertension    Anemia    CKD (chronic kidney disease)    Carcinoma of ascending colon (CMS/HCC)      Assessment:    Condition: In stable condition.  Unchanged.       Plan:   (Replace potassium and magnesium today. Check portable CXR and clean catch urine to further evaluate cough, nausea, and elevated WBC. ).       I discussed the patients findings and my recommendations with patient, family and primary care team    Becki Roberts, PAIGE  09/08/19  11:55 AM

## 2019-09-08 NOTE — PLAN OF CARE
Problem: Patient Care Overview  Goal: Individualization and Mutuality  Outcome: Ongoing (interventions implemented as appropriate)    Goal: Interprofessional Rounds/Family Conf  Outcome: Ongoing (interventions implemented as appropriate)      Problem: Surgery Nonspecified (Adult)  Goal: Anesthesia/Sedation Recovery  Outcome: Outcome(s) achieved Date Met: 09/08/19      Problem: Skin Injury Risk (Adult)  Goal: Identify Related Risk Factors and Signs and Symptoms  Outcome: Ongoing (interventions implemented as appropriate)    Goal: Skin Health and Integrity  Outcome: Ongoing (interventions implemented as appropriate)

## 2019-09-09 LAB
ALBUMIN SERPL-MCNC: 2.8 G/DL (ref 3.5–4.8)
ALBUMIN/GLOB SERPL: 1.1 G/DL (ref 1–1.7)
ALP SERPL-CCNC: 98 U/L (ref 32–91)
ALT SERPL W P-5'-P-CCNC: 25 U/L (ref 14–54)
ANION GAP SERPL CALCULATED.3IONS-SCNC: 13.4 MMOL/L (ref 5–15)
AST SERPL-CCNC: 21 U/L (ref 15–41)
BASOPHILS # BLD AUTO: 0 10*3/MM3 (ref 0–0.2)
BASOPHILS NFR BLD AUTO: 0.2 % (ref 0–1.5)
BILIRUB SERPL-MCNC: 1.3 MG/DL (ref 0.3–1.2)
BUN BLD-MCNC: 6 MG/DL (ref 8–20)
BUN/CREAT SERPL: 8.6 (ref 5.4–26.2)
CALCIUM SPEC-SCNC: 7.8 MG/DL (ref 8.9–10.3)
CHLORIDE SERPL-SCNC: 101 MMOL/L (ref 101–111)
CO2 SERPL-SCNC: 24 MMOL/L (ref 22–32)
CREAT BLD-MCNC: 0.7 MG/DL (ref 0.4–1)
DEPRECATED RDW RBC AUTO: 49 FL (ref 37–54)
EOSINOPHIL # BLD AUTO: 0.2 10*3/MM3 (ref 0–0.4)
EOSINOPHIL NFR BLD AUTO: 1.6 % (ref 0.3–6.2)
ERYTHROCYTE [DISTWIDTH] IN BLOOD BY AUTOMATED COUNT: 16.1 % (ref 12.3–15.4)
GFR SERPL CREATININE-BSD FRML MDRD: 81 ML/MIN/1.73
GLOBULIN UR ELPH-MCNC: 2.6 GM/DL (ref 2.5–3.8)
GLUCOSE BLD-MCNC: 92 MG/DL (ref 65–99)
HCT VFR BLD AUTO: 34.2 % (ref 34–46.6)
HGB BLD-MCNC: 11.3 G/DL (ref 12–15.9)
LAB AP CASE REPORT: NORMAL
LAB AP SYNOPTIC CHECKLIST: NORMAL
LYMPHOCYTES # BLD AUTO: 0.7 10*3/MM3 (ref 0.7–3.1)
LYMPHOCYTES NFR BLD AUTO: 5.5 % (ref 19.6–45.3)
MAGNESIUM SERPL-MCNC: 1.7 MG/DL (ref 1.8–2.5)
MCH RBC QN AUTO: 28.4 PG (ref 26.6–33)
MCHC RBC AUTO-ENTMCNC: 33.1 G/DL (ref 31.5–35.7)
MCV RBC AUTO: 85.8 FL (ref 79–97)
MONOCYTES # BLD AUTO: 1.3 10*3/MM3 (ref 0.1–0.9)
MONOCYTES NFR BLD AUTO: 9.7 % (ref 5–12)
NEUTROPHILS # BLD AUTO: 10.7 10*3/MM3 (ref 1.7–7)
NEUTROPHILS NFR BLD AUTO: 83 % (ref 42.7–76)
NRBC BLD AUTO-RTO: 0.1 /100 WBC (ref 0–0.2)
PATH REPORT.FINAL DX SPEC: NORMAL
PATH REPORT.GROSS SPEC: NORMAL
PLATELET # BLD AUTO: 175 10*3/MM3 (ref 140–450)
PMV BLD AUTO: 7 FL (ref 6–12)
POTASSIUM BLD-SCNC: 3.4 MMOL/L (ref 3.6–5.1)
POTASSIUM BLD-SCNC: 3.9 MMOL/L (ref 3.6–5.1)
PROT SERPL-MCNC: 5.4 G/DL (ref 6.1–7.9)
RBC # BLD AUTO: 3.98 10*6/MM3 (ref 3.77–5.28)
SODIUM BLD-SCNC: 135 MMOL/L (ref 136–144)
WBC NRBC COR # BLD: 12.9 10*3/MM3 (ref 3.4–10.8)

## 2019-09-09 PROCEDURE — 85025 COMPLETE CBC W/AUTO DIFF WBC: CPT | Performed by: SURGERY

## 2019-09-09 PROCEDURE — 83735 ASSAY OF MAGNESIUM: CPT | Performed by: NURSE PRACTITIONER

## 2019-09-09 PROCEDURE — 25010000002 MORPHINE PER 10 MG: Performed by: SURGERY

## 2019-09-09 PROCEDURE — 25810000003 SODIUM CHLORIDE 0.9 % WITH KCL 20 MEQ 20-0.9 MEQ/L-% SOLUTION: Performed by: SURGERY

## 2019-09-09 PROCEDURE — 84132 ASSAY OF SERUM POTASSIUM: CPT | Performed by: FAMILY MEDICINE

## 2019-09-09 PROCEDURE — 25010000002 PROMETHAZINE PER 50 MG: Performed by: SURGERY

## 2019-09-09 PROCEDURE — 25010000003 POTASSIUM CHLORIDE 10 MEQ/100ML SOLUTION: Performed by: SURGERY

## 2019-09-09 PROCEDURE — 25010000002 ONDANSETRON PER 1 MG: Performed by: SURGERY

## 2019-09-09 PROCEDURE — 80053 COMPREHEN METABOLIC PANEL: CPT | Performed by: SURGERY

## 2019-09-09 RX ORDER — GABAPENTIN 300 MG/1
300 CAPSULE ORAL NIGHTLY
Status: DISCONTINUED | OUTPATIENT
Start: 2019-09-09 | End: 2019-09-11 | Stop reason: HOSPADM

## 2019-09-09 RX ORDER — GABAPENTIN 100 MG/1
200 CAPSULE ORAL DAILY
Status: DISCONTINUED | OUTPATIENT
Start: 2019-09-10 | End: 2019-09-11 | Stop reason: HOSPADM

## 2019-09-09 RX ORDER — GABAPENTIN 300 MG/1
300 CAPSULE ORAL NIGHTLY
COMMUNITY
End: 2021-07-19

## 2019-09-09 RX ORDER — GABAPENTIN 100 MG/1
200 CAPSULE ORAL EVERY MORNING
COMMUNITY
End: 2021-07-19

## 2019-09-09 RX ADMIN — GABAPENTIN 100 MG: 100 CAPSULE ORAL at 12:23

## 2019-09-09 RX ADMIN — LEVOTHYROXINE SODIUM 100 MCG: 100 TABLET ORAL at 05:36

## 2019-09-09 RX ADMIN — FEBUXOSTAT 40 MG: 40 TABLET ORAL at 09:31

## 2019-09-09 RX ADMIN — METOPROLOL TARTRATE 100 MG: 50 TABLET, FILM COATED ORAL at 20:48

## 2019-09-09 RX ADMIN — ACETAMINOPHEN 1000 MG: 500 TABLET, FILM COATED ORAL at 09:31

## 2019-09-09 RX ADMIN — METOPROLOL TARTRATE 100 MG: 50 TABLET, FILM COATED ORAL at 09:30

## 2019-09-09 RX ADMIN — POTASSIUM CHLORIDE 10 MEQ: 10 INJECTION, SOLUTION INTRAVENOUS at 02:02

## 2019-09-09 RX ADMIN — POTASSIUM CHLORIDE 10 MEQ: 10 INJECTION, SOLUTION INTRAVENOUS at 03:11

## 2019-09-09 RX ADMIN — CLONIDINE HYDROCHLORIDE 0.1 MG: 0.1 TABLET ORAL at 09:42

## 2019-09-09 RX ADMIN — POTASSIUM CHLORIDE 10 MEQ: 10 INJECTION, SOLUTION INTRAVENOUS at 05:34

## 2019-09-09 RX ADMIN — POTASSIUM CHLORIDE 10 MEQ: 10 INJECTION, SOLUTION INTRAVENOUS at 04:18

## 2019-09-09 RX ADMIN — CLONIDINE HYDROCHLORIDE 0.1 MG: 0.1 TABLET ORAL at 20:48

## 2019-09-09 RX ADMIN — AMLODIPINE BESYLATE 10 MG: 5 TABLET ORAL at 09:30

## 2019-09-09 RX ADMIN — FAMOTIDINE 20 MG: 10 INJECTION, SOLUTION INTRAVENOUS at 20:48

## 2019-09-09 RX ADMIN — ESCITALOPRAM OXALATE 10 MG: 10 TABLET ORAL at 09:31

## 2019-09-09 RX ADMIN — MORPHINE SULFATE 4 MG: 4 INJECTION INTRAVENOUS at 02:06

## 2019-09-09 RX ADMIN — OXYCODONE HYDROCHLORIDE AND ACETAMINOPHEN 500 MG: 500 TABLET ORAL at 09:42

## 2019-09-09 RX ADMIN — GABAPENTIN 100 MG: 100 CAPSULE ORAL at 07:42

## 2019-09-09 RX ADMIN — LOSARTAN POTASSIUM 25 MG: 25 TABLET, FILM COATED ORAL at 09:31

## 2019-09-09 RX ADMIN — ONDANSETRON 4 MG: 2 INJECTION INTRAMUSCULAR; INTRAVENOUS at 20:48

## 2019-09-09 RX ADMIN — PROMETHAZINE HYDROCHLORIDE 12.5 MG: 25 INJECTION INTRAMUSCULAR; INTRAVENOUS at 23:04

## 2019-09-09 RX ADMIN — SODIUM CHLORIDE AND POTASSIUM CHLORIDE 100 ML/HR: 9; 1.49 INJECTION, SOLUTION INTRAVENOUS at 13:56

## 2019-09-09 RX ADMIN — RIVAROXABAN 20 MG: 20 TABLET, FILM COATED ORAL at 18:18

## 2019-09-09 RX ADMIN — BUMETANIDE 2 MG: 1 TABLET ORAL at 09:42

## 2019-09-09 RX ADMIN — FAMOTIDINE 20 MG: 10 INJECTION, SOLUTION INTRAVENOUS at 09:31

## 2019-09-09 RX ADMIN — GABAPENTIN 300 MG: 300 CAPSULE ORAL at 20:48

## 2019-09-09 NOTE — DISCHARGE PLACEMENT REQUEST
"Yasmeen Rahman (79 y.o. Female)     Date of Birth Social Security Number Address Home Phone MRN    1940  [id number] [place] [address] [phone] [id number]    [employer]oration[employer] Marital Status          [place] Am[employer]        Admission Date Admission Type Admitting Provider Attending Provider Department, Room/Bed    9/5/19 Elective Dominick Cunha, Dominick Price,  [facility], 4118/1    Discharge Date Discharge Disposition Discharge Destination                       Attending Provider:  Dominick Cunha DO    Allergies:  Codeine, Hydrocodone-acetaminophen    Isolation:  None   Infection:  None   Code Status:  CPR    Ht:  160 cm (63\")   Wt:  123 kg (271 lb 9.7 oz)    Admission Cmt:  None   Principal Problem:  Malignant neoplasm of ascending colon (CMS/HCC) [C18.2] More...                 Active Insurance as of 9/5/2019     Primary Coverage     Payor Plan Insurance Group Employer/Plan Group    MEDICARE MEDICARE A & B      Payor Plan Address Payor Plan Phone Number Payor Plan Fax Number Effective Dates    [address] [phone]  7/1/2006 - None Entered    [facility] [address]       Subscriber Name Subscriber Birth Date Member ID       YASMEEN RAHMAN 1940 [id number]           Secondary Coverage     Payor Plan Insurance Group Employer/Plan Group    ANTHEM BLUE CROSS ANTHEM BLUE CROSS BLUE SHIELD PPO [id number]     Payor Plan Address Payor Plan Phone Number Payor Plan Fax Number Effective Dates    [address] [phone]  1/1/2019 - None Entered    [facility] [id number]       Subscriber Name Subscriber Birth Date Member ID       NICOLA RAHMAN 10/16/1935 [id number]                 Emergency Contacts      (Rel.) Home Phone Work Phone Mobile Phone    SAMARA RAHMAN (Daughter) [phone] -- [phone]    NICOLA RAHMAN (Spouse) [phone] -- --              "

## 2019-09-09 NOTE — PLAN OF CARE
Problem: Patient Care Overview  Goal: Plan of Care Review  Outcome: Outcome(s) achieved Date Met: 09/09/19 09/09/19 0216   Coping/Psychosocial   Plan of Care Reviewed With patient   Plan of Care Review   Progress no change   OTHER   Outcome Summary pts. Daughter is at bedside. Pt. potassium tonight was 3.3. So potassium protocal was started. pt. has been on IVPB potassium. We will recheck in todau after all the runs have been infused.

## 2019-09-09 NOTE — PROGRESS NOTES
Continued Stay Note  [facility]     Patient Name: Yasmeen Rahman  MRN: [id number]  Today's Date: 9/9/2019    Admit Date: 9/5/2019    Discharge Plan     Row Name 09/09/19 1345       Plan    Plan  [facility] home care    Patient/Family in Agreement with Plan  yes    Plan Comments  spoke to patient and daughter at bedside. PT is following patient. Referral made to jasper at [facility] home care and orders received. Patient will dc home when able to tolerate po            Amber Brown RN

## 2019-09-09 NOTE — PLAN OF CARE
Problem: Patient Care Overview  Goal: Individualization and Mutuality  Outcome: Ongoing (interventions implemented as appropriate)    Goal: Discharge Needs Assessment  Outcome: Ongoing (interventions implemented as appropriate)    Goal: Interprofessional Rounds/Family Conf  Outcome: Ongoing (interventions implemented as appropriate)      Problem: Fall Risk (Adult)  Goal: Identify Related Risk Factors and Signs and Symptoms  Outcome: Ongoing (interventions implemented as appropriate)    Goal: Absence of Fall  Outcome: Ongoing (interventions implemented as appropriate)      Problem: Pain, Acute (Adult)  Goal: Identify Related Risk Factors and Signs and Symptoms  Outcome: Ongoing (interventions implemented as appropriate)    Goal: Acceptable Pain Control/Comfort Level  Outcome: Outcome(s) achieved Date Met: 09/09/19      Problem: Skin Injury Risk (Adult)  Goal: Identify Related Risk Factors and Signs and Symptoms  Outcome: Ongoing (interventions implemented as appropriate)    Goal: Skin Health and Integrity  Outcome: Ongoing (interventions implemented as appropriate)

## 2019-09-09 NOTE — PROGRESS NOTES
LOS: 4 days   Patient Care Team:  Alex Rodriguez MD as PCP - General  Alex Rodriguez MD as PCP - Claims Attributed  Alex Rodriguez MD as PCP - Family Medicine    Subjective:  RUQ abdominl pain//nausea  Objective:   afebrile      Review of Systems:   Review of Systems   Constitutional: Positive for fatigue.   HENT: Negative.    Respiratory: Negative for shortness of breath.    Gastrointestinal: Positive for abdominal distention, abdominal pain and nausea.   Genitourinary: Negative.    Musculoskeletal: Negative.    Skin: Negative.    Neurological: Negative.            Vital Signs  Temp:  [98.2 °F (36.8 °C)-98.5 °F (36.9 °C)] 98.5 °F (36.9 °C)  Heart Rate:  [65-71] 66  Resp:  [16] 16  BP: (132-181)/(78-79) 145/78    Physical Exam:  Physical Exam   Constitutional: She is oriented to person, place, and time. She appears well-developed and well-nourished.   HENT:   Head: Normocephalic and atraumatic.   Eyes: EOM are normal. Pupils are equal, round, and reactive to light.   Neck: Normal range of motion.   Cardiovascular: Regular rhythm.   Pulmonary/Chest: Effort normal and breath sounds normal.   Abdominal: Soft. Bowel sounds are normal.   Neurological: She is alert and oriented to person, place, and time.   Skin: Skin is warm.   Psychiatric: She has a normal mood and affect. Her behavior is normal.   Nursing note and vitals reviewed.       Radiology:  Xr Chest 1 View    Result Date: 9/8/2019  Mild pulmonary edema pattern with small bilateral pleural effusions. Stable cardiomegaly.  Electronically Signed By-Piedad Bui On:9/8/2019 12:29 PM This report was finalized on [id number] by  Piedad Bui, .         Results Review:     I reviewed the patient's new clinical results.  I reviewed the patient's new imaging results and agree with the interpretation.    Medication Review:   Scheduled Meds:  acetaminophen 1,000 mg Oral Daily   amLODIPine 10 mg Oral Daily   bumetanide 2 mg Oral Daily   CloNIDine 0.1 mg Oral  BID   escitalopram 10 mg Oral Daily   famotidine 20 mg Intravenous BID   febuxostat 40 mg Oral Daily   gabapentin 100 mg Oral 5x Daily   levothyroxine 100 mcg Oral Daily   losartan 25 mg Oral Q24H   metoprolol tartrate 100 mg Oral BID   rivaroxaban 20 mg Oral Daily With Dinner   vitamin C 500 mg Oral Daily     Continuous Infusions:  sodium chloride 9 mL/hr Last Rate: 9 mL/hr (09/05/19 1104)   sodium chloride 0.9 % with KCl 20 mEq 100 mL/hr Last Rate: 100 mL/hr (09/08/19 1708)     PRN Meds:.HYDROcodone-acetaminophen  •  Morphine **AND** naloxone  •  Morphine **AND** naloxone  •  ondansetron **OR** ondansetron  •  potassium chloride **OR** potassium chloride **OR** potassium chloride  •  promethazine  •  sodium chloride    Labs:    CBC    Results from last 7 days   Lab Units 09/09/19  0249 09/08/19  1629 09/08/19  0547 09/06/19  0311   WBC 10*3/mm3 12.90* 13.40* 13.50* 10.50   HEMOGLOBIN g/dL 11.3* 11.7* 11.9* 12.6   PLATELETS 10*3/mm3 175 193 191 195     BMP Results from last 7 days   Lab Units 09/09/19  0249 09/08/19  1630 09/08/19  0547 09/06/19  0311   SODIUM mmol/L 135* 133* 136 134*   POTASSIUM mmol/L 3.4* 3.1* 2.9* 3.6   CHLORIDE mmol/L 101 98* 100* 102   CO2 mmol/L 24.0 23.0 24.0 21.0*   BUN mg/dL 6* 6* 7* 6*   CREATININE mg/dL 0.70 0.70 0.70 0.80   GLUCOSE mg/dL 92 98 127* 163*   MAGNESIUM mg/dL 1.7*  --  1.3* 1.5*   PHOSPHORUS mg/dL  --   --   --  4.0     Cr Clearance Estimated Creatinine Clearance: 73.7 mL/min (by C-G formula based on SCr of 0.7 mg/dL).  Coag     HbA1C No results found for: HGBA1C  Blood Glucose No results found for: POCGLU  Infection     CMP Results from last 7 days   Lab Units 09/09/19  0249 09/08/19  1630 09/08/19  0547 09/06/19  0311   SODIUM mmol/L 135* 133* 136 134*   POTASSIUM mmol/L 3.4* 3.1* 2.9* 3.6   CHLORIDE mmol/L 101 98* 100* 102   CO2 mmol/L 24.0 23.0 24.0 21.0*   BUN mg/dL 6* 6* 7* 6*   CREATININE mg/dL 0.70 0.70 0.70 0.80   GLUCOSE mg/dL 92 98 127* 163*   ALBUMIN g/dL 2.80*  3.00* 3.20* 3.30*   BILIRUBIN mg/dL 1.3* 1.1 1.1 1.2   ALK PHOS U/L 98* 71 71 71   AST (SGOT) U/L 21 18 23 27   ALT (SGPT) U/L 25 24 24 17     UA  Results from last 7 days   Lab Units 09/08/19  1432   NITRITE UA  Negative     Radiology(recent) Xr Chest 1 View    Result Date: 9/8/2019  Mild pulmonary edema pattern with small bilateral pleural effusions. Stable cardiomegaly.  Electronically Signed By-Piedad Bui On:9/8/2019 12:29 PM This report was finalized on [id number] by  Piedad Bui, .     Assessment:      Malignant neoplasm of ascending colon (CMS/HCC)    Atrial fibrillation (CMS/HCC)    Hypertension    Anemia    CKD (chronic kidney disease)    Carcinoma of ascending colon (CMS/HCC)  Plan:  Post-op pathway        lAex Rodriguez MD  09/09/19  7:03 AM

## 2019-09-10 PROBLEM — M1A.9XX0 CHRONIC GOUT: Status: ACTIVE | Noted: 2017-02-15

## 2019-09-10 PROBLEM — M54.50 LOWER BACK PAIN: Status: ACTIVE | Noted: 2017-02-15

## 2019-09-10 PROBLEM — E66.9 OBESITY: Status: ACTIVE | Noted: 2019-09-10

## 2019-09-10 PROBLEM — M51.369 DDD (DEGENERATIVE DISC DISEASE), LUMBAR: Status: ACTIVE | Noted: 2019-09-10

## 2019-09-10 PROBLEM — D50.9 IRON DEFICIENCY ANEMIA: Status: ACTIVE | Noted: 2018-01-24

## 2019-09-10 PROBLEM — M79.7 FIBROMYOSITIS: Status: ACTIVE | Noted: 2018-01-11

## 2019-09-10 PROBLEM — M81.0 OSTEOPOROSIS: Status: ACTIVE | Noted: 2017-09-06

## 2019-09-10 PROBLEM — M51.36 DDD (DEGENERATIVE DISC DISEASE), LUMBAR: Status: ACTIVE | Noted: 2019-09-10

## 2019-09-10 PROBLEM — M19.90 INFLAMMATORY ARTHRITIS: Status: ACTIVE | Noted: 2017-10-13

## 2019-09-10 PROBLEM — F32.A DEPRESSION: Status: ACTIVE | Noted: 2017-09-01

## 2019-09-10 LAB
ALBUMIN SERPL-MCNC: 2.9 G/DL (ref 3.5–4.8)
ALBUMIN/GLOB SERPL: 1 G/DL (ref 1–1.7)
ALP SERPL-CCNC: 119 U/L (ref 32–91)
ALT SERPL W P-5'-P-CCNC: 33 U/L (ref 14–54)
ANION GAP SERPL CALCULATED.3IONS-SCNC: 13.4 MMOL/L (ref 5–15)
AST SERPL-CCNC: 25 U/L (ref 15–41)
BASOPHILS # BLD AUTO: 0.1 10*3/MM3 (ref 0–0.2)
BASOPHILS NFR BLD AUTO: 0.5 % (ref 0–1.5)
BILIRUB SERPL-MCNC: 1.2 MG/DL (ref 0.3–1.2)
BUN BLD-MCNC: 6 MG/DL (ref 8–20)
BUN/CREAT SERPL: 8.6 (ref 5.4–26.2)
CALCIUM SPEC-SCNC: 8 MG/DL (ref 8.9–10.3)
CHLORIDE SERPL-SCNC: 100 MMOL/L (ref 101–111)
CO2 SERPL-SCNC: 26 MMOL/L (ref 22–32)
CREAT BLD-MCNC: 0.7 MG/DL (ref 0.4–1)
DEPRECATED RDW RBC AUTO: 47.7 FL (ref 37–54)
EOSINOPHIL # BLD AUTO: 0.2 10*3/MM3 (ref 0–0.4)
EOSINOPHIL NFR BLD AUTO: 1.4 % (ref 0.3–6.2)
ERYTHROCYTE [DISTWIDTH] IN BLOOD BY AUTOMATED COUNT: 16 % (ref 12.3–15.4)
GFR SERPL CREATININE-BSD FRML MDRD: 81 ML/MIN/1.73
GLOBULIN UR ELPH-MCNC: 2.8 GM/DL (ref 2.5–3.8)
GLUCOSE BLD-MCNC: 109 MG/DL (ref 65–99)
HCT VFR BLD AUTO: 35.5 % (ref 34–46.6)
HGB BLD-MCNC: 11.8 G/DL (ref 12–15.9)
LYMPHOCYTES # BLD AUTO: 0.8 10*3/MM3 (ref 0.7–3.1)
LYMPHOCYTES NFR BLD AUTO: 7.1 % (ref 19.6–45.3)
MCH RBC QN AUTO: 28.2 PG (ref 26.6–33)
MCHC RBC AUTO-ENTMCNC: 33.2 G/DL (ref 31.5–35.7)
MCV RBC AUTO: 85.1 FL (ref 79–97)
MONOCYTES # BLD AUTO: 0.9 10*3/MM3 (ref 0.1–0.9)
MONOCYTES NFR BLD AUTO: 7.5 % (ref 5–12)
NEUTROPHILS # BLD AUTO: 10 10*3/MM3 (ref 1.7–7)
NEUTROPHILS NFR BLD AUTO: 83.5 % (ref 42.7–76)
NRBC BLD AUTO-RTO: 0 /100 WBC (ref 0–0.2)
PLATELET # BLD AUTO: 199 10*3/MM3 (ref 140–450)
PMV BLD AUTO: 7 FL (ref 6–12)
POTASSIUM BLD-SCNC: 3.4 MMOL/L (ref 3.6–5.1)
PROT SERPL-MCNC: 5.7 G/DL (ref 6.1–7.9)
RBC # BLD AUTO: 4.17 10*6/MM3 (ref 3.77–5.28)
SODIUM BLD-SCNC: 136 MMOL/L (ref 136–144)
WBC NRBC COR # BLD: 12 10*3/MM3 (ref 3.4–10.8)

## 2019-09-10 PROCEDURE — 80053 COMPREHEN METABOLIC PANEL: CPT | Performed by: SURGERY

## 2019-09-10 PROCEDURE — 85025 COMPLETE CBC W/AUTO DIFF WBC: CPT | Performed by: SURGERY

## 2019-09-10 PROCEDURE — 25810000003 SODIUM CHLORIDE 0.9 % WITH KCL 20 MEQ 20-0.9 MEQ/L-% SOLUTION: Performed by: SURGERY

## 2019-09-10 PROCEDURE — 25010000002 FUROSEMIDE PER 20 MG: Performed by: FAMILY MEDICINE

## 2019-09-10 PROCEDURE — 25010000002 MORPHINE PER 10 MG: Performed by: SURGERY

## 2019-09-10 PROCEDURE — 25010000003 POTASSIUM CHLORIDE 10 MEQ/100ML SOLUTION: Performed by: SURGERY

## 2019-09-10 RX ORDER — ALUMINA, MAGNESIA, AND SIMETHICONE 2400; 2400; 240 MG/30ML; MG/30ML; MG/30ML
15 SUSPENSION ORAL EVERY 6 HOURS PRN
Status: DISCONTINUED | OUTPATIENT
Start: 2019-09-10 | End: 2019-09-11 | Stop reason: HOSPADM

## 2019-09-10 RX ORDER — FUROSEMIDE 10 MG/ML
20 INJECTION INTRAMUSCULAR; INTRAVENOUS ONCE
Status: COMPLETED | OUTPATIENT
Start: 2019-09-10 | End: 2019-09-10

## 2019-09-10 RX ORDER — FAMOTIDINE 20 MG/1
20 TABLET, FILM COATED ORAL
Status: DISCONTINUED | OUTPATIENT
Start: 2019-09-10 | End: 2019-09-11 | Stop reason: HOSPADM

## 2019-09-10 RX ADMIN — LOSARTAN POTASSIUM 25 MG: 25 TABLET, FILM COATED ORAL at 09:28

## 2019-09-10 RX ADMIN — ALUMINUM HYDROXIDE, MAGNESIUM HYDROXIDE, AND DIMETHICONE 15 ML: 400; 400; 40 SUSPENSION ORAL at 21:08

## 2019-09-10 RX ADMIN — AMLODIPINE BESYLATE 10 MG: 5 TABLET ORAL at 09:27

## 2019-09-10 RX ADMIN — LEVOTHYROXINE SODIUM 100 MCG: 100 TABLET ORAL at 05:04

## 2019-09-10 RX ADMIN — METOPROLOL TARTRATE 100 MG: 50 TABLET, FILM COATED ORAL at 09:28

## 2019-09-10 RX ADMIN — FAMOTIDINE 20 MG: 10 INJECTION, SOLUTION INTRAVENOUS at 09:28

## 2019-09-10 RX ADMIN — ESCITALOPRAM OXALATE 10 MG: 10 TABLET ORAL at 09:28

## 2019-09-10 RX ADMIN — HYDROCODONE BITARTRATE AND ACETAMINOPHEN 1 TABLET: 5; 325 TABLET ORAL at 05:04

## 2019-09-10 RX ADMIN — FAMOTIDINE 20 MG: 20 TABLET, FILM COATED ORAL at 16:51

## 2019-09-10 RX ADMIN — RIVAROXABAN 20 MG: 20 TABLET, FILM COATED ORAL at 19:25

## 2019-09-10 RX ADMIN — METOPROLOL TARTRATE 100 MG: 50 TABLET, FILM COATED ORAL at 21:08

## 2019-09-10 RX ADMIN — BUMETANIDE 2 MG: 1 TABLET ORAL at 09:28

## 2019-09-10 RX ADMIN — MORPHINE SULFATE 4 MG: 4 INJECTION INTRAVENOUS at 00:43

## 2019-09-10 RX ADMIN — FUROSEMIDE 20 MG: 10 INJECTION, SOLUTION INTRAVENOUS at 09:57

## 2019-09-10 RX ADMIN — CLONIDINE HYDROCHLORIDE 0.1 MG: 0.1 TABLET ORAL at 09:28

## 2019-09-10 RX ADMIN — GABAPENTIN 300 MG: 300 CAPSULE ORAL at 21:08

## 2019-09-10 RX ADMIN — SODIUM CHLORIDE AND POTASSIUM CHLORIDE 100 ML/HR: 9; 1.49 INJECTION, SOLUTION INTRAVENOUS at 00:26

## 2019-09-10 RX ADMIN — CLONIDINE HYDROCHLORIDE 0.1 MG: 0.1 TABLET ORAL at 21:08

## 2019-09-10 RX ADMIN — GABAPENTIN 200 MG: 100 CAPSULE ORAL at 09:28

## 2019-09-10 RX ADMIN — POTASSIUM CHLORIDE 10 MEQ: 10 INJECTION, SOLUTION INTRAVENOUS at 05:38

## 2019-09-10 NOTE — PROGRESS NOTES
LOS: 5 days   Patient Care Team:  Alex Rodriguez MD as PCP - General  Alex Rodriguez MD as PCP - Claims Attributed  Alex Rodriguez MD as PCP - Family Medicine    Subjective:  Continues to have abdominal pain  Objective:   Afebrile      Review of Systems:   Review of Systems   Constitutional: Positive for appetite change and fatigue.   HENT: Negative.    Respiratory: Negative for shortness of breath.    Cardiovascular: Negative for chest pain, palpitations and leg swelling.   Gastrointestinal: Positive for abdominal distention, abdominal pain and nausea.   Genitourinary: Negative for difficulty urinating.   Musculoskeletal: Positive for arthralgias and back pain.   Neurological: Negative.    Psychiatric/Behavioral: The patient is nervous/anxious.    All other systems reviewed and are negative.          Vital Signs  Temp:  [98.2 °F (36.8 °C)-98.9 °F (37.2 °C)] 98.9 °F (37.2 °C)  Heart Rate:  [73-90] 90  Resp:  [18-20] 20  BP: (143-158)/(87-90) 158/88    Physical Exam:  Physical Exam   Constitutional: She is oriented to person, place, and time. She appears well-developed and well-nourished.   HENT:   Head: Normocephalic and atraumatic.   Eyes: EOM are normal. Pupils are equal, round, and reactive to light.   Neck: Normal range of motion.   Cardiovascular: Regular rhythm.   Pulmonary/Chest: Effort normal.   Abdominal: Soft. She exhibits distension. She exhibits no mass. There is tenderness. There is no rebound and no guarding.   Musculoskeletal: Normal range of motion.   Neurological: She is alert and oriented to person, place, and time.   Skin: Skin is warm.        Radiology:  Xr Chest 1 View    Result Date: 9/8/2019  Mild pulmonary edema pattern with small bilateral pleural effusions. Stable cardiomegaly.  Electronically Signed By-Piedad Bui On:9/8/2019 12:29 PM This report was finalized on [id number] by  Piedad Bui, .         Results Review:     I reviewed the patient's new clinical results.  I  reviewed the patient's new imaging results and agree with the interpretation.    Medication Review:   Scheduled Meds:  acetaminophen 1,000 mg Oral Daily   amLODIPine 10 mg Oral Daily   bumetanide 2 mg Oral Daily   CloNIDine 0.1 mg Oral BID   escitalopram 10 mg Oral Daily   famotidine 20 mg Intravenous BID   febuxostat 40 mg Oral Daily   gabapentin 200 mg Oral Daily   gabapentin 300 mg Oral Nightly   levothyroxine 100 mcg Oral Daily   losartan 25 mg Oral Q24H   metoprolol tartrate 100 mg Oral BID   rivaroxaban 20 mg Oral Daily With Dinner   vitamin C 500 mg Oral Daily     Continuous Infusions:  sodium chloride 9 mL/hr Last Rate: 9 mL/hr (09/05/19 1104)   sodium chloride 0.9 % with KCl 20 mEq 100 mL/hr Last Rate: 100 mL/hr (09/10/19 0026)     PRN Meds:.HYDROcodone-acetaminophen  •  Morphine **AND** naloxone  •  Morphine **AND** naloxone  •  ondansetron **OR** ondansetron  •  potassium chloride **OR** potassium chloride **OR** potassium chloride  •  promethazine  •  sodium chloride    Labs:    CBC    Results from last 7 days   Lab Units 09/10/19  0342 09/09/19  0249 09/08/19  1629 09/08/19  0547 09/06/19  0311   WBC 10*3/mm3 12.00* 12.90* 13.40* 13.50* 10.50   HEMOGLOBIN g/dL 11.8* 11.3* 11.7* 11.9* 12.6   PLATELETS 10*3/mm3 199 175 193 191 195     BMP Results from last 7 days   Lab Units 09/10/19  0342 09/09/19  1452 09/09/19  0249 09/08/19  1630 09/08/19  0547 09/06/19  0311   SODIUM mmol/L 136  --  135* 133* 136 134*   POTASSIUM mmol/L 3.4* 3.9 3.4* 3.1* 2.9* 3.6   CHLORIDE mmol/L 100*  --  101 98* 100* 102   CO2 mmol/L 26.0  --  24.0 23.0 24.0 21.0*   BUN mg/dL 6*  --  6* 6* 7* 6*   CREATININE mg/dL 0.70  --  0.70 0.70 0.70 0.80   GLUCOSE mg/dL 109*  --  92 98 127* 163*   MAGNESIUM mg/dL  --   --  1.7*  --  1.3* 1.5*   PHOSPHORUS mg/dL  --   --   --   --   --  4.0     Cr Clearance Estimated Creatinine Clearance: 73.7 mL/min (by C-G formula based on SCr of 0.7 mg/dL).  Coag     HbA1C No results found for:  HGBA1C  Blood Glucose No results found for: POCGLU  Infection     CMP Results from last 7 days   Lab Units 09/10/19  0342 09/09/19  1452 09/09/19  0249 09/08/19  1630 09/08/19  0547 09/06/19  0311   SODIUM mmol/L 136  --  135* 133* 136 134*   POTASSIUM mmol/L 3.4* 3.9 3.4* 3.1* 2.9* 3.6   CHLORIDE mmol/L 100*  --  101 98* 100* 102   CO2 mmol/L 26.0  --  24.0 23.0 24.0 21.0*   BUN mg/dL 6*  --  6* 6* 7* 6*   CREATININE mg/dL 0.70  --  0.70 0.70 0.70 0.80   GLUCOSE mg/dL 109*  --  92 98 127* 163*   ALBUMIN g/dL 2.90*  --  2.80* 3.00* 3.20* 3.30*   BILIRUBIN mg/dL 1.2  --  1.3* 1.1 1.1 1.2   ALK PHOS U/L 119*  --  98* 71 71 71   AST (SGOT) U/L 25  --  21 18 23 27   ALT (SGPT) U/L 33  --  25 24 24 17     UA  Results from last 7 days   Lab Units 09/08/19  1432   NITRITE UA  Negative     Radiology(recent) Xr Chest 1 View    Result Date: 9/8/2019  Mild pulmonary edema pattern with small bilateral pleural effusions. Stable cardiomegaly.  Electronically Signed By-Piedad Bui On:9/8/2019 12:29 PM This report was finalized on [id number] by  Piedad Bui, .     Assessment:  Status post elective right hemicolectomy   malignant neoplasm of ascending colon (CMS/HCC)  Invasive moderately differentiated adenocarcinoma of the ascending colon    Atrial fibrillation (CMS/HCC)    Hypertension associated with chronic kidney disease    Anemia    CKD (chronic kidney disease)    Carcinoma of ascending colon (CMS/HCC)  Exogenous obesity  Dyslipidemia  Peripheral vascular disease  Connective tissue disease  Atherosclerotic heart disease of native coronary arteries with angina pectoris  Gastroesophageal reflux disease without esophagitis  Hyperuricemia with gout  Acquired hypothyroidism  Iron deficiency anemia  Hypoventilation apnea syndrome with PHOENIX  Chronic low back pain  Rheumatoid arthritis with positive rheumatoid factor of multiple joints  Acute pleural effusions      Plan:  Postoperative pathway//fluid balance//aggressive pulmonary  zhane Rodriguez MD  09/10/19  8:12 AM

## 2019-09-10 NOTE — PLAN OF CARE
Problem: Fall Risk (Adult)  Goal: Identify Related Risk Factors and Signs and Symptoms   09/06/19 0229 09/09/19 1358   Fall Risk (Adult)   Related Risk Factors (Fall Risk) --  history of falls   Signs and Symptoms (Fall Risk) presence of risk factors --

## 2019-09-10 NOTE — PLAN OF CARE
Problem: Patient Care Overview  Goal: Individualization and Mutuality  Outcome: Ongoing (interventions implemented as appropriate)    Goal: Discharge Needs Assessment  Outcome: Ongoing (interventions implemented as appropriate)    Goal: Interprofessional Rounds/Family Conf  Outcome: Ongoing (interventions implemented as appropriate)      Problem: Fall Risk (Adult)  Goal: Identify Related Risk Factors and Signs and Symptoms  Outcome: Ongoing (interventions implemented as appropriate)      Problem: Pain, Acute (Adult)  Goal: Identify Related Risk Factors and Signs and Symptoms  Outcome: Ongoing (interventions implemented as appropriate)      Problem: Skin Injury Risk (Adult)  Goal: Identify Related Risk Factors and Signs and Symptoms  Outcome: Ongoing (interventions implemented as appropriate)    Goal: Skin Health and Integrity  Outcome: Ongoing (interventions implemented as appropriate)

## 2019-09-11 ENCOUNTER — APPOINTMENT (OUTPATIENT)
Dept: GENERAL RADIOLOGY | Facility: HOSPITAL | Age: 79
End: 2019-09-11

## 2019-09-11 VITALS
TEMPERATURE: 98.7 F | RESPIRATION RATE: 18 BRPM | OXYGEN SATURATION: 99 % | WEIGHT: 271.61 LBS | DIASTOLIC BLOOD PRESSURE: 80 MMHG | BODY MASS INDEX: 48.12 KG/M2 | HEART RATE: 66 BPM | SYSTOLIC BLOOD PRESSURE: 150 MMHG | HEIGHT: 63 IN

## 2019-09-11 LAB
ALBUMIN SERPL-MCNC: 2.6 G/DL (ref 3.5–4.8)
ALBUMIN/GLOB SERPL: 1 G/DL (ref 1–1.7)
ALP SERPL-CCNC: 121 U/L (ref 32–91)
ALT SERPL W P-5'-P-CCNC: 52 U/L (ref 14–54)
ANION GAP SERPL CALCULATED.3IONS-SCNC: 12.2 MMOL/L (ref 5–15)
AST SERPL-CCNC: 54 U/L (ref 15–41)
BASOPHILS # BLD MANUAL: 0.08 10*3/MM3 (ref 0–0.2)
BASOPHILS NFR BLD AUTO: 1 % (ref 0–1.5)
BILIRUB SERPL-MCNC: 1.2 MG/DL (ref 0.3–1.2)
BUN BLD-MCNC: 5 MG/DL (ref 8–20)
BUN/CREAT SERPL: 7.1 (ref 5.4–26.2)
CALCIUM SPEC-SCNC: 8.1 MG/DL (ref 8.9–10.3)
CHLORIDE SERPL-SCNC: 96 MMOL/L (ref 101–111)
CO2 SERPL-SCNC: 27 MMOL/L (ref 22–32)
CREAT BLD-MCNC: 0.7 MG/DL (ref 0.4–1)
DEPRECATED RDW RBC AUTO: 49 FL (ref 37–54)
ERYTHROCYTE [DISTWIDTH] IN BLOOD BY AUTOMATED COUNT: 16.1 % (ref 12.3–15.4)
GFR SERPL CREATININE-BSD FRML MDRD: 81 ML/MIN/1.73
GLOBULIN UR ELPH-MCNC: 2.6 GM/DL (ref 2.5–3.8)
GLUCOSE BLD-MCNC: 96 MG/DL (ref 65–99)
HCT VFR BLD AUTO: 33.5 % (ref 34–46.6)
HGB BLD-MCNC: 11.1 G/DL (ref 12–15.9)
LYMPHOCYTES # BLD MANUAL: 0.6 10*3/MM3 (ref 0.7–3.1)
LYMPHOCYTES NFR BLD MANUAL: 10 % (ref 5–12)
LYMPHOCYTES NFR BLD MANUAL: 8 % (ref 19.6–45.3)
MCH RBC QN AUTO: 28.2 PG (ref 26.6–33)
MCHC RBC AUTO-ENTMCNC: 33.1 G/DL (ref 31.5–35.7)
MCV RBC AUTO: 85 FL (ref 79–97)
MONOCYTES # BLD AUTO: 0.75 10*3/MM3 (ref 0.1–0.9)
NEUTROPHILS # BLD AUTO: 6.08 10*3/MM3 (ref 1.7–7)
NEUTROPHILS NFR BLD MANUAL: 80 % (ref 42.7–76)
NEUTS BAND NFR BLD MANUAL: 1 % (ref 0–5)
OVALOCYTES BLD QL SMEAR: ABNORMAL
PLAT MORPH BLD: NORMAL
PLATELET # BLD AUTO: 214 10*3/MM3 (ref 140–450)
PMV BLD AUTO: 7.2 FL (ref 6–12)
POIKILOCYTOSIS BLD QL SMEAR: ABNORMAL
POLYCHROMASIA BLD QL SMEAR: ABNORMAL
POTASSIUM BLD-SCNC: 3.2 MMOL/L (ref 3.6–5.1)
PROT SERPL-MCNC: 5.2 G/DL (ref 6.1–7.9)
RBC # BLD AUTO: 3.94 10*6/MM3 (ref 3.77–5.28)
SCAN SLIDE: NORMAL
SODIUM BLD-SCNC: 132 MMOL/L (ref 136–144)
WBC MORPH BLD: NORMAL
WBC NRBC COR # BLD: 7.5 10*3/MM3 (ref 3.4–10.8)

## 2019-09-11 PROCEDURE — 80053 COMPREHEN METABOLIC PANEL: CPT | Performed by: SURGERY

## 2019-09-11 PROCEDURE — 74018 RADEX ABDOMEN 1 VIEW: CPT

## 2019-09-11 PROCEDURE — 85007 BL SMEAR W/DIFF WBC COUNT: CPT | Performed by: SURGERY

## 2019-09-11 PROCEDURE — 85025 COMPLETE CBC W/AUTO DIFF WBC: CPT | Performed by: SURGERY

## 2019-09-11 PROCEDURE — 99024 POSTOP FOLLOW-UP VISIT: CPT | Performed by: SURGERY

## 2019-09-11 RX ORDER — ONDANSETRON 8 MG/1
8 TABLET, ORALLY DISINTEGRATING ORAL EVERY 8 HOURS PRN
Qty: 30 TABLET | Refills: 5 | Status: SHIPPED | OUTPATIENT
Start: 2019-09-11 | End: 2020-06-11

## 2019-09-11 RX ORDER — COLESEVELAM 180 1/1
625 TABLET ORAL 2 TIMES DAILY WITH MEALS
Status: DISCONTINUED | OUTPATIENT
Start: 2019-09-11 | End: 2019-09-11 | Stop reason: HOSPADM

## 2019-09-11 RX ORDER — ACETAMINOPHEN 325 MG/1
650 TABLET ORAL EVERY 6 HOURS PRN
Status: DISCONTINUED | OUTPATIENT
Start: 2019-09-11 | End: 2019-09-11 | Stop reason: HOSPADM

## 2019-09-11 RX ADMIN — ESCITALOPRAM OXALATE 10 MG: 10 TABLET ORAL at 07:51

## 2019-09-11 RX ADMIN — GABAPENTIN 200 MG: 100 CAPSULE ORAL at 07:51

## 2019-09-11 RX ADMIN — ACETAMINOPHEN 650 MG: 325 TABLET ORAL at 08:05

## 2019-09-11 RX ADMIN — LOSARTAN POTASSIUM 25 MG: 25 TABLET, FILM COATED ORAL at 07:52

## 2019-09-11 RX ADMIN — POTASSIUM CHLORIDE 40 MEQ: 1500 TABLET, EXTENDED RELEASE ORAL at 07:53

## 2019-09-11 RX ADMIN — POTASSIUM CHLORIDE 40 MEQ: 1500 TABLET, EXTENDED RELEASE ORAL at 05:06

## 2019-09-11 RX ADMIN — METOPROLOL TARTRATE 100 MG: 50 TABLET, FILM COATED ORAL at 07:52

## 2019-09-11 RX ADMIN — CLONIDINE HYDROCHLORIDE 0.1 MG: 0.1 TABLET ORAL at 07:52

## 2019-09-11 RX ADMIN — FAMOTIDINE 20 MG: 20 TABLET, FILM COATED ORAL at 07:52

## 2019-09-11 RX ADMIN — LEVOTHYROXINE SODIUM 100 MCG: 100 TABLET ORAL at 05:06

## 2019-09-11 RX ADMIN — BUMETANIDE 2 MG: 1 TABLET ORAL at 07:52

## 2019-09-11 RX ADMIN — AMLODIPINE BESYLATE 10 MG: 5 TABLET ORAL at 07:51

## 2019-09-11 RX ADMIN — COLESEVELAM HYDROCHLORIDE 625 MG: 625 TABLET, FILM COATED ORAL at 09:09

## 2019-09-11 NOTE — PROGRESS NOTES
Continued Stay Note   [facility]     Patient Name: Yasmeen Rahman  MRN: [id number]  Today's Date: 9/11/2019    Admit Date: 9/5/2019    Discharge Plan     Row Name 09/11/19 1142       Plan    Plan  [facility] home care with 3in1 bsc from \[facility]\""    Patient/Family in Agreement with Plan  yes    Plan Comments  referral to bryson at [facility] for 3in1 bsc. order received. Reviewed dc plan with patient and daughter. both verbalize understanding. will dc today          Expected Discharge Date and Time     Expected Discharge Date Expected Discharge Time    Sep 11, 2019             Amber Brown RN

## 2019-09-11 NOTE — DISCHARGE INSTRUCTIONS
It is okay to take over-the-counter diarrhea medicine.  Do not take laxatives unless you are constipated.  If you become constipated stop taking the over-the-counter diarrhea medicine.    Call if you have any increasing abdominal pain or fever.  I have prescribed nausea medicine.  It is okay to shower.    Follow-up with Dr. Cunha later next week.

## 2019-09-11 NOTE — PLAN OF CARE
Problem: Surgery Nonspecified (Adult)  Goal: Signs and Symptoms of Listed Potential Problems Will be Absent, Minimized or Managed (Surgery Nonspecified)  Outcome: Ongoing (interventions implemented as appropriate)   09/11/19 0900   Goal/Outcome Evaluation   Problems Present (Surgery) other (see comments)  (Loose stool)

## 2019-09-11 NOTE — PLAN OF CARE
Problem: Skin Injury Risk (Adult)  Goal: Identify Related Risk Factors and Signs and Symptoms  Outcome: Ongoing (interventions implemented as appropriate)   09/11/19 0900   Skin Injury Risk (Adult)   Related Risk Factors (Skin Injury Risk) body weight extremes;edema;medication

## 2019-09-11 NOTE — PLAN OF CARE
Problem: Fall Risk (Adult)  Goal: Identify Related Risk Factors and Signs and Symptoms  Outcome: Ongoing (interventions implemented as appropriate)   09/10/19 1320   Fall Risk (Adult)   Related Risk Factors (Fall Risk) fatigue/slow reaction;fear of falling;gait/mobility problems   Signs and Symptoms (Fall Risk) presence of risk factors

## 2019-09-11 NOTE — PROGRESS NOTES
LOS: 6 days   Patient Care Team:  Alex Rodriguez MD as PCP - General  Alex Rodriguez MD as PCP - Claims Attributed  Alex Rodriguez MD as PCP - Family Medicine    Subjective:  Continues to complain of abdominal pain//intractable diarrhea  Objective:   Afebrile      Review of Systems:   Review of Systems   Constitutional: Negative.    HENT: Negative.    Eyes: Negative.    Respiratory: Negative.    Cardiovascular: Negative.    Gastrointestinal: Positive for abdominal distention, abdominal pain and diarrhea.   Genitourinary: Negative.    Musculoskeletal: Negative.    Neurological: Negative.    Psychiatric/Behavioral: Negative.            Vital Signs  Temp:  [97.6 °F (36.4 °C)-98.9 °F (37.2 °C)] 98.7 °F (37.1 °C)  Heart Rate:  [66-74] 66  Resp:  [18-20] 18  BP: (141-150)/(80-92) 150/80    Physical Exam:  Physical Exam   Constitutional: She appears well-developed and well-nourished.   HENT:   Head: Normocephalic and atraumatic.   Eyes: EOM are normal. Pupils are equal, round, and reactive to light.   Neck: Normal range of motion.   Cardiovascular: Regular rhythm.   Pulmonary/Chest: Effort normal and breath sounds normal.   Abdominal: Soft. She exhibits distension. There is tenderness.   Skin: Skin is warm.   Nursing note and vitals reviewed.       Radiology:  Xr Chest 1 View    Result Date: 9/8/2019  Mild pulmonary edema pattern with small bilateral pleural effusions. Stable cardiomegaly.  Electronically Signed By-Piedad Bui On:9/8/2019 12:29 PM This report was finalized on [id number] by  Piedad Bui, .         Results Review:     I reviewed the patient's new clinical results.  I reviewed the patient's new imaging results and agree with the interpretation.    Medication Review:   Scheduled Meds:  amLODIPine 10 mg Oral Daily   bumetanide 2 mg Oral Daily   CloNIDine 0.1 mg Oral BID   colesevelam 625 mg Oral BID With Meals   escitalopram 10 mg Oral Daily   famotidine 20 mg Oral BID AC   gabapentin 200 mg Oral  Daily   gabapentin 300 mg Oral Nightly   levothyroxine 100 mcg Oral Daily   losartan 25 mg Oral Q24H   metoprolol tartrate 100 mg Oral BID   rivaroxaban 20 mg Oral Daily With Dinner     Continuous Infusions:  sodium chloride 9 mL/hr Last Rate: 9 mL/hr (09/05/19 1104)   sodium chloride 0.9 % with KCl 20 mEq 100 mL/hr Last Rate: Stopped (09/10/19 1014)     PRN Meds:.•  acetaminophen  •  aluminum-magnesium hydroxide-simethicone  •  Morphine **AND** naloxone  •  Morphine **AND** naloxone  •  ondansetron **OR** ondansetron  •  potassium chloride **OR** potassium chloride **OR** potassium chloride  •  promethazine  •  sodium chloride    Labs:    CBC    Results from last 7 days   Lab Units 09/11/19  0347 09/10/19  0342 09/09/19  0249 09/08/19  1629 09/08/19  0547 09/06/19  0311   WBC 10*3/mm3 7.50 12.00* 12.90* 13.40* 13.50* 10.50   HEMOGLOBIN g/dL 11.1* 11.8* 11.3* 11.7* 11.9* 12.6   PLATELETS 10*3/mm3 214 199 175 193 191 195     BMP Results from last 7 days   Lab Units 09/11/19  0347 09/10/19  0342 09/09/19  1452 09/09/19  0249 09/08/19  1630 09/08/19  0547 09/06/19  0311   SODIUM mmol/L 132* 136  --  135* 133* 136 134*   POTASSIUM mmol/L 3.2* 3.4* 3.9 3.4* 3.1* 2.9* 3.6   CHLORIDE mmol/L 96* 100*  --  101 98* 100* 102   CO2 mmol/L 27.0 26.0  --  24.0 23.0 24.0 21.0*   BUN mg/dL 5* 6*  --  6* 6* 7* 6*   CREATININE mg/dL 0.70 0.70  --  0.70 0.70 0.70 0.80   GLUCOSE mg/dL 96 109*  --  92 98 127* 163*   MAGNESIUM mg/dL  --   --   --  1.7*  --  1.3* 1.5*   PHOSPHORUS mg/dL  --   --   --   --   --   --  4.0     Cr Clearance Estimated Creatinine Clearance: 73.7 mL/min (by C-G formula based on SCr of 0.7 mg/dL).  Coag     HbA1C No results found for: HGBA1C  Blood Glucose No results found for: POCGLU  Infection     CMP Results from last 7 days   Lab Units 09/11/19  0347 09/10/19  0342 09/09/19  1452 09/09/19  0249 09/08/19  1630 09/08/19  0547 09/06/19  0311   SODIUM mmol/L 132* 136  --  135* 133* 136 134*   POTASSIUM mmol/L  3.2* 3.4* 3.9 3.4* 3.1* 2.9* 3.6   CHLORIDE mmol/L 96* 100*  --  101 98* 100* 102   CO2 mmol/L 27.0 26.0  --  24.0 23.0 24.0 21.0*   BUN mg/dL 5* 6*  --  6* 6* 7* 6*   CREATININE mg/dL 0.70 0.70  --  0.70 0.70 0.70 0.80   GLUCOSE mg/dL 96 109*  --  92 98 127* 163*   ALBUMIN g/dL 2.60* 2.90*  --  2.80* 3.00* 3.20* 3.30*   BILIRUBIN mg/dL 1.2 1.2  --  1.3* 1.1 1.1 1.2   ALK PHOS U/L 121* 119*  --  98* 71 71 71   AST (SGOT) U/L 54* 25  --  21 18 23 27   ALT (SGPT) U/L 52 33  --  25 24 24 17     UA  Results from last 7 days   Lab Units 09/08/19  1432   NITRITE UA  Negative     Radiology(recent) No radiology results for the last day   Assessment:  Status post elective right hemicolectomy   malignant neoplasm of ascending colon (CMS/HCC)  Invasive moderately differentiated adenocarcinoma of the ascending colon    Atrial fibrillation (CMS/HCC)    Hypertension associated with chronic kidney disease    Anemia    CKD (chronic kidney disease)    Carcinoma of ascending colon (CMS/HCC)  Exogenous obesity  Dyslipidemia  Peripheral vascular disease  Connective tissue disease  Atherosclerotic heart disease of native coronary arteries with angina pectoris  Gastroesophageal reflux disease without esophagitis  Hyperuricemia with gout  Acquired hypothyroidism  Iron deficiency anemia  Hypoventilation apnea syndrome with PHOENIX  Chronic low back pain  Rheumatoid arthritis with positive rheumatoid factor of multiple joints  Acute pleural effusions  Hypokalemia    Plan:  Postoperative pathway//family reassurance//oncologic evaluation//replacement of electrolytes        Alex Rodriguez MD  09/11/19  8:36 AM

## 2019-09-11 NOTE — DISCHARGE SUMMARY
Date of Discharge:  9/11/2019    Discharge Diagnosis: Right colon cancer    Presenting Problem/History of Present Illness  Active Hospital Problems    Diagnosis  POA   • **Malignant neoplasm of ascending colon (CMS/HCC) [C18.2]  Unknown   • Carcinoma of ascending colon (CMS/HCC) [C18.2]  Yes   • Anemia [D64.9]  Yes   • CKD (chronic kidney disease) [N18.9]  Yes   • Atrial fibrillation (CMS/HCC) [I48.91]  Yes   • Hypertension [I10]  Yes      Resolved Hospital Problems   No resolved problems to display.          Hospital Course  Patient is a 79 y.o. female presented with right colon cancer and underwent elective open right hemicolectomy by Dr. Cunha..  Postoperatively she did well except for some expected nausea and she also had diarrhea and was even incontinent of stools.  She has a history of this and was likely made worse by the fact that she had a right hemicolectomy.  She will be discharged home today on postoperative day 6 with follow-up with Dr. Cunha next week.  I have told her she can take over-the-counter diarrhea medicines as needed.  Dr. Rodriguez is also given her a prescription for WelChol.    Path report shows all lymph nodes negative and margins negative and I discussed this with the patient.    Procedures Performed    Procedure(s):  Open right hemicolectomy  -------------------       Consults:   Consults     Date and Time Order Name Status Description    9/11/2019 0836 Hematology & Oncology Inpatient Consult      9/6/2019 1118 Inpatient Family Practice Consult Completed           Pertinent Test Results:   Order Name Source Comment Collection Info Order Time   COMPREHENSIVE METABOLIC PANEL   Collected By: Tosin Corona 9/6/2019 12:03 AM   MAGNESIUM   Collected By: Tosin Corona 9/6/2019 12:03 AM   PHOSPHORUS   Collected By: Tosin Corona 9/6/2019 12:03 AM   TISSUE PATHOLOGY EXAM Large Intestine, Right / Ascending Colon  Collected By: Dominick Cunha DO 9/5/2019  1:20 PM        Pathology report demonstrated all lymph nodes negative and margins negative.  I discussed this with the patient.    Condition on Discharge:  good    Vital Signs  Temp:  [97.6 °F (36.4 °C)-98.9 °F (37.2 °C)] 98.7 °F (37.1 °C)  Heart Rate:  [66-74] 66  Resp:  [18-20] 18  BP: (141-150)/(80-90) 150/80    Physical Exam:     General Appearance:    Alert, cooperative, in no acute distress   Head:    Normocephalic, without obvious abnormality, atraumatic   Eyes:            Lids and lashes normal, conjunctivae and sclerae normal, no   icterus, no pallor, corneas clear, PERRLA   Ears:    Ears appear intact with no abnormalities noted   Throat:   No oral lesions, no thrush, oral mucosa moist   Neck:   No adenopathy, supple, trachea midline, no thyromegaly, no     carotid bruit, no JVD   Back:     No kyphosis present, no scoliosis present, no skin lesions,       erythema or scars, no tenderness to percussion or                   palpation,   range of motion normal   Lungs:     Clear to auscultation,respirations regular, even and                   unlabored    Heart:    Regular rhythm and normal rate, normal S1 and S2, no            murmur, no gallop, no rub, no click   Breast Exam:    Deferred   Abdomen:     Normal bowel sounds, no masses, no organomegaly, soft        non-tender, non-distended, no guarding, no rebound                 tenderness   Genitalia:    Deferred   Extremities:   Moves all extremities well, no edema, no cyanosis, no              redness   Pulses:   Pulses palpable and equal bilaterally   Skin:   No bleeding, bruising or rash   Lymph nodes:   No palpable adenopathy   Neurologic:   Cranial nerves 2 - 12 grossly intact, sensation intact, DTR        present and equal bilaterally       Discharge Disposition  Home or Self Care    Discharge Medications     Discharge Medications      New Medications      Instructions Start Date   ondansetron ODT 8 MG disintegrating tablet  Commonly known as:  ZOFRAN ODT   8  mg, Oral, Every 8 Hours PRN         Continue These Medications      Instructions Start Date   acetaminophen 500 MG tablet  Commonly known as:  TYLENOL   2 tablets, Oral, Daily      TYLENOL 8 HOUR ARTHRITIS PAIN PO   650 mg, Oral, 2 Times Daily      amLODIPine 10 MG tablet  Commonly known as:  NORVASC   Daily      aspirin 81 MG tablet   81 mg, Oral, Daily      b complex vitamins tablet   B COMPLEX TABS      bumetanide 2 MG tablet  Commonly known as:  BUMEX   2 mg, Oral, Daily      CALCIUM 600+D 600-200 MG-UNIT tablet  Generic drug:  Calcium Carbonate-Vitamin D   1,200 mg, Oral, Daily      CloNIDine 0.1 MG tablet  Commonly known as:  CATAPRES   TAKE 1 TABLET TWICE A DAY      Cranberry 125 MG tablet   500 mg, Oral      cyanocobalamin 2500 MCG tablet tablet  Commonly known as:  VITAMIN B-12   1000 mcg      escitalopram 10 MG tablet  Commonly known as:  LEXAPRO   10 mg, Oral, Daily      ferrous sulfate 325 (65 FE) MG tablet   TAKE 1 TABLET BY MOUTh DAILY WITH MEALS      gabapentin 100 MG capsule  Commonly known as:  NEURONTIN   200 mg, Oral, Every Morning      gabapentin 300 MG capsule  Commonly known as:  NEURONTIN   300 mg, Oral, Nightly      hydroxychloroquine 200 MG tablet  Commonly known as:  PLAQUENIL   2 Times Daily      metoprolol tartrate 100 MG tablet  Commonly known as:  LOPRESSOR   TAKE 1 TABLET TWICE A DAY      omeprazole 40 MG capsule  Commonly known as:  priLOSEC   Daily      ondansetron 4 MG tablet  Commonly known as:  ZOFRAN   4 mg, Oral, As Needed      ORTHOVISC 30 MG/2ML solution prefilled syringe injection  Generic drug:  Hyaluronan   2 mL, Intra-articular      potassium chloride 10 MEQ CR tablet  Commonly known as:  K-DUR   10 mEq, Daily      pravastatin 40 MG tablet  Commonly known as:  PRAVACHOL   40 mg, Oral, Daily      PROLIA 60 MG/ML solution prefilled syringe syringe  Generic drug:  denosumab   PROLIA 60 MG/ML SOSY      levothyroxine 100 MCG tablet  Commonly known as:  SYNTHROID, LEVOTHROID    100 mcg, Oral, Daily      SYNTHROID 100 MCG tablet  Generic drug:  levothyroxine   100 mcg, Daily      ULORIC 40 MG tablet  Generic drug:  febuxostat   Daily      valsartan 160 MG tablet  Commonly known as:  DIOVAN   160 mg, Daily      vitamin C 500 MG tablet  Commonly known as:  ASCORBIC ACID   500 mg, Oral, Daily      Vitamin D3 2000 units capsule   VITAMIN D3 2000 UNIT CAPS      XARELTO 20 MG tablet  Generic drug:  rivaroxaban   No dose, route, or frequency recorded.         Stop These Medications    erythromycin base 250 MG tablet  Commonly known as:  E-MYCIN     neomycin 500 MG tablet  Commonly known as:  MYCIFRADIN            Discharge Diet:   Diet Instructions     Diet:      Diet Texture / Consistency:  Bariatric    Select stage:  Stage 1 Clear Liq. Sugar Free (no carbonation, no straw)          Activity at Discharge:   Activity Instructions     Discharge Activity      1) No driving for 2 week and no longer taking narcotics.   2) Return to school / work in 3 week.  3) May shower today.   4) Do not lift / push / pull more then 20 lbs.          Follow-up Appointments  Future Appointments   Date Time Provider Department Center   9/24/2019  3:30 PM Shola Rangel MD [facility] None   9/24/2019  3:30 PM LAB MD  [facility] NA  [facility] None   6/11/2020  3:00 PM Trever Mtz MD [facility] NA     Additional Instructions for the Follow-ups that You Need to Schedule     Ambulatory Referral to Home Health   As directed      Face to Face Visit Date:  9/9/2019    Follow-up provider for Plan of Care?:  I treated the patient in an acute care facility and will not continue treatment after discharge.    Follow-up provider:  JOSEPH GRAJEDA [[id number]]    Reason/Clinical Findings:  weakness and disease process    Describe mobility limitations that make leaving home difficult:  cant leave home without taxing effort    Nursing/Therapeutic Services Requested:  Other (treat and eval)    Frequency:  1 Week 1          Notify Physician or Go To The ED For the Following Conditions   As directed      Increasing abd pain and nausea.    Order Comments:  Increasing abd pain and nausea.                Test Results Pending at Discharge       Phyllis Martinez MD  09/11/19  11:16 AM    Time:

## 2019-09-12 ENCOUNTER — READMISSION MANAGEMENT (OUTPATIENT)
Dept: CALL CENTER | Facility: HOSPITAL | Age: 79
End: 2019-09-12

## 2019-09-12 NOTE — PROGRESS NOTES
Case Management Discharge Note    Final Note: home with [facility]            Final Discharge Disposition Code: 06 - home with home health care

## 2019-09-12 NOTE — OUTREACH NOTE
Prep Survey      Responses   Facility patient discharged from?  [facility]   Is patient eligible?  Yes   Discharge diagnosis  MALIGNANT NEOPLASM OF ASCENDING COLON   Does the patient have one of the following disease processes/diagnoses(primary or secondary)?  General Surgery   Does the patient have Home health ordered?  Yes   What is the Home health agency?   [facility]   Is there a DME ordered?  Yes   What DME was ordered?  3 IN 1 BSC   Medication alerts for this patient  ZOFRAN ODT   General alerts for this patient  STOP EMYCIN AND NEOMYCIN   Prep survey completed?  Yes          Angelica Quiroz LPN

## 2019-09-13 ENCOUNTER — READMISSION MANAGEMENT (OUTPATIENT)
Dept: CALL CENTER | Facility: HOSPITAL | Age: 79
End: 2019-09-13

## 2019-09-13 NOTE — OUTREACH NOTE
General Surgery Week 1 Survey      Responses   Facility patient discharged from?  [facility]   Does the patient have one of the following disease processes/diagnoses(primary or secondary)?  General Surgery   Is there a successful TCM telephone encounter documented?  No   Week 1 attempt successful?  Yes   Call start time  0852   Call end time  0855   General alerts for this patient  STOP EMYCIN AND NEOMYCIN   Discharge diagnosis  MALIGNANT NEOPLASM OF ASCENDING COLON   Person spoke with today (if not patient) and relationship  Daughter/Orly   Meds reviewed with patient/caregiver?  Yes   Is the patient having any side effects they believe may be caused by any medication additions or changes?  No   Does the patient have all medications related to this admission filled (includes all antibiotics, pain medications, etc.)  Yes   Is the patient taking all medications as directed (includes completed medication regime)?  Yes   Medication comments  Stated they had to contact her PCP regarding needing medication for her diarrhea   Does the patient have a follow up appointment scheduled with their surgeon?  Yes   Has the patient kept scheduled appointments due by today?  N/A   Comments  patient has appt with Surgeon and her PCP next week   What is the Home health agency?   [facility]   Has home health visited the patient within 72 hours of discharge?  Yes   Did the patient receive a copy of their discharge instructions?  Yes   Nursing interventions  Reviewed instructions with patient   What is the patient's perception of their health status since discharge?  Improving   Nursing interventions  Nurse provided patient education   Is the patient /caregiver able to teach back basic post-op care?  Practice 'cough and deep breath', Drive as instructed by MD in discharge instructions, Take showers only when approved by MD-sponge bathe until then, No tub bath, swimming, or hot tub until instructed by MD, Keep incision areas clean,dry and  protected, Do not remove steri-strips, Lifting as instructed by MD in discharge instructions   Is the patient/caregiver able to teach back signs and symptoms of incisional infection?  Increased redness, swelling or pain at the incisonal site, Increased drainage or bleeding, Incisional warmth, Pus or odor from incision, Fever   Is the patient/caregiver able to teach back steps to recovery at home?  Set small, achievable goals for return to baseline health, Rest and rebuild strength, gradually increase activity, Eat a well-balance diet, Make a list of questions for surgeon's appointment   Is the patient/caregiver able to teach back the hierarchy of who to call/visit for symptoms/problems? PCP, Specialist, Home health nurse, Urgent Care, ED, 911  Yes   Additional teach back comments  Daughter states she is doing well.  Home health came yesterday and walked her.     Week 1 call completed?  Yes   Wrap up additional comments  No questions or needs at this time          Ольга Benavidez LPN

## 2019-09-16 ENCOUNTER — LAB REQUISITION (OUTPATIENT)
Dept: LAB | Facility: HOSPITAL | Age: 79
End: 2019-09-16

## 2019-09-16 DIAGNOSIS — E87.6 HYPOKALEMIA: ICD-10-CM

## 2019-09-16 LAB — POTASSIUM BLD-SCNC: 3.7 MMOL/L (ref 3.6–5.1)

## 2019-09-16 PROCEDURE — 84132 ASSAY OF SERUM POTASSIUM: CPT | Performed by: SURGERY

## 2019-09-19 ENCOUNTER — OFFICE VISIT (OUTPATIENT)
Dept: SURGERY | Facility: CLINIC | Age: 79
End: 2019-09-19

## 2019-09-19 VITALS
TEMPERATURE: 97.5 F | DIASTOLIC BLOOD PRESSURE: 67 MMHG | WEIGHT: 270 LBS | OXYGEN SATURATION: 99 % | HEART RATE: 65 BPM | SYSTOLIC BLOOD PRESSURE: 134 MMHG | HEIGHT: 63 IN | BODY MASS INDEX: 47.84 KG/M2

## 2019-09-19 DIAGNOSIS — C18.2 CARCINOMA OF ASCENDING COLON (HCC): Primary | ICD-10-CM

## 2019-09-19 PROCEDURE — 99024 POSTOP FOLLOW-UP VISIT: CPT | Performed by: SURGERY

## 2019-09-19 RX ORDER — MONTELUKAST SODIUM 4 MG/1
2 TABLET, CHEWABLE ORAL 2 TIMES DAILY
Refills: 1 | COMMUNITY
Start: 2019-09-12

## 2019-09-19 NOTE — PROGRESS NOTES
SUBJECTIVE:    Yasmeen is seen in the office today in follow-up from her open right hemicolectomy 2 weeks ago today.  General she is doing well.  She has a problem with loose stool even before surgery and that was exacerbated after surgery.  Was started on WelChol by Dr. Rodriguez.  That is helped.  Saw him in the this yesterday.    OBJECTIVE:    Incision is healing appropriately without infection.  Staples are removed and Mastisol and Steri-Strips are applied    ASSESSMENT:    Continued improvement    PLAN:    Recheck in the office in 1 month.  Her path report showed all nodes were negative margins were negative she is a stage II.  She sees Dr. Rangel next week in follow-up.

## 2019-09-20 ENCOUNTER — READMISSION MANAGEMENT (OUTPATIENT)
Dept: CALL CENTER | Facility: HOSPITAL | Age: 79
End: 2019-09-20

## 2019-09-20 NOTE — PROGRESS NOTES
Hematology/Oncology Outpatient Follow Up    PATIENT NAME:Yasmeen Rahman  :1940  MRN: [id number]  PRIMARY CARE PHYSICIAN: Alex Rodriguez MD  REFERRING PHYSICIAN: Alex Rodriguez MD    Chief Complaint   Patient presents with   • Follow-up     for adenocarcinoma of ascending colon     HISTORY OF PRESENT ILLNESS:   1.  Stage I adenocarcinoma of the ascending colon diagnosed 2019  · This is  female who claims to have been anemic on and off for a few years for which she had been prescribed iron in the past.  She had a normal CBC on 2018 but then saw Dr. Figueredo for a bladder lesion and was found to be anemic in the fall of .  She was experiencing increasing shortness of air on exertion but thought this was related to her underlying lung disease.  She also had chronic diarrhea with no blood noticed in the stool.  She was started on iron supplementation and vitamin B12 supplementation.  Her stool turned black but she was not having any nausea, vomiting, diarrhea or constipation.  She was not having any abdominal pain and had not lost any weight.  She was referred to Aayush Maurer MD and underwent work-up on 2019 including a comprehensive metabolic panel that was normal.  CBC revealed WBC 6.4, hemoglobin 10.7, MCV 84.8, RDW 16.5, platelet count 232,000, CEA  0.7 (less than 2.5).  EGD and colonoscopy was performed that day revealing 5 to 10 mm polyps in the stomach body and fundus.  Granularity, friability, erythema and congestion was present in the cardia.  3.5 cm mass in the mid ascending colon was present.  Lipoma was seen in the mid ascending colon.  4 to 6 mm polyps in the distal ascending colon and sigmoid colon were seen.  Moderate diverticulosis of the sigmoid colon, grade/stage I internal hemorrhoids and 8 mm polyp in the proximal ascending colon were seen.  Pathology on the stomach polypectomy revealed fundic gland polyp negative for dysplasia, cardia biopsy  revealed body/fundic type mucosa with mild chronic gastritis, distal ascending colon polypectomy specimen had tubular adenoma, sigmoid colon polypectomy revealed hyperplastic polyp and mass in the mid ascending colon contained invasive moderately differentiated adenocarcinoma.  The patient was then referred to Dominick Cunha DO and myself and has been seen by Dr. Cunha with plans of open right colectomy after cardiac clearance.  · 8/20/19 - Patient seen in intial consultation on referral from Dr. Arnold for colon cancer.   · 8/27/19 - WBC 7.40, hemoglobin 11.8 and platelet count 241,000.  CMP with no significant abnormality.  CEA 1.11 (0-3).  Chest x-ray with cardiomegaly and no active cardiopulmonary disease.  · 9/5/2019 patient underwent open right hemicolectomy by Dominick Cunha DO with pathology revealing invasive moderately differentiated adenocarcinoma (1.8 cm) with invasion into the muscularis propria (pT2).  Margin of excision was free of tumor.  Vermiform appendix with a obliterated lumen, otherwise unremarkable.  13 benign reactive lymph nodes, negative for metastatic tumor (pN0) were excised.  · 9/24/2019 patient seen in follow-up of surgery and no adjuvant therapy recommended.  2.  Anemia diagnosed August 2019  · 8/7/2019  hemoglobin 10.7, MCV 84.8, RDW 16.5.  Patient on ferrous sulfate 325 mg p.o. Daily.  · 9/24/2019 hemoglobin 10.9.  Patient asked to increase ferrous sulfate to 325 mg p.o. twice daily.    Past Medical History:   Diagnosis Date   • Anemia 2018   • Anxiety 2018   • Arthritis    • Atrial fibrillation (CMS/HCC)    • CAD (coronary artery disease) 2013   • CKD (chronic kidney disease) 2019   • Colon cancer (CMS/HCC)    • GERD (gastroesophageal reflux disease) 2009   • Gout 2009   • High cholesterol 1989   • Hypertension 1989   • Hypothyroidism 2004   • Lupus    • PHOENIX (obstructive sleep apnea)    • Osteopenia 2015   • Osteoporosis 2004   • PONV (postoperative nausea and vomiting)    •  Rheumatoid arthritis (CMS/HCC) 2009       Past Surgical History:   Procedure Laterality Date   • CARDIAC CATHETERIZATION      no stents   • CHOLECYSTECTOMY  1990    laparoscopic    • COLON RESECTION Right 9/5/2019    Procedure: Open right hemicolectomy;  Surgeon: Dominick Cunha DO;  Location: [facility] MAIN OR;  Service: General   • COLONOSCOPY     • HEEL SPUR EXCISION Right 1994   • HYSTERECTOMY  1994    total due to heavy beeding and fibroids   • JOINT REPLACEMENT     • TONSILLECTOMY  1962   • TOTAL HIP ARTHROPLASTY Right 2010         Current Outpatient Medications:   •  acetaminophen (TYLENOL 8 HOUR ARTHRITIS PAIN) 650 MG 8 hr tablet, Take 650 mg by mouth Every 8 (Eight) Hours As Needed for Mild Pain ., Disp: , Rfl:   •  amLODIPine (NORVASC) 10 MG tablet, Daily., Disp: , Rfl:   •  aspirin 81 MG tablet, Take 81 mg by mouth Daily., Disp: , Rfl:   •  b complex vitamins tablet, B COMPLEX TABS, Disp: , Rfl:   •  bumetanide (BUMEX) 2 MG tablet, Take 2 mg by mouth Daily., Disp: , Rfl:   •  Calcium Carbonate-Vitamin D (CALCIUM 600+D) 600-200 MG-UNIT tablet, Take 1,200 mg by mouth Daily., Disp: , Rfl:   •  Cholecalciferol (VITAMIN D3) 2000 units capsule, VITAMIN D3 2000 UNIT CAPS, Disp: , Rfl:   •  CloNIDine (CATAPRES) 0.1 MG tablet, TAKE 1 TABLET TWICE A DAY, Disp: , Rfl:   •  colestipol (COLESTID) 1 g tablet, Take 2 g by mouth 2 (Two) Times a Day., Disp: , Rfl: 1  •  Cranberry 125 MG tablet, Take 500 mg by mouth., Disp: , Rfl:   •  cyanocobalamin (VITAMIN B-12) 2500 MCG tablet tablet, 1000 mcg, Disp: , Rfl:   •  denosumab (PROLIA) 60 MG/ML solution prefilled syringe syringe, PROLIA 60 MG/ML SOSY, Disp: , Rfl:   •  escitalopram (LEXAPRO) 10 MG tablet, Take 10 mg by mouth Daily., Disp: , Rfl:   •  ferrous sulfate 325 (65 FE) MG tablet, TAKE 1 TABLET BY MOUTh DAILY WITH MEALS, Disp: , Rfl:   •  gabapentin (NEURONTIN) 100 MG capsule, Take 200 mg by mouth Every Morning., Disp: , Rfl:   •  gabapentin (NEURONTIN) 300 MG  capsule, Take 300 mg by mouth Every Night., Disp: , Rfl:   •  levothyroxine (SYNTHROID, LEVOTHROID) 100 MCG tablet, Take 100 mcg by mouth Daily., Disp: , Rfl:   •  metoprolol tartrate (LOPRESSOR) 100 MG tablet, TAKE 1 TABLET TWICE A DAY, Disp: , Rfl:   •  omeprazole (priLOSEC) 40 MG capsule, Daily., Disp: , Rfl:   •  potassium chloride (K-DUR) 10 MEQ CR tablet, 10 mEq Daily., Disp: , Rfl:   •  pravastatin (PRAVACHOL) 40 MG tablet, Take 40 mg by mouth Daily., Disp: , Rfl:   •  valsartan (DIOVAN) 160 MG tablet, 160 mg Daily., Disp: , Rfl:   •  vitamin C (ASCORBIC ACID) 500 MG tablet, Take 500 mg by mouth Daily., Disp: , Rfl:   •  XARELTO 20 MG tablet, , Disp: , Rfl:   •  Hyaluronan (ORTHOVISC) 30 MG/2ML solution prefilled syringe injection, Inject 2 mL into the appropriate joint as directed by provider., Disp: , Rfl:   •  ondansetron ODT (ZOFRAN ODT) 8 MG disintegrating tablet, Take 1 tablet by mouth Every 8 (Eight) Hours As Needed for Nausea or Vomiting., Disp: 30 tablet, Rfl: 5    Allergies   Allergen Reactions   • Codeine Nausea And Vomiting   • Hydrocodone-Acetaminophen Nausea And Vomiting     Nausea,vomiting       Family History   Problem Relation Age of Onset   • Colon cancer Brother 71       Cancer-related family history includes Colon cancer (age of onset: 71) in her brother.    Social History     Tobacco Use   • Smoking status: Never Smoker   • Smokeless tobacco: Never Used   Substance Use Topics   • Alcohol use: No     Frequency: Never   • Drug use: No       I have reviewed the history of present illness, past medical history, family history, social history, lab results, all notes and other records since the patient was last seen on 8/20/19.    SUBJECTIVE: Patient is here for follow up of adenocarcinoma of the ascending colon and GIOVANNA. Reports that she did okay with the surgery. Taking one iron pill daily.     Female accompanied patient today.   MELISSA Hickey present during office visit.        REVIEW  "OF SYSTEMS:  Review of Systems   Constitutional: Negative for chills and fever.   HENT: Negative for ear pain, mouth sores, nosebleeds and sore throat.    Eyes: Negative for photophobia and visual disturbance.   Respiratory: Negative for wheezing and stridor.    Cardiovascular: Negative for chest pain and palpitations.   Gastrointestinal: Negative for abdominal pain, diarrhea, nausea and vomiting.   Endocrine: Negative for cold intolerance and heat intolerance.   Genitourinary: Negative for dysuria and hematuria.   Musculoskeletal: Negative for joint swelling and neck stiffness.   Skin: Negative for color change and rash.   Neurological: Negative for seizures and syncope.   Hematological: Negative for adenopathy.        No obvious bleeding   Psychiatric/Behavioral: Negative for agitation, confusion and hallucinations.       OBJECTIVE:    Vitals:    09/24/19 1548   Resp: 18   Weight: 124 kg (273 lb 9.6 oz)   Height: 167.6 cm (66\")   PainSc: 0-No pain       ECOG  (1) Restricted in physically strenuous activity, ambulatory and able to do work of light nature    Physical Exam   Constitutional: She is oriented to person, place, and time. No distress.   HENT:   Head: Normocephalic and atraumatic.   Dental fillings.    Eyes: Conjunctivae and EOM are normal. Right eye exhibits no discharge. Left eye exhibits no discharge. No scleral icterus.   Eyeglass.    Neck: Normal range of motion. Neck supple. No thyromegaly present.   Cardiovascular: Normal rate, regular rhythm and normal heart sounds. Exam reveals no gallop and no friction rub.   Pulmonary/Chest: Effort normal. No stridor. No respiratory distress. She has no wheezes.   Abdominal: Soft. Bowel sounds are normal. She exhibits no mass. There is no tenderness. There is no rebound and no guarding.   Obese. 10 cm vertical scar above and below the umbilicus with steri strips present.     Musculoskeletal: Normal range of motion. She exhibits no tenderness.   She is using a " walker for ambulation. 1+ edema bilateral lower extremities.    Lymphadenopathy:     She has no cervical adenopathy.   Neurological: She is alert and oriented to person, place, and time. She exhibits normal muscle tone.   Skin: Skin is warm. No rash noted. She is not diaphoretic. No erythema.   Skin is pale.    Psychiatric: She has a normal mood and affect. Her behavior is normal.   Nursing note and vitals reviewed.      RECENT LABS  WBC   Date Value Ref Range Status   09/23/2019 5.00 3.40 - 10.80 10*3/mm3 Final     RBC   Date Value Ref Range Status   09/23/2019 3.79 3.77 - 5.28 10*6/mm3 Final     Hemoglobin   Date Value Ref Range Status   09/23/2019 10.9 (L) 12.0 - 15.9 g/dL Final     Hematocrit   Date Value Ref Range Status   09/23/2019 32.5 (L) 34.0 - 46.6 % Final     MCV   Date Value Ref Range Status   09/23/2019 85.8 79.0 - 97.0 fL Final     MCH   Date Value Ref Range Status   09/23/2019 28.7 26.6 - 33.0 pg Final     MCHC   Date Value Ref Range Status   09/23/2019 33.4 31.5 - 35.7 g/dL Final     RDW   Date Value Ref Range Status   09/23/2019 15.2 12.3 - 15.4 % Final     RDW-SD   Date Value Ref Range Status   09/23/2019 45.5 37.0 - 54.0 fl Final     MPV   Date Value Ref Range Status   09/23/2019 7.3 6.0 - 12.0 fL Final     Platelets   Date Value Ref Range Status   09/23/2019 321 140 - 450 10*3/mm3 Final     Neutrophil %   Date Value Ref Range Status   09/23/2019 75.4 42.7 - 76.0 % Final     Lymphocyte %   Date Value Ref Range Status   09/23/2019 11.2 (L) 19.6 - 45.3 % Final     Monocyte %   Date Value Ref Range Status   09/23/2019 9.7 5.0 - 12.0 % Final     Eosinophil %   Date Value Ref Range Status   09/23/2019 3.5 0.3 - 6.2 % Final     Basophil %   Date Value Ref Range Status   09/23/2019 0.2 0.0 - 1.5 % Final     Neutrophils, Absolute   Date Value Ref Range Status   09/23/2019 3.80 1.70 - 7.00 10*3/mm3 Final     Lymphocytes, Absolute   Date Value Ref Range Status   09/23/2019 0.60 (L) 0.70 - 3.10 10*3/mm3  Final     Monocytes, Absolute   Date Value Ref Range Status   09/23/2019 0.50 0.10 - 0.90 10*3/mm3 Final     Eosinophils, Absolute   Date Value Ref Range Status   09/23/2019 0.20 0.00 - 0.40 10*3/mm3 Final     Basophils, Absolute   Date Value Ref Range Status   09/23/2019 0.00 0.00 - 0.20 10*3/mm3 Final     nRBC   Date Value Ref Range Status   09/23/2019 0.0 0.0 - 0.2 /100 WBC Final       Lab Results   Component Value Date    GLUCOSE 102 (H) 09/23/2019    BUN 10 09/23/2019    CREATININE 0.80 09/23/2019    EGFRIFNONA 69 09/23/2019    EGFRIFAFRI 71 03/06/2019    BCR 12.5 09/23/2019    K 3.5 (L) 09/23/2019    CO2 25.0 09/23/2019    CALCIUM 8.0 (L) 09/23/2019    ALBUMIN 2.60 (L) 09/11/2019    LABIL2 1.4 05/17/2019    AST 54 (H) 09/11/2019    ALT 52 09/11/2019         Assessment/Plan     Invasive moderately differentiated adenocarcinoma ascending colon   - CBC & Differential  - CBC & Differential  - CBC Auto Differential    Iron deficiency anemia due to chronic blood loss    Liver mass    Splenic mass    Chronic atrial fibrillation (CMS/HCC)    Chronic kidney disease (CKD), stage IV (severe) (CMS/HCC)      ASSESSMENT:  Discussed the presence of stage I colon cancer with the patient and recommended no adjuvant chemotherapy.  She is quite anemic in spite of her taking 1 iron pill daily and have asked her to increase iron pill to twice a day.  If this does not improve her hemoglobin, she may need further anemia work-up, though chronic kidney disease may be contributing also.  Have discussed the need for follow-up CEA, CTs and colonoscopy.    PLAN:  Increase oral iron to BID.         I have reviewed lab results, imaging, vitals, and medications with the patient today. Will follow up in one month.     Patient verbalized understanding and is in agreement of the above plan.    I have reviewed and agree with the above information.   Shola Rangel M.D., F.A.C.P.     Much of the above report is an electronic  transcription/translation of the spoken language to printed text using Dragon Software. As such, the subtleties and finesse of the spoken language may permit erroneous, or at times, nonsensical words or phrases to be inadvertently transcribed; thus changes may be made at a later date to rectify these errors.

## 2019-09-20 NOTE — OUTREACH NOTE
General Surgery Week 2 Survey      Responses   Facility patient discharged from?  [facility]   Does the patient have one of the following disease processes/diagnoses(primary or secondary)?  General Surgery   Week 2 attempt successful?  Yes   Call start time  1252   Call end time  1255   Meds reviewed with patient/caregiver?  Yes   Is the patient having any side effects they believe may be caused by any medication additions or changes?  No   Does the patient have all medications related to this admission filled (includes all antibiotics, pain medications, etc.)  Yes   Is the patient taking all medications as directed (includes completed medication regime)?  Yes   Does the patient have a follow up appointment scheduled with their surgeon?  Yes   Has the patient kept scheduled appointments due by today?  Yes   What is the Home health agency?   [facility]   Has home health visited the patient within 72 hours of discharge?  Yes   Did the patient receive a copy of their discharge instructions?  Yes   Nursing interventions  Reviewed instructions with patient   What is the patient's perception of their health status since discharge?  Improving   Nursing interventions  Nurse provided patient education   Is the patient /caregiver able to teach back basic post-op care?  Take showers only when approved by MD-sponge bathe until then, Keep incision areas clean,dry and protected, Do not remove steri-strips, Lifting as instructed by MD in discharge instructions, Drive as instructed by MD in discharge instructions, No tub bath, swimming, or hot tub until instructed by MD, Practice 'cough and deep breath'   Is the patient/caregiver able to teach back signs and symptoms of incisional infection?  Increased redness, swelling or pain at the incisonal site, Increased drainage or bleeding, Incisional warmth, Pus or odor from incision, Fever   Is the patient/caregiver able to teach back steps to recovery at home?  Set small, achievable goals for  return to baseline health, Rest and rebuild strength, gradually increase activity, Make a list of questions for surgeon's appointment   Is the patient/caregiver able to teach back the hierarchy of who to call/visit for symptoms/problems? PCP, Specialist, Home health nurse, Urgent Care, ED, 911  Yes   Week 2 call completed?  Yes   Revoked  No further contact(revokes)-requires comment          Angelica Quiroz LPN

## 2019-09-23 ENCOUNTER — LAB REQUISITION (OUTPATIENT)
Dept: LAB | Facility: HOSPITAL | Age: 79
End: 2019-09-23

## 2019-09-23 DIAGNOSIS — E87.6 HYPOKALEMIA: ICD-10-CM

## 2019-09-23 DIAGNOSIS — D64.9 ANEMIA: ICD-10-CM

## 2019-09-23 LAB
ANION GAP SERPL CALCULATED.3IONS-SCNC: 15.5 MMOL/L (ref 5–15)
BASOPHILS # BLD AUTO: 0 10*3/MM3 (ref 0–0.2)
BASOPHILS NFR BLD AUTO: 0.2 % (ref 0–1.5)
BUN BLD-MCNC: 10 MG/DL (ref 8–20)
BUN/CREAT SERPL: 12.5 (ref 5.4–26.2)
CALCIUM SPEC-SCNC: 8 MG/DL (ref 8.9–10.3)
CHLORIDE SERPL-SCNC: 97 MMOL/L (ref 101–111)
CO2 SERPL-SCNC: 25 MMOL/L (ref 22–32)
CREAT BLD-MCNC: 0.8 MG/DL (ref 0.4–1)
DEPRECATED RDW RBC AUTO: 45.5 FL (ref 37–54)
EOSINOPHIL # BLD AUTO: 0.2 10*3/MM3 (ref 0–0.4)
EOSINOPHIL NFR BLD AUTO: 3.5 % (ref 0.3–6.2)
ERYTHROCYTE [DISTWIDTH] IN BLOOD BY AUTOMATED COUNT: 15.2 % (ref 12.3–15.4)
GFR SERPL CREATININE-BSD FRML MDRD: 69 ML/MIN/1.73
GLUCOSE BLD-MCNC: 102 MG/DL (ref 65–99)
HCT VFR BLD AUTO: 32.5 % (ref 34–46.6)
HGB BLD-MCNC: 10.9 G/DL (ref 12–15.9)
LYMPHOCYTES # BLD AUTO: 0.6 10*3/MM3 (ref 0.7–3.1)
LYMPHOCYTES NFR BLD AUTO: 11.2 % (ref 19.6–45.3)
MCH RBC QN AUTO: 28.7 PG (ref 26.6–33)
MCHC RBC AUTO-ENTMCNC: 33.4 G/DL (ref 31.5–35.7)
MCV RBC AUTO: 85.8 FL (ref 79–97)
MONOCYTES # BLD AUTO: 0.5 10*3/MM3 (ref 0.1–0.9)
MONOCYTES NFR BLD AUTO: 9.7 % (ref 5–12)
NEUTROPHILS # BLD AUTO: 3.8 10*3/MM3 (ref 1.7–7)
NEUTROPHILS NFR BLD AUTO: 75.4 % (ref 42.7–76)
NRBC BLD AUTO-RTO: 0 /100 WBC (ref 0–0.2)
PLATELET # BLD AUTO: 321 10*3/MM3 (ref 140–450)
PMV BLD AUTO: 7.3 FL (ref 6–12)
POTASSIUM BLD-SCNC: 3.5 MMOL/L (ref 3.6–5.1)
RBC # BLD AUTO: 3.79 10*6/MM3 (ref 3.77–5.28)
SODIUM BLD-SCNC: 134 MMOL/L (ref 136–144)
WBC NRBC COR # BLD: 5 10*3/MM3 (ref 3.4–10.8)

## 2019-09-23 PROCEDURE — 85025 COMPLETE CBC W/AUTO DIFF WBC: CPT | Performed by: FAMILY MEDICINE

## 2019-09-23 PROCEDURE — 80048 BASIC METABOLIC PNL TOTAL CA: CPT | Performed by: FAMILY MEDICINE

## 2019-09-24 ENCOUNTER — OFFICE VISIT (OUTPATIENT)
Dept: ONCOLOGY | Facility: CLINIC | Age: 79
End: 2019-09-24

## 2019-09-24 ENCOUNTER — APPOINTMENT (OUTPATIENT)
Dept: LAB | Facility: HOSPITAL | Age: 79
End: 2019-09-24

## 2019-09-24 VITALS — WEIGHT: 273.6 LBS | RESPIRATION RATE: 18 BRPM | BODY MASS INDEX: 43.97 KG/M2 | HEIGHT: 66 IN

## 2019-09-24 DIAGNOSIS — N18.4 CHRONIC KIDNEY DISEASE (CKD), STAGE IV (SEVERE) (HCC): ICD-10-CM

## 2019-09-24 DIAGNOSIS — C18.9 ADENOCARCINOMA, COLON (HCC): Primary | ICD-10-CM

## 2019-09-24 DIAGNOSIS — R16.1 SPLENIC MASS: ICD-10-CM

## 2019-09-24 DIAGNOSIS — D50.0 IRON DEFICIENCY ANEMIA DUE TO CHRONIC BLOOD LOSS: ICD-10-CM

## 2019-09-24 DIAGNOSIS — I48.20 CHRONIC ATRIAL FIBRILLATION (HCC): ICD-10-CM

## 2019-09-24 DIAGNOSIS — R16.0 LIVER MASS: ICD-10-CM

## 2019-09-24 PROCEDURE — G0463 HOSPITAL OUTPT CLINIC VISIT: HCPCS | Performed by: INTERNAL MEDICINE

## 2019-09-24 PROCEDURE — 99214 OFFICE O/P EST MOD 30 MIN: CPT | Performed by: INTERNAL MEDICINE

## 2019-09-24 RX ORDER — SENNOSIDES 8.6 MG
650 CAPSULE ORAL EVERY 8 HOURS PRN
COMMUNITY
End: 2021-07-19

## 2019-10-21 ENCOUNTER — OFFICE VISIT (OUTPATIENT)
Dept: SURGERY | Facility: CLINIC | Age: 79
End: 2019-10-21

## 2019-10-21 ENCOUNTER — LAB REQUISITION (OUTPATIENT)
Dept: LAB | Facility: HOSPITAL | Age: 79
End: 2019-10-21

## 2019-10-21 VITALS
TEMPERATURE: 97.2 F | SYSTOLIC BLOOD PRESSURE: 133 MMHG | HEIGHT: 66 IN | BODY MASS INDEX: 45 KG/M2 | HEART RATE: 76 BPM | WEIGHT: 280 LBS | OXYGEN SATURATION: 97 % | DIASTOLIC BLOOD PRESSURE: 78 MMHG

## 2019-10-21 DIAGNOSIS — I10 ESSENTIAL (PRIMARY) HYPERTENSION: ICD-10-CM

## 2019-10-21 DIAGNOSIS — Z19.1 HORMONE SENSITIVE MALIGNANCY STATUS: ICD-10-CM

## 2019-10-21 DIAGNOSIS — I49.9 CARDIAC ARRHYTHMIA, UNSPECIFIED: ICD-10-CM

## 2019-10-21 DIAGNOSIS — C18.9 ADENOCARCINOMA, COLON (HCC): Primary | ICD-10-CM

## 2019-10-21 LAB
25(OH)D3 SERPL-MCNC: 52.1 NG/ML (ref 30–100)
ANION GAP SERPL CALCULATED.3IONS-SCNC: 13 MMOL/L (ref 5–15)
BASOPHILS # BLD AUTO: 0 10*3/MM3 (ref 0–0.2)
BASOPHILS NFR BLD AUTO: 0.3 % (ref 0–1.5)
BUN BLD-MCNC: 14 MG/DL (ref 8–23)
BUN/CREAT SERPL: 16.9 (ref 7–25)
CALCIUM SPEC-SCNC: 9 MG/DL (ref 8.6–10.5)
CHLORIDE SERPL-SCNC: 100 MMOL/L (ref 98–107)
CHROMATIN AB SERPL-ACNC: <10 IU/ML (ref 0–14)
CO2 SERPL-SCNC: 27 MMOL/L (ref 22–29)
CREAT BLD-MCNC: 0.83 MG/DL (ref 0.57–1)
CRP SERPL-MCNC: 0.42 MG/DL (ref 0–0.5)
DEPRECATED RDW RBC AUTO: 56.4 FL (ref 37–54)
EOSINOPHIL # BLD AUTO: 0.3 10*3/MM3 (ref 0–0.4)
EOSINOPHIL NFR BLD AUTO: 6.6 % (ref 0.3–6.2)
ERYTHROCYTE [DISTWIDTH] IN BLOOD BY AUTOMATED COUNT: 17.8 % (ref 12.3–15.4)
ERYTHROCYTE [SEDIMENTATION RATE] IN BLOOD: 41 MM/HR (ref 0–30)
GFR SERPL CREATININE-BSD FRML MDRD: 66 ML/MIN/1.73
GLUCOSE BLD-MCNC: 118 MG/DL (ref 65–99)
HCT VFR BLD AUTO: 30.1 % (ref 34–46.6)
HGB BLD-MCNC: 10 G/DL (ref 12–15.9)
LYMPHOCYTES # BLD AUTO: 0.9 10*3/MM3 (ref 0.7–3.1)
LYMPHOCYTES NFR BLD AUTO: 18.2 % (ref 19.6–45.3)
MCH RBC QN AUTO: 29.6 PG (ref 26.6–33)
MCHC RBC AUTO-ENTMCNC: 33.2 G/DL (ref 31.5–35.7)
MCV RBC AUTO: 89.1 FL (ref 79–97)
MONOCYTES # BLD AUTO: 0.5 10*3/MM3 (ref 0.1–0.9)
MONOCYTES NFR BLD AUTO: 10.4 % (ref 5–12)
NEUTROPHILS # BLD AUTO: 3.3 10*3/MM3 (ref 1.7–7)
NEUTROPHILS NFR BLD AUTO: 64.5 % (ref 42.7–76)
NRBC BLD AUTO-RTO: 0.1 /100 WBC (ref 0–0.2)
PLATELET # BLD AUTO: 228 10*3/MM3 (ref 140–450)
PMV BLD AUTO: 7.6 FL (ref 6–12)
POTASSIUM BLD-SCNC: 3.9 MMOL/L (ref 3.5–5.2)
RBC # BLD AUTO: 3.38 10*6/MM3 (ref 3.77–5.28)
SODIUM BLD-SCNC: 140 MMOL/L (ref 136–145)
WBC NRBC COR # BLD: 5.1 10*3/MM3 (ref 3.4–10.8)

## 2019-10-21 PROCEDURE — 86200 CCP ANTIBODY: CPT | Performed by: SURGERY

## 2019-10-21 PROCEDURE — 99024 POSTOP FOLLOW-UP VISIT: CPT | Performed by: SURGERY

## 2019-10-21 PROCEDURE — 82306 VITAMIN D 25 HYDROXY: CPT | Performed by: SURGERY

## 2019-10-21 PROCEDURE — 86431 RHEUMATOID FACTOR QUANT: CPT | Performed by: SURGERY

## 2019-10-21 PROCEDURE — 85652 RBC SED RATE AUTOMATED: CPT | Performed by: SURGERY

## 2019-10-21 PROCEDURE — 86140 C-REACTIVE PROTEIN: CPT | Performed by: SURGERY

## 2019-10-21 PROCEDURE — 85025 COMPLETE CBC W/AUTO DIFF WBC: CPT | Performed by: SURGERY

## 2019-10-21 PROCEDURE — 80048 BASIC METABOLIC PNL TOTAL CA: CPT | Performed by: SURGERY

## 2019-10-21 RX ORDER — HYDROXYCHLOROQUINE SULFATE 200 MG/1
200 TABLET, FILM COATED ORAL 2 TIMES DAILY
COMMUNITY
Start: 2019-10-17 | End: 2022-09-14

## 2019-10-21 NOTE — PROGRESS NOTES
SUBJECTIVE:    Melena seen in the office today follow-up from her open right hemicolectomy.  She is doing well.  No fevers or chills.  No nausea or vomiting.    OBJECTIVE:    Patient is healing appropriately without infection.    ASSESSMENT:    Satisfactory postop progress    PLAN:    Recheck in the office as needed

## 2019-10-22 LAB — CCP IGA+IGG SERPL IA-ACNC: 6 UNITS (ref 0–19)

## 2019-10-23 ENCOUNTER — APPOINTMENT (OUTPATIENT)
Dept: LAB | Facility: HOSPITAL | Age: 79
End: 2019-10-23

## 2019-10-23 ENCOUNTER — OFFICE VISIT (OUTPATIENT)
Dept: ONCOLOGY | Facility: CLINIC | Age: 79
End: 2019-10-23

## 2019-10-23 VITALS
HEART RATE: 73 BPM | BODY MASS INDEX: 44.87 KG/M2 | WEIGHT: 279.2 LBS | DIASTOLIC BLOOD PRESSURE: 76 MMHG | RESPIRATION RATE: 18 BRPM | TEMPERATURE: 97.4 F | SYSTOLIC BLOOD PRESSURE: 143 MMHG | HEIGHT: 66 IN

## 2019-10-23 DIAGNOSIS — C18.9 ADENOCARCINOMA, COLON (HCC): Primary | ICD-10-CM

## 2019-10-23 DIAGNOSIS — D50.0 IRON DEFICIENCY ANEMIA DUE TO CHRONIC BLOOD LOSS: ICD-10-CM

## 2019-10-23 DIAGNOSIS — M06.9 RHEUMATOID ARTHRITIS OF FOOT, UNSPECIFIED LATERALITY, UNSPECIFIED RHEUMATOID FACTOR PRESENCE: ICD-10-CM

## 2019-10-23 DIAGNOSIS — R16.0 LIVER MASS: ICD-10-CM

## 2019-10-23 DIAGNOSIS — R16.1 SPLENIC MASS: ICD-10-CM

## 2019-10-23 DIAGNOSIS — I48.20 CHRONIC ATRIAL FIBRILLATION (HCC): ICD-10-CM

## 2019-10-23 PROCEDURE — 82607 VITAMIN B-12: CPT | Performed by: INTERNAL MEDICINE

## 2019-10-23 PROCEDURE — 36415 COLL VENOUS BLD VENIPUNCTURE: CPT | Performed by: INTERNAL MEDICINE

## 2019-10-23 PROCEDURE — 85045 AUTOMATED RETICULOCYTE COUNT: CPT | Performed by: INTERNAL MEDICINE

## 2019-10-23 PROCEDURE — 99214 OFFICE O/P EST MOD 30 MIN: CPT | Performed by: INTERNAL MEDICINE

## 2019-10-23 PROCEDURE — 82728 ASSAY OF FERRITIN: CPT | Performed by: INTERNAL MEDICINE

## 2019-10-23 PROCEDURE — 83010 ASSAY OF HAPTOGLOBIN QUANT: CPT | Performed by: INTERNAL MEDICINE

## 2019-10-23 PROCEDURE — 82746 ASSAY OF FOLIC ACID SERUM: CPT | Performed by: INTERNAL MEDICINE

## 2019-10-23 PROCEDURE — 84466 ASSAY OF TRANSFERRIN: CPT | Performed by: INTERNAL MEDICINE

## 2019-10-23 PROCEDURE — 83540 ASSAY OF IRON: CPT | Performed by: INTERNAL MEDICINE

## 2019-10-24 LAB
ALBUMIN SERPL-MCNC: 3.5 G/DL (ref 2.9–4.4)
ALBUMIN/GLOB SERPL: 1.3 {RATIO} (ref 0.7–1.7)
ALPHA1 GLOB FLD ELPH-MCNC: 0.3 G/DL (ref 0–0.4)
ALPHA2 GLOB SERPL ELPH-MCNC: 0.8 G/DL (ref 0.4–1)
B-GLOBULIN SERPL ELPH-MCNC: 0.9 G/DL (ref 0.7–1.3)
ETHNIC BACKGROUND STATED: 56.5 MIU/ML (ref 2.6–18.5)
FERRITIN SERPL-MCNC: 40.52 NG/ML (ref 13–150)
FOLATE SERPL-MCNC: 18.4 NG/ML (ref 4.78–24.2)
GAMMA GLOB SERPL ELPH-MCNC: 0.7 G/DL (ref 0.4–1.8)
GLOBULIN SER CALC-MCNC: 2.7 G/DL (ref 2.2–3.9)
HAPTOGLOB SERPL-MCNC: 226 MG/DL (ref 30–200)
IRON 24H UR-MRATE: 31 MCG/DL (ref 37–145)
IRON SATN MFR SERPL: 7 % (ref 20–50)
Lab: NORMAL
M-SPIKE: NORMAL G/DL
PROT SERPL-MCNC: 6.2 G/DL (ref 6–8.5)
RETICS # AUTO: 0.07 10*6/MM3 (ref 0.02–0.13)
RETICS/RBC NFR AUTO: 2.04 % (ref 0.7–1.9)
TIBC SERPL-MCNC: 420 MCG/DL (ref 298–536)
TRANSFERRIN SERPL-MCNC: 282 MG/DL (ref 200–360)
VIT B12 BLD-MCNC: 1796 PG/ML (ref 211–946)

## 2019-10-25 PROBLEM — Z78.9 INTOLERANCE, DRUG: Status: ACTIVE | Noted: 2019-10-25

## 2019-10-25 LAB — COPPER SERPL-MCNC: 132 UG/DL (ref 72–166)

## 2019-10-25 NOTE — PROGRESS NOTES
Please notify the patient that her iron levels remain low and as she does not tolerate oral iron we will give her some IV iron for her anemia.  Please notify her to contact our office in 1 week if she has not heard from us regarding scheduling.    Injectafer 750mg IV days 1& 8 ordered for GIOVANNA with oral iron intolerance. YI Ovalle RN notified to add plan.

## 2019-10-31 ENCOUNTER — TELEPHONE (OUTPATIENT)
Dept: ONCOLOGY | Facility: CLINIC | Age: 79
End: 2019-10-31

## 2019-10-31 NOTE — TELEPHONE ENCOUNTER
Received call from pt's daughter, Orly wanting to know if pt's Injectafer has been approved.  Chart reviewed and approval received yesterday.  Pt scheduled and daughter v/u of dates and times.

## 2019-11-04 ENCOUNTER — HOSPITAL ENCOUNTER (OUTPATIENT)
Dept: ONCOLOGY | Facility: HOSPITAL | Age: 79
Setting detail: INFUSION SERIES
Discharge: HOME | End: 2019-11-04

## 2019-11-04 VITALS
HEIGHT: 66 IN | RESPIRATION RATE: 18 BRPM | WEIGHT: 277 LBS | BODY MASS INDEX: 44.52 KG/M2 | DIASTOLIC BLOOD PRESSURE: 70 MMHG | HEART RATE: 73 BPM | TEMPERATURE: 98.2 F | SYSTOLIC BLOOD PRESSURE: 135 MMHG

## 2019-11-04 DIAGNOSIS — D50.0 IRON DEFICIENCY ANEMIA DUE TO CHRONIC BLOOD LOSS: ICD-10-CM

## 2019-11-04 DIAGNOSIS — Z78.9 INTOLERANCE, DRUG: Primary | ICD-10-CM

## 2019-11-04 PROCEDURE — 36415 COLL VENOUS BLD VENIPUNCTURE: CPT | Performed by: INTERNAL MEDICINE

## 2019-11-04 PROCEDURE — 25010000002 FERRIC CARBOXYMALTOSE 750 MG/15ML SOLUTION 15 ML VIAL: Performed by: NURSE PRACTITIONER

## 2019-11-04 PROCEDURE — 96365 THER/PROPH/DIAG IV INF INIT: CPT | Performed by: INTERNAL MEDICINE

## 2019-11-04 RX ORDER — SODIUM CHLORIDE 9 MG/ML
250 INJECTION, SOLUTION INTRAVENOUS ONCE
Status: DISCONTINUED | OUTPATIENT
Start: 2019-11-04 | End: 2019-11-05 | Stop reason: HOSPADM

## 2019-11-04 RX ADMIN — FERRIC CARBOXYMALTOSE INJECTION 750 MG: 50 INJECTION, SOLUTION INTRAVENOUS at 15:52

## 2019-11-07 DIAGNOSIS — D50.0 IRON DEFICIENCY ANEMIA DUE TO CHRONIC BLOOD LOSS: ICD-10-CM

## 2019-11-07 DIAGNOSIS — Z78.9 INTOLERANCE, DRUG: ICD-10-CM

## 2019-11-07 RX ORDER — SODIUM CHLORIDE 9 MG/ML
250 INJECTION, SOLUTION INTRAVENOUS ONCE
Status: CANCELLED | OUTPATIENT
Start: 2019-11-11

## 2019-11-11 ENCOUNTER — HOSPITAL ENCOUNTER (OUTPATIENT)
Dept: ONCOLOGY | Facility: HOSPITAL | Age: 79
Setting detail: INFUSION SERIES
Discharge: HOME | End: 2019-11-11

## 2019-11-11 VITALS
HEART RATE: 88 BPM | SYSTOLIC BLOOD PRESSURE: 138 MMHG | HEIGHT: 66 IN | DIASTOLIC BLOOD PRESSURE: 84 MMHG | RESPIRATION RATE: 18 BRPM | BODY MASS INDEX: 43.87 KG/M2 | TEMPERATURE: 97.3 F | WEIGHT: 273 LBS

## 2019-11-11 DIAGNOSIS — Z78.9 INTOLERANCE, DRUG: Primary | ICD-10-CM

## 2019-11-11 DIAGNOSIS — D50.0 IRON DEFICIENCY ANEMIA DUE TO CHRONIC BLOOD LOSS: ICD-10-CM

## 2019-11-11 PROCEDURE — 96365 THER/PROPH/DIAG IV INF INIT: CPT | Performed by: INTERNAL MEDICINE

## 2019-11-11 PROCEDURE — 25010000002 FERRIC CARBOXYMALTOSE 750 MG/15ML SOLUTION 15 ML VIAL: Performed by: INTERNAL MEDICINE

## 2019-11-11 RX ORDER — SODIUM CHLORIDE 9 MG/ML
250 INJECTION, SOLUTION INTRAVENOUS ONCE
Status: DISCONTINUED | OUTPATIENT
Start: 2019-11-11 | End: 2019-11-12 | Stop reason: HOSPADM

## 2019-11-11 RX ADMIN — FERRIC CARBOXYMALTOSE INJECTION 750 MG: 50 INJECTION, SOLUTION INTRAVENOUS at 15:43

## 2019-11-12 ENCOUNTER — TELEPHONE (OUTPATIENT)
Dept: ONCOLOGY | Facility: CLINIC | Age: 79
End: 2019-11-12

## 2019-11-12 NOTE — TELEPHONE ENCOUNTER
Pt states that she went to see Dr Figueredo today and he did a UA, a CT scan and will see the pt again on Tuesday for a scope.

## 2019-11-12 NOTE — TELEPHONE ENCOUNTER
Received call from pt's daughter stating that pt had her first Injectafer infusion yesterday and last night she started having urinary frequency and hematuria.  She wants to know if this is caused by the iron and if she should contact her PCP or us.  Please advise.

## 2019-11-12 NOTE — TELEPHONE ENCOUNTER
That is definitely not a side effect of Injectafer.  She should have a UA.  She can do that here or with her PCP.

## 2019-11-26 ENCOUNTER — APPOINTMENT (OUTPATIENT)
Dept: ONCOLOGY | Facility: CLINIC | Age: 79
End: 2019-11-26

## 2019-11-26 ENCOUNTER — APPOINTMENT (OUTPATIENT)
Dept: LAB | Facility: HOSPITAL | Age: 79
End: 2019-11-26

## 2019-12-02 ENCOUNTER — TRANSCRIBE ORDERS (OUTPATIENT)
Dept: ADMINISTRATIVE | Facility: HOSPITAL | Age: 79
End: 2019-12-02

## 2019-12-02 ENCOUNTER — LAB (OUTPATIENT)
Dept: LAB | Facility: HOSPITAL | Age: 79
End: 2019-12-02

## 2019-12-02 ENCOUNTER — HOSPITAL ENCOUNTER (OUTPATIENT)
Dept: CARDIOLOGY | Facility: HOSPITAL | Age: 79
Discharge: HOME | End: 2019-12-02
Admitting: UROLOGY

## 2019-12-02 DIAGNOSIS — N32.9 DISEASE OF BLADDER: ICD-10-CM

## 2019-12-02 DIAGNOSIS — N32.9 DISEASE OF BLADDER: Primary | ICD-10-CM

## 2019-12-02 LAB
ANION GAP SERPL CALCULATED.3IONS-SCNC: 13.2 MMOL/L (ref 5–15)
BASOPHILS # BLD AUTO: 0.02 10*3/MM3 (ref 0–0.2)
BASOPHILS NFR BLD AUTO: 0.3 % (ref 0–1.5)
BUN BLD-MCNC: 19 MG/DL (ref 8–23)
BUN/CREAT SERPL: 18.1 (ref 7–25)
CALCIUM SPEC-SCNC: 9 MG/DL (ref 8.6–10.5)
CHLORIDE SERPL-SCNC: 95 MMOL/L (ref 98–107)
CO2 SERPL-SCNC: 28.8 MMOL/L (ref 22–29)
CREAT BLD-MCNC: 1.05 MG/DL (ref 0.57–1)
DEPRECATED RDW RBC AUTO: 56.2 FL (ref 37–54)
EOSINOPHIL # BLD AUTO: 0.19 10*3/MM3 (ref 0–0.4)
EOSINOPHIL NFR BLD AUTO: 2.7 % (ref 0.3–6.2)
ERYTHROCYTE [DISTWIDTH] IN BLOOD BY AUTOMATED COUNT: 16.8 % (ref 12.3–15.4)
GFR SERPL CREATININE-BSD FRML MDRD: 51 ML/MIN/1.73
GLUCOSE BLD-MCNC: 113 MG/DL (ref 65–99)
HCT VFR BLD AUTO: 34.8 % (ref 34–46.6)
HGB BLD-MCNC: 11.7 G/DL (ref 12–15.9)
IMM GRANULOCYTES # BLD AUTO: 0.02 10*3/MM3 (ref 0–0.05)
IMM GRANULOCYTES NFR BLD AUTO: 0.3 % (ref 0–0.5)
LYMPHOCYTES # BLD AUTO: 0.86 10*3/MM3 (ref 0.7–3.1)
LYMPHOCYTES NFR BLD AUTO: 12.4 % (ref 19.6–45.3)
MCH RBC QN AUTO: 31.3 PG (ref 26.6–33)
MCHC RBC AUTO-ENTMCNC: 33.6 G/DL (ref 31.5–35.7)
MCV RBC AUTO: 93 FL (ref 79–97)
MONOCYTES # BLD AUTO: 0.47 10*3/MM3 (ref 0.1–0.9)
MONOCYTES NFR BLD AUTO: 6.8 % (ref 5–12)
NEUTROPHILS # BLD AUTO: 5.37 10*3/MM3 (ref 1.7–7)
NEUTROPHILS NFR BLD AUTO: 77.5 % (ref 42.7–76)
NRBC BLD AUTO-RTO: 0 /100 WBC (ref 0–0.2)
PLATELET # BLD AUTO: 202 10*3/MM3 (ref 140–450)
PMV BLD AUTO: 9.4 FL (ref 6–12)
POTASSIUM BLD-SCNC: 3.9 MMOL/L (ref 3.5–5.2)
RBC # BLD AUTO: 3.74 10*6/MM3 (ref 3.77–5.28)
SODIUM BLD-SCNC: 137 MMOL/L (ref 136–145)
WBC NRBC COR # BLD: 6.93 10*3/MM3 (ref 3.4–10.8)

## 2019-12-02 PROCEDURE — 85025 COMPLETE CBC W/AUTO DIFF WBC: CPT

## 2019-12-02 PROCEDURE — 36415 COLL VENOUS BLD VENIPUNCTURE: CPT

## 2019-12-02 PROCEDURE — 93005 ELECTROCARDIOGRAM TRACING: CPT | Performed by: UROLOGY

## 2019-12-02 PROCEDURE — 80048 BASIC METABOLIC PNL TOTAL CA: CPT

## 2019-12-03 ENCOUNTER — APPOINTMENT (OUTPATIENT)
Dept: LAB | Facility: HOSPITAL | Age: 79
End: 2019-12-03

## 2019-12-03 ENCOUNTER — OFFICE VISIT (OUTPATIENT)
Dept: ONCOLOGY | Facility: CLINIC | Age: 79
End: 2019-12-03

## 2019-12-03 VITALS
RESPIRATION RATE: 18 BRPM | TEMPERATURE: 97.7 F | WEIGHT: 267 LBS | HEIGHT: 66 IN | SYSTOLIC BLOOD PRESSURE: 116 MMHG | BODY MASS INDEX: 42.91 KG/M2 | HEART RATE: 65 BPM | DIASTOLIC BLOOD PRESSURE: 75 MMHG

## 2019-12-03 DIAGNOSIS — R31.0 GROSS HEMATURIA: Primary | ICD-10-CM

## 2019-12-03 PROCEDURE — G0463 HOSPITAL OUTPT CLINIC VISIT: HCPCS | Performed by: INTERNAL MEDICINE

## 2019-12-03 PROCEDURE — 99214 OFFICE O/P EST MOD 30 MIN: CPT | Performed by: INTERNAL MEDICINE

## 2019-12-03 NOTE — PROGRESS NOTES
Hematology/Oncology Outpatient Follow Up    PATIENT NAME:Yasmeen Rahman  :1940  MRN: [id number]  PRIMARY CARE PHYSICIAN: Alex Rodriguez MD  REFERRING PHYSICIAN: Alex Rodriguez MD    Chief Complaint   Patient presents with   • Follow-up     Colon cancer   • Follow-up     Iron deficiency anemia        HISTORY OF PRESENT ILLNESS:   Stage I adenocarcinoma of the ascending colon diagnosed 2019  · This is  female who claims to have been anemic on and off for a few years for which she had been prescribed iron in the past.  She had a normal CBC on 2018 but then saw Dr. Figueredo for a bladder lesion and was found to be anemic in the  of .  She was experiencing increasing shortness of air on exertion but thought this was related to her underlying lung disease.  She also had chronic diarrhea with no blood noticed in the stool.  She was started on iron supplementation and vitamin B12 supplementation.  Her stool turned black but she was not having any nausea, vomiting, diarrhea or constipation.  She was not having any abdominal pain and had not lost any weight.  She was referred to Aayush Maurer MD and underwent work-up on 2019 including a comprehensive metabolic panel that was normal.  CBC revealed WBC 6.4, hemoglobin 10.7, MCV 84.8, RDW 16.5, platelet count 232,000, CEA  0.7 (less than 2.5).  EGD and colonoscopy was performed that day revealing 5 to 10 mm polyps in the stomach body and fundus.  Granularity, friability, erythema and congestion was present in the cardia.  3.5 cm mass in the mid ascending colon was present.  Lipoma was seen in the mid ascending colon.  4 to 6 mm polyps in the distal ascending colon and sigmoid colon were seen.  Moderate diverticulosis of the sigmoid colon, grade/stage I internal hemorrhoids and 8 mm polyp in the proximal ascending colon were seen.  Pathology on the stomach polypectomy revealed fundic gland polyp negative for dysplasia,  cardia biopsy revealed body/fundic type mucosa with mild chronic gastritis, distal ascending colon polypectomy specimen had tubular adenoma, sigmoid colon polypectomy revealed hyperplastic polyp and mass in the mid ascending colon contained invasive moderately differentiated adenocarcinoma.  The patient was then referred to Dominick Cunha DO and myself and has been seen by Dr. Cunha with plans of open right colectomy after cardiac clearance.  · 8/20/19 - Patient seen in intial consultation on referral from Dr. Arnold for colon cancer.   · 8/27/19 - WBC 7.40, hemoglobin 11.8 and platelet count 241,000.  CMP with no significant abnormality.  CEA 1.11 (0-3).  Chest x-ray with cardiomegaly and no active cardiopulmonary disease.  · 9/5/2019 patient underwent open right hemicolectomy by Dominick Cunha DO with pathology revealing invasive moderately differentiated adenocarcinoma (1.8 cm) with invasion into the muscularis propria (pT2).  Margin of excision was free of tumor.  Vermiform appendix with a obliterated lumen, otherwise unremarkable.  13 benign reactive lymph nodes, negative for metastatic tumor (pN0) were excised.  · 9/24/2019 patient seen in follow-up of surgery and no adjuvant therapy recommended.  2.  Anemia diagnosed August 2019  · 8/7/2019  hemoglobin 10.7, MCV 84.8, RDW 16.5.  Patient on ferrous sulfate 325 mg p.o. Daily.  · 9/24/2019 hemoglobin 10.9.  Patient asked to increase ferrous sulfate to 325 mg p.o. twice daily.  · 10/21/2019 hemoglobin 10, MCV 89.1, creatinine 0.83 (0.57-1).  · 11/4/2019 and 11/11/2018-patient received IV Injectafer as she could not tolerate oral iron supplementation      Past Medical History:   Diagnosis Date   • Anemia 2018   • Anxiety 2018   • Arthritis    • Atrial fibrillation (CMS/HCC)    • CAD (coronary artery disease) 2013   • CKD (chronic kidney disease) 2019   • Colon cancer (CMS/HCC)    • GERD (gastroesophageal reflux disease) 2009   • Gout 2009   • High cholesterol  1989   • Hypertension 1989   • Hypothyroidism 2004   • Lupus (CMS/HCC)    • PHOENIX (obstructive sleep apnea)    • Osteopenia 2015   • Osteoporosis 2004   • PONV (postoperative nausea and vomiting)    • Rheumatoid arthritis (CMS/HCC) 2009       Past Surgical History:   Procedure Laterality Date   • CARDIAC CATHETERIZATION      no stents   • CHOLECYSTECTOMY  1990    laparoscopic    • COLON RESECTION Right 9/5/2019    Procedure: Open right hemicolectomy;  Surgeon: Dominick Cunha DO;  Location: [facility] MAIN OR;  Service: General   • COLONOSCOPY     • HEEL SPUR EXCISION Right 1994   • HYSTERECTOMY  1994    total due to heavy beeding and fibroids   • JOINT REPLACEMENT     • TONSILLECTOMY  1962   • TOTAL HIP ARTHROPLASTY Right 2010         Current Outpatient Medications:   •  acetaminophen (TYLENOL 8 HOUR ARTHRITIS PAIN) 650 MG 8 hr tablet, Take 650 mg by mouth Every 8 (Eight) Hours As Needed for Mild Pain ., Disp: , Rfl:   •  amLODIPine (NORVASC) 10 MG tablet, Daily., Disp: , Rfl:   •  aspirin 81 MG tablet, Take 81 mg by mouth Daily., Disp: , Rfl:   •  b complex vitamins tablet, B COMPLEX TABS, Disp: , Rfl:   •  bumetanide (BUMEX) 2 MG tablet, Take 2 mg by mouth Daily., Disp: , Rfl:   •  Calcium Carbonate-Vitamin D (CALCIUM 600+D) 600-200 MG-UNIT tablet, Take 1,200 mg by mouth Daily., Disp: , Rfl:   •  Cholecalciferol (VITAMIN D3) 2000 units capsule, VITAMIN D3 2000 UNIT CAPS, Disp: , Rfl:   •  CloNIDine (CATAPRES) 0.1 MG tablet, TAKE 1 TABLET TWICE A DAY, Disp: , Rfl:   •  colestipol (COLESTID) 1 g tablet, Take 2 g by mouth 2 (Two) Times a Day., Disp: , Rfl: 1  •  Cranberry 125 MG tablet, Take 500 mg by mouth., Disp: , Rfl:   •  cyanocobalamin (VITAMIN B-12) 2500 MCG tablet tablet, 1000 mcg, Disp: , Rfl:   •  denosumab (PROLIA) 60 MG/ML solution prefilled syringe syringe, PROLIA 60 MG/ML SOSY, Disp: , Rfl:   •  diphenhydrAMINE HCl (BENADRYL ALLERGY PO), Take  by mouth., Disp: , Rfl:   •  escitalopram (LEXAPRO) 10 MG  tablet, Take 10 mg by mouth Daily., Disp: , Rfl:   •  ferrous sulfate 325 (65 FE) MG tablet, TAKE 1 TABLET BY MOUTh DAILY WITH MEALS, Disp: , Rfl:   •  gabapentin (NEURONTIN) 100 MG capsule, Take 200 mg by mouth Every Morning., Disp: , Rfl:   •  gabapentin (NEURONTIN) 300 MG capsule, Take 300 mg by mouth Every Night., Disp: , Rfl:   •  hydroxychloroquine (PLAQUENIL) 200 MG tablet, , Disp: , Rfl:   •  levothyroxine (SYNTHROID, LEVOTHROID) 100 MCG tablet, Take 100 mcg by mouth Daily., Disp: , Rfl:   •  metoprolol tartrate (LOPRESSOR) 100 MG tablet, TAKE 1 TABLET TWICE A DAY, Disp: , Rfl:   •  omeprazole (priLOSEC) 40 MG capsule, Daily., Disp: , Rfl:   •  ondansetron ODT (ZOFRAN ODT) 8 MG disintegrating tablet, Take 1 tablet by mouth Every 8 (Eight) Hours As Needed for Nausea or Vomiting., Disp: 30 tablet, Rfl: 5  •  potassium chloride (K-DUR) 10 MEQ CR tablet, 10 mEq Daily., Disp: , Rfl:   •  pravastatin (PRAVACHOL) 40 MG tablet, Take 40 mg by mouth Daily., Disp: , Rfl:   •  valsartan (DIOVAN) 160 MG tablet, 160 mg Daily., Disp: , Rfl:   •  vitamin C (ASCORBIC ACID) 500 MG tablet, Take 500 mg by mouth Daily., Disp: , Rfl:   •  XARELTO 20 MG tablet, , Disp: , Rfl:   •  Hyaluronan (ORTHOVISC) 30 MG/2ML solution prefilled syringe injection, Inject 2 mL into the appropriate joint as directed by provider., Disp: , Rfl:   •  Unable to find, 1 each 1 (One) Time. Pt unsure of name; it is for itching, Disp: , Rfl:     Allergies   Allergen Reactions   • Codeine Nausea And Vomiting   • Hydrocodone-Acetaminophen Nausea And Vomiting     Nausea,vomiting       Family History   Problem Relation Age of Onset   • Colon cancer Brother 71       Cancer-related family history includes Colon cancer (age of onset: 71) in her brother.    Social History     Tobacco Use   • Smoking status: Never Smoker   • Smokeless tobacco: Never Used   Substance Use Topics   • Alcohol use: No     Frequency: Never   • Drug use: No       I have reviewed the  "history of present illness, past medical history, family history, social history, lab results, all notes and other records since the patient was last seen on 11/12/2019.    SUBJECTIVE:  The patient is here for a follow up appointment.  The patient is accompanied by her daughter for this appointment.  The patient's daughter stated that the patient has had blood in her urine and she is now scheduled for surgery with urology, Dr. Figueredo, on 12/9/2019.  The patient stated that urology feels that they will just need to cauterized the area.  The patient continues to take Xarelto but will be holding 5 days prior to surgery.            REVIEW OF SYSTEMS:  Review of Systems   Constitutional: Negative for chills and fever.   HENT: Negative for ear pain, mouth sores, nosebleeds and sore throat.    Eyes: Negative for photophobia and visual disturbance.   Respiratory: Negative for wheezing and stridor.    Cardiovascular: Negative for chest pain and palpitations.   Gastrointestinal: Negative for abdominal pain, diarrhea, nausea and vomiting.   Endocrine: Negative for cold intolerance and heat intolerance.   Genitourinary: Positive for hematuria (Followed by Dr. Figueredo). Negative for dysuria.   Musculoskeletal: Positive for gait problem (walking with rolator walker). Negative for joint swelling and neck stiffness.   Skin: Negative for color change and rash.   Neurological: Negative for seizures and syncope.   Hematological: Negative for adenopathy.        No obvious bleeding   Psychiatric/Behavioral: Negative for agitation, confusion and hallucinations.       OBJECTIVE:    Vitals:    12/03/19 1255   BP: 116/75   Pulse: 65   Resp: 18   Temp: 97.7 °F (36.5 °C)   Weight: 121 kg (267 lb)   Height: 167.6 cm (66\")   PainSc:   2   PainLoc: Back  Comment: sore.       ECOG  (2) Ambulatory and capable of self care, unable to carry out work activity, up and about > 50% or waking hours    Physical Exam   Constitutional: She is oriented to " person, place, and time. No distress.   HENT:   Head: Normocephalic and atraumatic.   Eyes: Conjunctivae and EOM are normal. Right eye exhibits no discharge. Left eye exhibits no discharge. No scleral icterus.   Neck: Normal range of motion. Neck supple. No thyromegaly present.   Cardiovascular: Normal rate, regular rhythm and normal heart sounds. Exam reveals no gallop and no friction rub.   Pulmonary/Chest: Effort normal. No stridor. No respiratory distress. She has no wheezes.   Abdominal: Soft. Bowel sounds are normal. She exhibits no mass. There is no tenderness. There is no rebound and no guarding.   Musculoskeletal: Normal range of motion. She exhibits no tenderness.   Lymphadenopathy:     She has no cervical adenopathy.   Neurological: She is alert and oriented to person, place, and time. She exhibits normal muscle tone.   Skin: Skin is warm. No rash noted. She is not diaphoretic. No erythema.   Psychiatric: She has a normal mood and affect. Her behavior is normal.   Nursing note and vitals reviewed.      RECENT LABS  WBC   Date Value Ref Range Status   12/02/2019 6.93 3.40 - 10.80 10*3/mm3 Final     RBC   Date Value Ref Range Status   12/02/2019 3.74 (L) 3.77 - 5.28 10*6/mm3 Final     Hemoglobin   Date Value Ref Range Status   12/02/2019 11.7 (L) 12.0 - 15.9 g/dL Final     Hematocrit   Date Value Ref Range Status   12/02/2019 34.8 34.0 - 46.6 % Final     MCV   Date Value Ref Range Status   12/02/2019 93.0 79.0 - 97.0 fL Final     MCH   Date Value Ref Range Status   12/02/2019 31.3 26.6 - 33.0 pg Final     MCHC   Date Value Ref Range Status   12/02/2019 33.6 31.5 - 35.7 g/dL Final     RDW   Date Value Ref Range Status   12/02/2019 16.8 (H) 12.3 - 15.4 % Final     RDW-SD   Date Value Ref Range Status   12/02/2019 56.2 (H) 37.0 - 54.0 fl Final     MPV   Date Value Ref Range Status   12/02/2019 9.4 6.0 - 12.0 fL Final     Platelets   Date Value Ref Range Status   12/02/2019 202 140 - 450 10*3/mm3 Final      Neutrophil %   Date Value Ref Range Status   12/02/2019 77.5 (H) 42.7 - 76.0 % Final     Lymphocyte %   Date Value Ref Range Status   12/02/2019 12.4 (L) 19.6 - 45.3 % Final     Monocyte %   Date Value Ref Range Status   12/02/2019 6.8 5.0 - 12.0 % Final     Eosinophil %   Date Value Ref Range Status   12/02/2019 2.7 0.3 - 6.2 % Final     Basophil %   Date Value Ref Range Status   12/02/2019 0.3 0.0 - 1.5 % Final     Immature Grans %   Date Value Ref Range Status   12/02/2019 0.3 0.0 - 0.5 % Final     Neutrophils, Absolute   Date Value Ref Range Status   12/02/2019 5.37 1.70 - 7.00 10*3/mm3 Final     Lymphocytes, Absolute   Date Value Ref Range Status   12/02/2019 0.86 0.70 - 3.10 10*3/mm3 Final     Monocytes, Absolute   Date Value Ref Range Status   12/02/2019 0.47 0.10 - 0.90 10*3/mm3 Final     Eosinophils, Absolute   Date Value Ref Range Status   12/02/2019 0.19 0.00 - 0.40 10*3/mm3 Final     Basophils, Absolute   Date Value Ref Range Status   12/02/2019 0.02 0.00 - 0.20 10*3/mm3 Final     Immature Grans, Absolute   Date Value Ref Range Status   12/02/2019 0.02 0.00 - 0.05 10*3/mm3 Final     nRBC   Date Value Ref Range Status   12/02/2019 0.0 0.0 - 0.2 /100 WBC Final       Lab Results   Component Value Date    GLUCOSE 113 (H) 12/02/2019    BUN 19 12/02/2019    CREATININE 1.05 (H) 12/02/2019    EGFRIFNONA 51 (L) 12/02/2019    EGFRIFAFRI 71 03/06/2019    BCR 18.1 12/02/2019    K 3.9 12/02/2019    CO2 28.8 12/02/2019    CALCIUM 9.0 12/02/2019    PROTENTOTREF 6.2 10/23/2019    ALBUMIN 3.5 10/23/2019    LABIL2 1.3 10/23/2019    AST 54 (H) 09/11/2019    ALT 52 09/11/2019         Assessment/Plan     There are no diagnoses linked to this encounter.    ASSESSMENT:    1. Stage I adenocarcinoma of the ascending colon, adjuvant treatment not recommended  2. Iron deficiency anemia due to chronic blood loss  3. Hematuria: Status post cystoscopy.  Patient scheduled for exam under anesthesia by Dr. Figueredo on  12/9/2019      Discussion:    Patient has received IV Injectafer and has had a hemoglobin response to 11.7 g per DL.  She is also scheduled to undergo cystoscopy and exam under anesthesia and possible biopsy by Dr. Figueredo.  I request for those records.  She remain off oral iron supplementation as she has difficulty tolerating.  Her anemia work-up was essentially unremarkable, her ferritin was 40.    PLANS:     Proceed with cystoscopy under the care of Dr. Figueredo  Continue to discontinue Ferrous Sulfate.  Patient to call me should she develop further hematuria for repeat IV Injectafer  She will follow up in 4 weeks to review her bladder pathology report and also repeat her CBC  If itching persist consult with Dermatologist.          I have reviewed labs results, imaging, vitals, and medications with the patient today. Will follow up in 1 month with me.    Patient verbalized understanding and is in agreement of the above plan.    Part of this document was scribed by Justine Lyons, RN, BSN.

## 2019-12-09 ENCOUNTER — LAB REQUISITION (OUTPATIENT)
Dept: LAB | Facility: HOSPITAL | Age: 79
End: 2019-12-09

## 2019-12-09 DIAGNOSIS — N32.9 BLADDER DISORDER, UNSPECIFIED: ICD-10-CM

## 2019-12-09 PROCEDURE — 88305 TISSUE EXAM BY PATHOLOGIST: CPT | Performed by: UROLOGY

## 2019-12-10 ENCOUNTER — TELEPHONE (OUTPATIENT)
Dept: ONCOLOGY | Facility: CLINIC | Age: 79
End: 2019-12-10

## 2019-12-10 LAB
LAB AP CASE REPORT: NORMAL
LAB AP DIAGNOSIS COMMENT: NORMAL
PATH REPORT.FINAL DX SPEC: NORMAL
PATH REPORT.GROSS SPEC: NORMAL

## 2019-12-10 NOTE — TELEPHONE ENCOUNTER
Ms. Rahman left message asking to confirm appt time.  Returned call.  Pt states she has appt for 1/3/20 that someone was scheduling, but didn't know what time.  Explained would have to call her back to verify appt date/time.  Message to Miracle MEJIAS to see if she was scheduling that time.

## 2019-12-11 ENCOUNTER — TELEPHONE (OUTPATIENT)
Dept: ONCOLOGY | Facility: CLINIC | Age: 79
End: 2019-12-11

## 2019-12-29 PROCEDURE — 93010 ELECTROCARDIOGRAM REPORT: CPT | Performed by: INTERNAL MEDICINE

## 2020-01-02 ENCOUNTER — APPOINTMENT (OUTPATIENT)
Dept: LAB | Facility: HOSPITAL | Age: 80
End: 2020-01-02

## 2020-01-02 ENCOUNTER — OFFICE VISIT (OUTPATIENT)
Dept: ONCOLOGY | Facility: CLINIC | Age: 80
End: 2020-01-02

## 2020-01-02 VITALS
TEMPERATURE: 98.2 F | RESPIRATION RATE: 18 BRPM | WEIGHT: 265.8 LBS | SYSTOLIC BLOOD PRESSURE: 111 MMHG | DIASTOLIC BLOOD PRESSURE: 69 MMHG | HEART RATE: 65 BPM | HEIGHT: 66 IN | OXYGEN SATURATION: 99 % | BODY MASS INDEX: 42.72 KG/M2

## 2020-01-02 DIAGNOSIS — C18.2 CARCINOMA OF ASCENDING COLON (HCC): Primary | ICD-10-CM

## 2020-01-02 PROCEDURE — 99214 OFFICE O/P EST MOD 30 MIN: CPT | Performed by: INTERNAL MEDICINE

## 2020-01-02 PROCEDURE — G0463 HOSPITAL OUTPT CLINIC VISIT: HCPCS | Performed by: INTERNAL MEDICINE

## 2020-01-02 RX ORDER — PREDNISONE 10 MG/1
TABLET ORAL DAILY
COMMUNITY
Start: 2019-12-30 | End: 2020-06-11

## 2020-01-02 RX ORDER — ALLOPURINOL 100 MG/1
100 TABLET ORAL DAILY
COMMUNITY
Start: 2019-12-30

## 2020-01-02 RX ORDER — SUCRALFATE 1 G/1
1 TABLET ORAL 4 TIMES DAILY
COMMUNITY
Start: 2019-12-30 | End: 2020-12-23 | Stop reason: ALTCHOICE

## 2020-01-02 RX ORDER — METOLAZONE 5 MG/1
TABLET ORAL DAILY
COMMUNITY
Start: 2019-12-30 | End: 2020-06-11

## 2020-01-02 RX ORDER — IRBESARTAN 300 MG/1
300 TABLET ORAL DAILY
COMMUNITY
Start: 2019-12-30

## 2020-01-02 RX ORDER — PANTOPRAZOLE SODIUM 40 MG/1
40 TABLET, DELAYED RELEASE ORAL DAILY
COMMUNITY
Start: 2019-12-11

## 2020-01-02 NOTE — PROGRESS NOTES
Hematology/Oncology Outpatient Follow Up    PATIENT NAME:Yasmeen Rahman  :1940  MRN: [id number]  PRIMARY CARE PHYSICIAN: Alex Rodriguez MD  REFERRING PHYSICIAN: Alex Rodriguez MD    Chief Complaint   Patient presents with   • Follow-up     Colon Cancer        HISTORY OF PRESENT ILLNESS:   Stage I adenocarcinoma of the ascending colon diagnosed 2019  · This is  female who claims to have been anemic on and off for a few years for which she had been prescribed iron in the past.  She had a normal CBC on 2018 but then saw Dr. Figueredo for a bladder lesion and was found to be anemic in the fall of .  She was experiencing increasing shortness of air on exertion but thought this was related to her underlying lung disease.  She also had chronic diarrhea with no blood noticed in the stool.  She was started on iron supplementation and vitamin B12 supplementation.  Her stool turned black but she was not having any nausea, vomiting, diarrhea or constipation.  She was not having any abdominal pain and had not lost any weight.  She was referred to Aayush Maurer MD and underwent work-up on 2019 including a comprehensive metabolic panel that was normal.  CBC revealed WBC 6.4, hemoglobin 10.7, MCV 84.8, RDW 16.5, platelet count 232,000, CEA  0.7 (less than 2.5).  EGD and colonoscopy was performed that day revealing 5 to 10 mm polyps in the stomach body and fundus.  Granularity, friability, erythema and congestion was present in the cardia.  3.5 cm mass in the mid ascending colon was present.  Lipoma was seen in the mid ascending colon.  4 to 6 mm polyps in the distal ascending colon and sigmoid colon were seen.  Moderate diverticulosis of the sigmoid colon, grade/stage I internal hemorrhoids and 8 mm polyp in the proximal ascending colon were seen.  Pathology on the stomach polypectomy revealed fundic gland polyp negative for dysplasia, cardia biopsy revealed body/fundic type  mucosa with mild chronic gastritis, distal ascending colon polypectomy specimen had tubular adenoma, sigmoid colon polypectomy revealed hyperplastic polyp and mass in the mid ascending colon contained invasive moderately differentiated adenocarcinoma.  The patient was then referred to Dominick Cunha DO and myself and has been seen by Dr. Cunha with plans of open right colectomy after cardiac clearance.  · 8/20/19 - Patient seen in intial consultation on referral from Dr. Arnold for colon cancer.   · 8/27/19 - WBC 7.40, hemoglobin 11.8 and platelet count 241,000.  CMP with no significant abnormality.  CEA 1.11 (0-3).  Chest x-ray with cardiomegaly and no active cardiopulmonary disease.  · 9/5/2019 patient underwent open right hemicolectomy by Dominick Cunha DO with pathology revealing invasive moderately differentiated adenocarcinoma (1.8 cm) with invasion into the muscularis propria (pT2).  Margin of excision was free of tumor.  Vermiform appendix with a obliterated lumen, otherwise unremarkable.  13 benign reactive lymph nodes, negative for metastatic tumor (pN0) were excised.  · 9/24/2019 patient seen in follow-up of surgery and no adjuvant therapy recommended.  2.  Anemia diagnosed August 2019  · 8/7/2019  hemoglobin 10.7, MCV 84.8, RDW 16.5.  Patient on ferrous sulfate 325 mg p.o. Daily.  · 9/24/2019 hemoglobin 10.9.  Patient asked to increase ferrous sulfate to 325 mg p.o. twice daily.  · 10/21/2019 hemoglobin 10, MCV 89.1, creatinine 0.83 (0.57-1).  · 11/4/2019 and 11/11/2018-patient received IV Injectafer as she could not tolerate oral iron supplementation  · 12/9/2019-patient had a cystoscopy with benign pathology.  Patient has chronic cystitis but no evidence of malignancy.      Past Medical History:   Diagnosis Date   • Anemia 2018   • Anxiety 2018   • Arthritis    • Atrial fibrillation (CMS/HCC)    • CAD (coronary artery disease) 2013   • CKD (chronic kidney disease) 2019   • Colon cancer (CMS/HCC)     • GERD (gastroesophageal reflux disease) 2009   • Gout 2009   • High cholesterol 1989   • Hypertension 1989   • Hypothyroidism 2004   • Lupus (CMS/HCC)    • PHOENIX (obstructive sleep apnea)    • Osteopenia 2015   • Osteoporosis 2004   • PONV (postoperative nausea and vomiting)    • Rheumatoid arthritis (CMS/HCC) 2009       Past Surgical History:   Procedure Laterality Date   • CARDIAC CATHETERIZATION      no stents   • CHOLECYSTECTOMY  1990    laparoscopic    • COLON RESECTION Right 9/5/2019    Procedure: Open right hemicolectomy;  Surgeon: Dominick Cunha DO;  Location: [facility] OR;  Service: General   • COLONOSCOPY     • HEEL SPUR EXCISION Right 1994   • HYSTERECTOMY  1994    total due to heavy beeding and fibroids   • JOINT REPLACEMENT     • TONSILLECTOMY  1962   • TOTAL HIP ARTHROPLASTY Right 2010         Current Outpatient Medications:   •  acetaminophen (TYLENOL 8 HOUR ARTHRITIS PAIN) 650 MG 8 hr tablet, Take 650 mg by mouth Every 8 (Eight) Hours As Needed for Mild Pain ., Disp: , Rfl:   •  allopurinol (ZYLOPRIM) 100 MG tablet, Take  by mouth Daily., Disp: , Rfl:   •  amLODIPine (NORVASC) 10 MG tablet, Daily., Disp: , Rfl:   •  aspirin 81 MG tablet, Take 81 mg by mouth Daily., Disp: , Rfl:   •  b complex vitamins tablet, B COMPLEX TABS, Disp: , Rfl:   •  bumetanide (BUMEX) 2 MG tablet, Take 2 mg by mouth Daily., Disp: , Rfl:   •  Calcium Carbonate-Vitamin D (CALCIUM 600+D) 600-200 MG-UNIT tablet, Take 1,200 mg by mouth Daily., Disp: , Rfl:   •  Cholecalciferol (VITAMIN D3) 2000 units capsule, VITAMIN D3 2000 UNIT CAPS, Disp: , Rfl:   •  CloNIDine (CATAPRES) 0.1 MG tablet, TAKE 1 TABLET TWICE A DAY, Disp: , Rfl:   •  colestipol (COLESTID) 1 g tablet, Take 2 g by mouth 2 (Two) Times a Day., Disp: , Rfl: 1  •  cyanocobalamin (VITAMIN B-12) 2500 MCG tablet tablet, 1000 mcg, Disp: , Rfl:   •  denosumab (PROLIA) 60 MG/ML solution prefilled syringe syringe, PROLIA 60 MG/ML SOSJYOTSNA, Disp: , Rfl:   •  diphenhydrAMINE  HCl (BENADRYL ALLERGY PO), Take  by mouth., Disp: , Rfl:   •  escitalopram (LEXAPRO) 10 MG tablet, Take 10 mg by mouth Daily., Disp: , Rfl:   •  ferrous sulfate 325 (65 FE) MG tablet, TAKE 1 TABLET BY MOUTh DAILY WITH MEALS, Disp: , Rfl:   •  gabapentin (NEURONTIN) 100 MG capsule, Take 200 mg by mouth Every Morning., Disp: , Rfl:   •  gabapentin (NEURONTIN) 300 MG capsule, Take 300 mg by mouth Every Night., Disp: , Rfl:   •  hydroxychloroquine (PLAQUENIL) 200 MG tablet, , Disp: , Rfl:   •  levothyroxine (SYNTHROID, LEVOTHROID) 100 MCG tablet, Take 100 mcg by mouth Daily., Disp: , Rfl:   •  metOLazone (ZAROXOLYN) 5 MG tablet, Take  by mouth Daily., Disp: , Rfl:   •  metoprolol tartrate (LOPRESSOR) 100 MG tablet, TAKE 1 TABLET TWICE A DAY, Disp: , Rfl:   •  omeprazole (priLOSEC) 40 MG capsule, Daily., Disp: , Rfl:   •  ondansetron ODT (ZOFRAN ODT) 8 MG disintegrating tablet, Take 1 tablet by mouth Every 8 (Eight) Hours As Needed for Nausea or Vomiting., Disp: 30 tablet, Rfl: 5  •  pantoprazole (PROTONIX) 40 MG EC tablet, Take  by mouth Daily., Disp: , Rfl:   •  potassium chloride (K-DUR) 10 MEQ CR tablet, 10 mEq Daily., Disp: , Rfl:   •  pravastatin (PRAVACHOL) 40 MG tablet, Take 40 mg by mouth Daily., Disp: , Rfl:   •  predniSONE (DELTASONE) 10 MG tablet, Take  by mouth Daily., Disp: , Rfl:   •  sucralfate (CARAFATE) 1 g tablet, TK 1 T PO TID 1 HOUR B MEALS AND HS OES, Disp: , Rfl:   •  valsartan (DIOVAN) 160 MG tablet, 320 mg Daily., Disp: , Rfl:   •  vitamin C (ASCORBIC ACID) 500 MG tablet, Take 500 mg by mouth Daily., Disp: , Rfl:   •  XARELTO 20 MG tablet, , Disp: , Rfl:   •  Hyaluronan (ORTHOVISC) 30 MG/2ML solution prefilled syringe injection, Inject 2 mL into the appropriate joint as directed by provider., Disp: , Rfl:   •  irbesartan (AVAPRO) 300 MG tablet, , Disp: , Rfl:     Allergies   Allergen Reactions   • Codeine Nausea And Vomiting   • Hydrocodone-Acetaminophen Nausea And Vomiting     Nausea,vomiting        Family History   Problem Relation Age of Onset   • Colon cancer Brother 71       Cancer-related family history includes Colon cancer (age of onset: 71) in her brother.    Social History     Tobacco Use   • Smoking status: Never Smoker   • Smokeless tobacco: Never Used   Substance Use Topics   • Alcohol use: No     Frequency: Never   • Drug use: No       I have reviewed the history of present illness, past medical history, family history, social history, lab results, all notes and other records since the patient was last seen on 11/12/2019.    SUBJECTIVE:  The patient is here for a follow up appointment.  The patient is accompanied by her daughter for this appointment.  Patient is using a walker. Patient talked about the results of the cystoscopy. She has a follow up with Dr. Figueredo. They are wanting the results to be sent to our office from [facility]. They have not came through at this time. They would like a call with the results.           REVIEW OF SYSTEMS:  Review of Systems   Constitutional: Negative for chills and fever.   HENT: Negative for ear pain, mouth sores, nosebleeds and sore throat.    Eyes: Negative for photophobia and visual disturbance.   Respiratory: Negative for wheezing and stridor.    Cardiovascular: Negative for chest pain and palpitations.   Gastrointestinal: Negative for abdominal pain, diarrhea, nausea and vomiting.   Endocrine: Negative for cold intolerance and heat intolerance.   Genitourinary: Negative for dysuria.   Musculoskeletal: Negative for joint swelling and neck stiffness. Gait problem: walking with rolator walker    Skin: Negative for color change and rash.   Neurological: Negative for seizures and syncope.   Hematological: Negative for adenopathy.        No obvious bleeding   Psychiatric/Behavioral: Negative for agitation, confusion and hallucinations.       OBJECTIVE:    Vitals:    01/02/20 1125   BP: 111/69   Pulse: 65   Resp: 18   Temp: 98.2 °F (36.8 °C)   SpO2: 99%  "  Weight: 121 kg (265 lb 12.8 oz)   Height: 167.6 cm (66\")   PainSc: 0-No pain       ECOG  (2) Ambulatory and capable of self care, unable to carry out work activity, up and about > 50% or waking hours    Physical Exam   Constitutional: She is oriented to person, place, and time. No distress.   HENT:   Head: Normocephalic and atraumatic.   Eyes: Conjunctivae and EOM are normal. Right eye exhibits no discharge. Left eye exhibits no discharge. No scleral icterus.   Neck: Normal range of motion. Neck supple. No thyromegaly present.   Cardiovascular: Normal rate, regular rhythm and normal heart sounds. Exam reveals no gallop and no friction rub.   Pulmonary/Chest: Effort normal. No stridor. No respiratory distress. She has no wheezes.   Abdominal: Soft. Bowel sounds are normal. She exhibits no mass. There is no tenderness. There is no rebound and no guarding.   Musculoskeletal: Normal range of motion. She exhibits no tenderness.   Lymphadenopathy:     She has no cervical adenopathy.   Neurological: She is alert and oriented to person, place, and time. She exhibits normal muscle tone.   Skin: Skin is warm. No rash noted. She is not diaphoretic. No erythema.   Psychiatric: She has a normal mood and affect. Her behavior is normal.   Nursing note and vitals reviewed.      RECENT LABS  WBC   Date Value Ref Range Status   12/02/2019 6.93 3.40 - 10.80 10*3/mm3 Final     RBC   Date Value Ref Range Status   12/02/2019 3.74 (L) 3.77 - 5.28 10*6/mm3 Final     Hemoglobin   Date Value Ref Range Status   12/02/2019 11.7 (L) 12.0 - 15.9 g/dL Final     Hematocrit   Date Value Ref Range Status   12/02/2019 34.8 34.0 - 46.6 % Final     MCV   Date Value Ref Range Status   12/02/2019 93.0 79.0 - 97.0 fL Final     MCH   Date Value Ref Range Status   12/02/2019 31.3 26.6 - 33.0 pg Final     MCHC   Date Value Ref Range Status   12/02/2019 33.6 31.5 - 35.7 g/dL Final     RDW   Date Value Ref Range Status   12/02/2019 16.8 (H) 12.3 - 15.4 % " Final     RDW-SD   Date Value Ref Range Status   12/02/2019 56.2 (H) 37.0 - 54.0 fl Final     MPV   Date Value Ref Range Status   12/02/2019 9.4 6.0 - 12.0 fL Final     Platelets   Date Value Ref Range Status   12/02/2019 202 140 - 450 10*3/mm3 Final     Neutrophil %   Date Value Ref Range Status   12/02/2019 77.5 (H) 42.7 - 76.0 % Final     Lymphocyte %   Date Value Ref Range Status   12/02/2019 12.4 (L) 19.6 - 45.3 % Final     Monocyte %   Date Value Ref Range Status   12/02/2019 6.8 5.0 - 12.0 % Final     Eosinophil %   Date Value Ref Range Status   12/02/2019 2.7 0.3 - 6.2 % Final     Basophil %   Date Value Ref Range Status   12/02/2019 0.3 0.0 - 1.5 % Final     Immature Grans %   Date Value Ref Range Status   12/02/2019 0.3 0.0 - 0.5 % Final     Neutrophils, Absolute   Date Value Ref Range Status   12/02/2019 5.37 1.70 - 7.00 10*3/mm3 Final     Lymphocytes, Absolute   Date Value Ref Range Status   12/02/2019 0.86 0.70 - 3.10 10*3/mm3 Final     Monocytes, Absolute   Date Value Ref Range Status   12/02/2019 0.47 0.10 - 0.90 10*3/mm3 Final     Eosinophils, Absolute   Date Value Ref Range Status   12/02/2019 0.19 0.00 - 0.40 10*3/mm3 Final     Basophils, Absolute   Date Value Ref Range Status   12/02/2019 0.02 0.00 - 0.20 10*3/mm3 Final     Immature Grans, Absolute   Date Value Ref Range Status   12/02/2019 0.02 0.00 - 0.05 10*3/mm3 Final     nRBC   Date Value Ref Range Status   12/02/2019 0.0 0.0 - 0.2 /100 WBC Final       Lab Results   Component Value Date    GLUCOSE 113 (H) 12/02/2019    BUN 19 12/02/2019    CREATININE 1.05 (H) 12/02/2019    EGFRIFNONA 51 (L) 12/02/2019    EGFRIFAFRI 71 03/06/2019    BCR 18.1 12/02/2019    K 3.9 12/02/2019    CO2 28.8 12/02/2019    CALCIUM 9.0 12/02/2019    PROTENTOTREF 6.2 10/23/2019    ALBUMIN 3.5 10/23/2019    LABIL2 1.3 10/23/2019    AST 54 (H) 09/11/2019    ALT 52 09/11/2019         Assessment/Plan     Carcinoma of ascending colon (CMS/HCC)  - CBC & Differential  - CBC  Auto Differential      ASSESSMENT:    1. Stage I adenocarcinoma of the ascending colon, adjuvant treatment not recommended  2. Iron deficiency anemia due to chronic blood loss( hematuria)  3. Hematuria: Status post cystoscopy.  Patient scheduled for exam under anesthesia by Dr. Figueredo on 12/9/2019. Benign pathology. Follow up with Dr Figueredo encouraged.      Discussion:    Patient has received IV Injectafer and has had a hemoglobin response to 12 g per DL.   She will remain off oral iron supplementation as she has difficulty tolerating and she has had response to IV injectafer  Her anemia work-up was essentially unremarkable, her ferritin was 40.  Patient will be due for repeat colonoscopy early summer 2020.      PLANS:     I have reviewed her cystoscopy results performed by  Dr. Figueredo  She is now off oral iron supplementation.  Hemoglobin is 12 g per DL  I will see her back in 2 months with a CBC,CMP and CEA done at that visit             I have reviewed labs results, imaging, vitals, and medications with the patient today. Will follow up in 2 months with me.    Patient verbalized understanding and is in agreement of the above plan.    Part of this document was scribed by MELISSA Goodwin.

## 2020-01-23 ENCOUNTER — TRANSCRIBE ORDERS (OUTPATIENT)
Dept: ADMINISTRATIVE | Facility: HOSPITAL | Age: 80
End: 2020-01-23

## 2020-01-23 DIAGNOSIS — R31.9 HEMATURIA SYNDROME: ICD-10-CM

## 2020-01-23 DIAGNOSIS — N18.2 CHRONIC KIDNEY DISEASE, STAGE II (MILD): Primary | ICD-10-CM

## 2020-02-06 ENCOUNTER — HOSPITAL ENCOUNTER (OUTPATIENT)
Dept: ULTRASOUND IMAGING | Facility: HOSPITAL | Age: 80
Discharge: HOME | End: 2020-02-06
Admitting: INTERNAL MEDICINE

## 2020-02-06 ENCOUNTER — TRANSCRIBE ORDERS (OUTPATIENT)
Dept: ADMINISTRATIVE | Facility: HOSPITAL | Age: 80
End: 2020-02-06

## 2020-02-06 ENCOUNTER — LAB (OUTPATIENT)
Dept: LAB | Facility: HOSPITAL | Age: 80
End: 2020-02-06

## 2020-02-06 DIAGNOSIS — E55.9 VITAMIN D DEFICIENCY, UNSPECIFIED: ICD-10-CM

## 2020-02-06 DIAGNOSIS — R31.9 HEMATURIA SYNDROME: ICD-10-CM

## 2020-02-06 DIAGNOSIS — N18.2 CHRONIC KIDNEY DISEASE, STAGE II (MILD): ICD-10-CM

## 2020-02-06 DIAGNOSIS — E03.9 HYPOTHYROIDISM, UNSPECIFIED TYPE: ICD-10-CM

## 2020-02-06 DIAGNOSIS — I10 ESSENTIAL HYPERTENSION: ICD-10-CM

## 2020-02-06 DIAGNOSIS — N18.2 CHRONIC KIDNEY DISEASE, STAGE II (MILD): Primary | ICD-10-CM

## 2020-02-06 LAB
25(OH)D3 SERPL-MCNC: 54.2 NG/ML (ref 30–100)
ALBUMIN SERPL-MCNC: 4.4 G/DL (ref 3.5–5.2)
ALBUMIN/GLOB SERPL: 1.6 G/DL
ALP SERPL-CCNC: 97 U/L (ref 39–117)
ALT SERPL W P-5'-P-CCNC: 10 U/L (ref 1–33)
ANION GAP SERPL CALCULATED.3IONS-SCNC: 19 MMOL/L (ref 5–15)
AST SERPL-CCNC: 17 U/L (ref 1–32)
BACTERIA UR QL AUTO: ABNORMAL /HPF
BASOPHILS # BLD AUTO: 0.03 10*3/MM3 (ref 0–0.2)
BASOPHILS NFR BLD AUTO: 0.3 % (ref 0–1.5)
BILIRUB SERPL-MCNC: 0.7 MG/DL (ref 0.2–1.2)
BILIRUB UR QL STRIP: NEGATIVE
BUN BLD-MCNC: 12 MG/DL (ref 8–23)
BUN/CREAT SERPL: 13.3 (ref 7–25)
CALCIUM SPEC-SCNC: 9.6 MG/DL (ref 8.6–10.5)
CHLORIDE SERPL-SCNC: 97 MMOL/L (ref 98–107)
CK SERPL-CCNC: 82 U/L (ref 20–180)
CLARITY UR: ABNORMAL
CO2 SERPL-SCNC: 24 MMOL/L (ref 22–29)
COLOR UR: YELLOW
CREAT BLD-MCNC: 0.9 MG/DL (ref 0.57–1)
CREAT UR-MCNC: 32.5 MG/DL
DEPRECATED RDW RBC AUTO: 50.2 FL (ref 37–54)
EOSINOPHIL # BLD AUTO: 0.33 10*3/MM3 (ref 0–0.4)
EOSINOPHIL NFR BLD AUTO: 3.6 % (ref 0.3–6.2)
ERYTHROCYTE [DISTWIDTH] IN BLOOD BY AUTOMATED COUNT: 15 % (ref 12.3–15.4)
GFR SERPL CREATININE-BSD FRML MDRD: 61 ML/MIN/1.73
GLOBULIN UR ELPH-MCNC: 2.8 GM/DL
GLUCOSE BLD-MCNC: 100 MG/DL (ref 65–99)
GLUCOSE UR STRIP-MCNC: NEGATIVE MG/DL
HCT VFR BLD AUTO: 36.6 % (ref 34–46.6)
HGB BLD-MCNC: 12.3 G/DL (ref 12–15.9)
HGB UR QL STRIP.AUTO: ABNORMAL
HYALINE CASTS UR QL AUTO: ABNORMAL /LPF
IMM GRANULOCYTES # BLD AUTO: 0.04 10*3/MM3 (ref 0–0.05)
IMM GRANULOCYTES NFR BLD AUTO: 0.4 % (ref 0–0.5)
IRON 24H UR-MRATE: 82 MCG/DL (ref 37–145)
KETONES UR QL STRIP: NEGATIVE
LEUKOCYTE ESTERASE UR QL STRIP.AUTO: ABNORMAL
LYMPHOCYTES # BLD AUTO: 1.08 10*3/MM3 (ref 0.7–3.1)
LYMPHOCYTES NFR BLD AUTO: 11.8 % (ref 19.6–45.3)
MCH RBC QN AUTO: 30.9 PG (ref 26.6–33)
MCHC RBC AUTO-ENTMCNC: 33.6 G/DL (ref 31.5–35.7)
MCV RBC AUTO: 92 FL (ref 79–97)
MONOCYTES # BLD AUTO: 0.55 10*3/MM3 (ref 0.1–0.9)
MONOCYTES NFR BLD AUTO: 6 % (ref 5–12)
NEUTROPHILS # BLD AUTO: 7.11 10*3/MM3 (ref 1.7–7)
NEUTROPHILS NFR BLD AUTO: 77.9 % (ref 42.7–76)
NITRITE UR QL STRIP: NEGATIVE
NRBC BLD AUTO-RTO: 0 /100 WBC (ref 0–0.2)
PH UR STRIP.AUTO: 7 [PH] (ref 5–8)
PLATELET # BLD AUTO: 328 10*3/MM3 (ref 140–450)
PMV BLD AUTO: 9.3 FL (ref 6–12)
POTASSIUM BLD-SCNC: 4.4 MMOL/L (ref 3.5–5.2)
PROT SERPL-MCNC: 7.2 G/DL (ref 6–8.5)
PROT UR QL STRIP: ABNORMAL
PROT UR-MCNC: 14 MG/DL
PROT/CREAT UR: 430.8 MG/G CREA (ref 0–200)
RBC # BLD AUTO: 3.98 10*6/MM3 (ref 3.77–5.28)
RBC # UR: ABNORMAL /HPF
REF LAB TEST METHOD: ABNORMAL
SODIUM BLD-SCNC: 140 MMOL/L (ref 136–145)
SP GR UR STRIP: 1.01 (ref 1–1.03)
SQUAMOUS #/AREA URNS HPF: ABNORMAL /HPF
TSH SERPL DL<=0.05 MIU/L-ACNC: 2.47 UIU/ML (ref 0.27–4.2)
URATE SERPL-MCNC: 8.5 MG/DL (ref 2.4–5.7)
UROBILINOGEN UR QL STRIP: ABNORMAL
WBC NRBC COR # BLD: 9.14 10*3/MM3 (ref 3.4–10.8)
WBC UR QL AUTO: ABNORMAL /HPF

## 2020-02-06 PROCEDURE — 83520 IMMUNOASSAY QUANT NOS NONAB: CPT

## 2020-02-06 PROCEDURE — 86235 NUCLEAR ANTIGEN ANTIBODY: CPT

## 2020-02-06 PROCEDURE — 87186 SC STD MICRODIL/AGAR DIL: CPT

## 2020-02-06 PROCEDURE — 87088 URINE BACTERIA CULTURE: CPT

## 2020-02-06 PROCEDURE — 76775 US EXAM ABDO BACK WALL LIM: CPT

## 2020-02-06 PROCEDURE — 84550 ASSAY OF BLOOD/URIC ACID: CPT

## 2020-02-06 PROCEDURE — 86256 FLUORESCENT ANTIBODY TITER: CPT

## 2020-02-06 PROCEDURE — 82784 ASSAY IGA/IGD/IGG/IGM EACH: CPT

## 2020-02-06 PROCEDURE — 86334 IMMUNOFIX E-PHORESIS SERUM: CPT

## 2020-02-06 PROCEDURE — 86225 DNA ANTIBODY NATIVE: CPT

## 2020-02-06 PROCEDURE — 82306 VITAMIN D 25 HYDROXY: CPT

## 2020-02-06 PROCEDURE — 86160 COMPLEMENT ANTIGEN: CPT

## 2020-02-06 PROCEDURE — 85025 COMPLETE CBC W/AUTO DIFF WBC: CPT

## 2020-02-06 PROCEDURE — 87086 URINE CULTURE/COLONY COUNT: CPT

## 2020-02-06 PROCEDURE — 83516 IMMUNOASSAY NONANTIBODY: CPT

## 2020-02-06 PROCEDURE — 80053 COMPREHEN METABOLIC PANEL: CPT

## 2020-02-06 PROCEDURE — 82570 ASSAY OF URINE CREATININE: CPT

## 2020-02-06 PROCEDURE — 36415 COLL VENOUS BLD VENIPUNCTURE: CPT

## 2020-02-06 PROCEDURE — 83540 ASSAY OF IRON: CPT

## 2020-02-06 PROCEDURE — 82550 ASSAY OF CK (CPK): CPT

## 2020-02-06 PROCEDURE — 81001 URINALYSIS AUTO W/SCOPE: CPT

## 2020-02-06 PROCEDURE — 84443 ASSAY THYROID STIM HORMONE: CPT

## 2020-02-06 PROCEDURE — 84156 ASSAY OF PROTEIN URINE: CPT

## 2020-02-06 PROCEDURE — 86038 ANTINUCLEAR ANTIBODIES: CPT

## 2020-02-07 LAB
ANA SER QL: POSITIVE
C3 SERPL-MCNC: 155 MG/DL (ref 82–167)
C4 SERPL-MCNC: 31 MG/DL (ref 14–44)
DSDNA AB SER-ACNC: <1 IU/ML (ref 0–9)

## 2020-02-08 LAB
BACTERIA SPEC AEROBE CULT: ABNORMAL
C-ANCA TITR SER IF: NORMAL TITER
MYELOPEROXIDASE AB SER-ACNC: <9 U/ML (ref 0–9)
P-ANCA ATYPICAL TITR SER IF: NORMAL TITER
P-ANCA TITR SER IF: NORMAL TITER
PROTEINASE3 AB SER IA-ACNC: <3.5 U/ML (ref 0–3.5)

## 2020-02-10 LAB
CENTROMERE B AB SER-ACNC: <0.2 AI (ref 0–0.9)
CHROMATIN AB SERPL-ACNC: <0.2 AI (ref 0–0.9)
DSDNA AB SER-ACNC: <1 IU/ML (ref 0–9)
ENA JO1 AB SER-ACNC: <0.2 AI (ref 0–0.9)
ENA RNP AB SER-ACNC: <0.2 AI (ref 0–0.9)
ENA SCL70 AB SER-ACNC: <0.2 AI (ref 0–0.9)
ENA SM AB SER-ACNC: <0.2 AI (ref 0–0.9)
ENA SS-A AB SER-ACNC: 5.8 AI (ref 0–0.9)
ENA SS-B AB SER-ACNC: <0.2 AI (ref 0–0.9)
IGA SERPL-MCNC: 173 MG/DL (ref 64–422)
IGG SERPL-MCNC: 966 MG/DL (ref 700–1600)
IGM SERPL-MCNC: 76 MG/DL (ref 26–217)
Lab: ABNORMAL
PROT PATTERN SERPL IFE-IMP: NORMAL
RIBOSOMAL P AB SER-ACNC: <0.2 AI (ref 0–0.9)
RIBOSOMAL P AB SER-ACNC: <0.2 AI (ref 0–0.9)

## 2020-02-17 ENCOUNTER — TELEPHONE (OUTPATIENT)
Dept: ONCOLOGY | Facility: CLINIC | Age: 80
End: 2020-02-17

## 2020-02-17 NOTE — TELEPHONE ENCOUNTER
Pt to call back to reschedule her appt with dr boland once she can find out from her kids when they can take her

## 2020-03-16 ENCOUNTER — TELEPHONE (OUTPATIENT)
Dept: ONCOLOGY | Facility: CLINIC | Age: 80
End: 2020-03-16

## 2020-03-16 NOTE — TELEPHONE ENCOUNTER
PER RASHEL AT THE OFFICE, PLEASE SEND ENCOUNTER, VERY BUSY.  PT CALLED TO CANCEL HER 3/18/20  DR GAYTAN  11:40  APPT. DOES NOT WANT TO ANABEL AT THIS TIME.

## 2020-03-18 ENCOUNTER — APPOINTMENT (OUTPATIENT)
Dept: LAB | Facility: HOSPITAL | Age: 80
End: 2020-03-18

## 2020-04-29 ENCOUNTER — OFFICE (AMBULATORY)
Dept: URBAN - METROPOLITAN AREA CLINIC 64 | Facility: CLINIC | Age: 80
End: 2020-04-29

## 2020-04-29 VITALS — HEIGHT: 66 IN

## 2020-04-29 DIAGNOSIS — Z85.038 PERSONAL HISTORY OF OTHER MALIGNANT NEOPLASM OF LARGE INTEST: ICD-10-CM

## 2020-04-29 DIAGNOSIS — R10.32 LEFT LOWER QUADRANT PAIN: ICD-10-CM

## 2020-04-29 PROCEDURE — 99213 OFFICE O/P EST LOW 20 MIN: CPT | Mod: 95 | Performed by: INTERNAL MEDICINE

## 2020-04-29 RX ORDER — METRONIDAZOLE 500 MG/1
1500 TABLET, FILM COATED ORAL
Qty: 30 | Refills: 0 | Status: COMPLETED
Start: 2020-04-29 | End: 2020-06-01

## 2020-04-29 RX ORDER — CEFUROXIME AXETIL 500 MG/1
1000 TABLET, FILM COATED ORAL
Qty: 20 | Refills: 0 | Status: COMPLETED
Start: 2020-04-29 | End: 2020-06-01

## 2020-04-29 RX ORDER — SUCRALFATE 1 G/1
3 TABLET ORAL
Qty: 90 | Refills: 12 | Status: COMPLETED
End: 2020-04-29

## 2020-06-01 ENCOUNTER — OFFICE (AMBULATORY)
Dept: URBAN - METROPOLITAN AREA CLINIC 64 | Facility: CLINIC | Age: 80
End: 2020-06-01

## 2020-06-01 VITALS — HEIGHT: 66 IN

## 2020-06-01 DIAGNOSIS — R14.0 ABDOMINAL DISTENSION (GASEOUS): ICD-10-CM

## 2020-06-01 DIAGNOSIS — K57.32 DIVERTICULITIS OF LARGE INTESTINE WITHOUT PERFORATION OR ABS: ICD-10-CM

## 2020-06-01 DIAGNOSIS — Z85.038 PERSONAL HISTORY OF OTHER MALIGNANT NEOPLASM OF LARGE INTEST: ICD-10-CM

## 2020-06-01 PROCEDURE — 99213 OFFICE O/P EST LOW 20 MIN: CPT | Mod: 95 | Performed by: INTERNAL MEDICINE

## 2020-06-01 RX ORDER — ALPHA-D-GALACTOSIDASE 400 UNIT
TABLET ORAL
Qty: 100 | Refills: 11 | Status: COMPLETED
Start: 2020-06-01 | End: 2020-08-10

## 2020-06-08 RX ORDER — RIVAROXABAN 20 MG/1
TABLET, FILM COATED ORAL
Qty: 90 TABLET | Refills: 3 | Status: SHIPPED | OUTPATIENT
Start: 2020-06-08 | End: 2021-06-03

## 2020-06-11 ENCOUNTER — OFFICE VISIT (OUTPATIENT)
Dept: CARDIOLOGY | Facility: CLINIC | Age: 80
End: 2020-06-11

## 2020-06-11 VITALS
WEIGHT: 259 LBS | BODY MASS INDEX: 41.62 KG/M2 | HEART RATE: 65 BPM | DIASTOLIC BLOOD PRESSURE: 83 MMHG | OXYGEN SATURATION: 98 % | SYSTOLIC BLOOD PRESSURE: 168 MMHG | HEIGHT: 66 IN

## 2020-06-11 DIAGNOSIS — I25.118 CORONARY ARTERY DISEASE OF NATIVE ARTERY OF NATIVE HEART WITH STABLE ANGINA PECTORIS (HCC): ICD-10-CM

## 2020-06-11 DIAGNOSIS — E78.00 PURE HYPERCHOLESTEROLEMIA: ICD-10-CM

## 2020-06-11 DIAGNOSIS — G47.33 OBSTRUCTIVE SLEEP APNEA SYNDROME: ICD-10-CM

## 2020-06-11 DIAGNOSIS — N18.4 CHRONIC KIDNEY DISEASE (CKD), STAGE IV (SEVERE) (HCC): ICD-10-CM

## 2020-06-11 DIAGNOSIS — I73.9 PERIPHERAL VASCULAR DISEASE (HCC): ICD-10-CM

## 2020-06-11 DIAGNOSIS — I10 ESSENTIAL HYPERTENSION: ICD-10-CM

## 2020-06-11 DIAGNOSIS — I48.20 CHRONIC ATRIAL FIBRILLATION (HCC): Primary | ICD-10-CM

## 2020-06-11 PROCEDURE — 99214 OFFICE O/P EST MOD 30 MIN: CPT | Performed by: INTERNAL MEDICINE

## 2020-06-11 NOTE — PROGRESS NOTES
Subjective:     Encounter Date:06/11/2020      Patient ID: Yasmeen Rahman is a 79 y.o. female.    Chief Complaint:  History of Present Illness 79-year-old white female with history of coronary artery disease history of atrial fibrillation chronic renal sufficiency hypertension hyperlipidemia obstructive sleep apnea presents to my office for follow-up.  Patient is currently stable without symptoms of chest pain or shortness of breath at rest on exertion.  No complaints of any PND orthopnea.  No palpitation dizziness syncope or swelling of the feet.  She has significant gastric reflux and is on medical therapy and follow with a gastroenterologist.  She is not able to exercise very well.  She does not smoke she is taking her medicines regularly.    The following portions of the patient's history were reviewed and updated as appropriate: allergies, current medications, past family history, past medical history, past social history, past surgical history and problem list.  Past Medical History:   Diagnosis Date   • Anemia 2018   • Anxiety 2018   • Arthritis    • Atrial fibrillation (CMS/HCC)    • CAD (coronary artery disease) 2013   • CKD (chronic kidney disease) 2019   • Colon cancer (CMS/HCC)    • GERD (gastroesophageal reflux disease) 2009   • Gout 2009   • High cholesterol 1989   • Hypertension 1989   • Hypothyroidism 2004   • Lupus (CMS/HCC)    • PHOENIX (obstructive sleep apnea)    • Osteopenia 2015   • Osteoporosis 2004   • PONV (postoperative nausea and vomiting)    • Rheumatoid arthritis (CMS/HCC) 2009     Past Surgical History:   Procedure Laterality Date   • CARDIAC CATHETERIZATION      no stents   • CHOLECYSTECTOMY  1990    laparoscopic    • COLON RESECTION Right 9/5/2019    Procedure: Open right hemicolectomy;  Surgeon: Dominick Cunha DO;  [facility]: [facility];  Service: General   • COLONOSCOPY     • HEEL SPUR EXCISION Right 1994   • HYSTERECTOMY  1994    total due to heavy beeding and fibroids  "  • JOINT REPLACEMENT     • TONSILLECTOMY  1962   • TOTAL HIP ARTHROPLASTY Right 2010     /83   Pulse 65   Ht 167.6 cm (66\")   Wt 117 kg (259 lb)   LMP  (LMP Unknown)   SpO2 98%   BMI 41.80 kg/m²   Family History   Problem Relation Age of Onset   • Colon cancer Brother 71       Current Outpatient Medications:   •  acetaminophen (TYLENOL 8 HOUR ARTHRITIS PAIN) 650 MG 8 hr tablet, Take 650 mg by mouth Every 8 (Eight) Hours As Needed for Mild Pain ., Disp: , Rfl:   •  allopurinol (ZYLOPRIM) 100 MG tablet, Take  by mouth Daily., Disp: , Rfl:   •  aspirin 81 MG tablet, Take 81 mg by mouth Daily., Disp: , Rfl:   •  b complex vitamins tablet, B COMPLEX TABS, Disp: , Rfl:   •  bumetanide (BUMEX) 2 MG tablet, Take 1 mg by mouth Daily., Disp: , Rfl:   •  Calcium Carbonate-Vitamin D (CALCIUM 600+D) 600-200 MG-UNIT tablet, Take 1,200 mg by mouth Daily., Disp: , Rfl:   •  Cholecalciferol (VITAMIN D3) 2000 units capsule, VITAMIN D3 2000 UNIT CAPS, Disp: , Rfl:   •  CloNIDine (CATAPRES) 0.1 MG tablet, TAKE 1 TABLET TWICE A DAY, Disp: , Rfl:   •  colestipol (COLESTID) 1 g tablet, Take 2 g by mouth 2 (Two) Times a Day., Disp: , Rfl: 1  •  cyanocobalamin (VITAMIN B-12) 2500 MCG tablet tablet, 1000 mcg, Disp: , Rfl:   •  escitalopram (LEXAPRO) 10 MG tablet, Take 10 mg by mouth Daily., Disp: , Rfl:   •  ferrous sulfate 325 (65 FE) MG tablet, TAKE 1 TABLET BY MOUTh DAILY WITH MEALS, Disp: , Rfl:   •  gabapentin (NEURONTIN) 100 MG capsule, Take 200 mg by mouth Every Morning., Disp: , Rfl:   •  gabapentin (NEURONTIN) 300 MG capsule, Take 300 mg by mouth Every Night., Disp: , Rfl:   •  hydroxychloroquine (PLAQUENIL) 200 MG tablet, , Disp: , Rfl:   •  irbesartan (AVAPRO) 300 MG tablet, , Disp: , Rfl:   •  levothyroxine (SYNTHROID, LEVOTHROID) 100 MCG tablet, Take 100 mcg by mouth Daily., Disp: , Rfl:   •  metoprolol tartrate (LOPRESSOR) 100 MG tablet, TAKE 1 TABLET TWICE A DAY, Disp: , Rfl:   •  pantoprazole (PROTONIX) 40 MG EC " tablet, Take  by mouth Daily., Disp: , Rfl:   •  potassium chloride (K-DUR) 10 MEQ CR tablet, 10 mEq Daily., Disp: , Rfl:   •  pravastatin (PRAVACHOL) 40 MG tablet, Take 40 mg by mouth Daily., Disp: , Rfl:   •  sucralfate (CARAFATE) 1 g tablet, TK 1 T PO TID 1 HOUR B MEALS AND HS OES, Disp: , Rfl:   •  vitamin C (ASCORBIC ACID) 500 MG tablet, Take 500 mg by mouth Daily., Disp: , Rfl:   •  XARELTO 20 MG tablet, TAKE 1 TABLET DAILY, Disp: 90 tablet, Rfl: 3  Allergies   Allergen Reactions   • Codeine Nausea And Vomiting   • Hydrocodone-Acetaminophen Nausea And Vomiting     Nausea,vomiting     Social History     Socioeconomic History   • Marital status:      Spouse name: Not on file   • Number of children: 4   • Years of education: Not on file   • Highest education level: Not on file   Occupational History   • Occupation: Retired    Tobacco Use   • Smoking status: Never Smoker   • Smokeless tobacco: Never Used   Substance and Sexual Activity   • Alcohol use: No     Frequency: Never   • Drug use: No   • Sexual activity: Defer     Review of Systems   Constitution: Negative for fever and malaise/fatigue.   HENT: Negative for ear pain and nosebleeds.    Eyes: Negative for blurred vision and double vision.   Cardiovascular: Negative for chest pain, dyspnea on exertion, leg swelling and palpitations.   Respiratory: Negative for cough and shortness of breath.    Skin: Negative for rash.   Musculoskeletal: Negative for joint pain.   Gastrointestinal: Negative for abdominal pain, nausea and vomiting.   Neurological: Positive for numbness. Negative for focal weakness, headaches and light-headedness.   Psychiatric/Behavioral: Negative for depression. The patient is not nervous/anxious.    All other systems reviewed and are negative.             Objective:     Physical Exam   Constitutional: She appears well-developed and well-nourished.   HENT:   Head: Normocephalic and atraumatic.   Eyes: Pupils are equal, round,  and reactive to light. Conjunctivae and EOM are normal. No scleral icterus.   Neck: Normal range of motion. Neck supple. No JVD present. Carotid bruit is not present.   Cardiovascular: Normal rate, regular rhythm, S1 normal, S2 normal and intact distal pulses. PMI is not displaced.   Murmur heard.  Pulmonary/Chest: Effort normal and breath sounds normal. She has no wheezes. She has no rales.   Abdominal: Soft. Bowel sounds are normal.   Musculoskeletal: Normal range of motion.   Neurological: She is alert. She has normal strength.   No focal deficits   Skin: Skin is warm and dry. No rash noted.   Psychiatric: She has a normal mood and affect.     Procedures    Lab Review:       Assessment:          Diagnosis Plan   1. Chronic atrial fibrillation (CMS/[facility])     2. Pure hypercholesterolemia     3. Essential hypertension     4. Peripheral vascular disease (CMS/[facility])     5. Coronary artery disease of native artery of native heart with stable angina pectoris (CMS/[facility])     6. Obstructive sleep apnea syndrome     7. Chronic kidney disease (CKD), stage IV (severe) (CMS/[facility])            Plan:       Patient has history of coronary disease and is currently stable on medical therapy without any angina  Patient has normal LV systolic function  Patient has peripheral vascular disease but does not have any significant claudication  Patient's blood pressure is slightly high and I will adjust medicines accordingly  Patient lipid levels are followed by primary care doctor  Patient has sleep apnea and uses CPAP machine  Patient has chronic renal insufficiency and is followed by nephrologist  Patient also has atrial fibrillation and is on current medical therapy with metoprolol and Xarelto  Continue current medicines and follow-up in 6

## 2020-06-12 ENCOUNTER — APPOINTMENT (OUTPATIENT)
Dept: CT IMAGING | Facility: HOSPITAL | Age: 80
End: 2020-06-12

## 2020-06-12 ENCOUNTER — HOSPITAL ENCOUNTER (EMERGENCY)
Facility: HOSPITAL | Age: 80
Discharge: HOME | End: 2020-06-12
Admitting: EMERGENCY MEDICINE

## 2020-06-12 ENCOUNTER — APPOINTMENT (OUTPATIENT)
Dept: GENERAL RADIOLOGY | Facility: HOSPITAL | Age: 80
End: 2020-06-12

## 2020-06-12 VITALS
BODY MASS INDEX: 40.6 KG/M2 | TEMPERATURE: 98 F | OXYGEN SATURATION: 100 % | HEIGHT: 66 IN | SYSTOLIC BLOOD PRESSURE: 172 MMHG | RESPIRATION RATE: 16 BRPM | WEIGHT: 252.65 LBS | DIASTOLIC BLOOD PRESSURE: 88 MMHG | HEART RATE: 72 BPM
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
## 2020-06-12 DIAGNOSIS — S05.12XA ECCHYMOSIS OF LEFT EYE, INITIAL ENCOUNTER: ICD-10-CM

## 2020-06-12 DIAGNOSIS — S80.811A ABRASION OF RIGHT LOWER EXTREMITY, INITIAL ENCOUNTER: ICD-10-CM

## 2020-06-12 DIAGNOSIS — S80.11XA LEG HEMATOMA, RIGHT, INITIAL ENCOUNTER: ICD-10-CM

## 2020-06-12 PROCEDURE — 70450 CT HEAD/BRAIN W/O DYE: CPT

## 2020-06-12 PROCEDURE — 73562 X-RAY EXAM OF KNEE 3: CPT

## 2020-06-12 PROCEDURE — 73590 X-RAY EXAM OF LOWER LEG: CPT

## 2020-06-12 PROCEDURE — 99282 EMERGENCY DEPT VISIT SF MDM: CPT

## 2020-06-13 NOTE — DISCHARGE INSTRUCTIONS
Return to the ER for any worsening symptoms.  Ice and elevate leg while at home.  Follow-up with your primary care doctor or orthopedist for any further management. May take Tylenol at home
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
## 2020-06-15 PROBLEM — D50.0 IRON DEFICIENCY ANEMIA SECONDARY TO BLOOD LOSS (CHRONIC): Status: ACTIVE | Noted: 2019-08-07

## 2020-06-15 PROBLEM — K31.7 POLYP OF STOMACH AND DUODENUM: Status: ACTIVE | Noted: 2019-08-07

## 2020-06-15 PROBLEM — C18.2 MALIGNANT NEOPLASM OF ASCENDING COLON: Status: ACTIVE | Noted: 2019-08-07

## 2020-06-16 ENCOUNTER — TELEPHONE (OUTPATIENT)
Dept: CARDIOLOGY | Facility: CLINIC | Age: 80
End: 2020-06-16

## 2020-06-16 NOTE — TELEPHONE ENCOUNTER
Dr. Maradiaga  Phone [phone] Fax [phone] Attn: Magalie  EGD/Colonoscopy 8/11/2020  Hold Xarelto 2 days prior   LOV 6/11/2020

## 2020-07-25 ENCOUNTER — HOSPITAL ENCOUNTER (EMERGENCY)
Facility: HOSPITAL | Age: 80
Discharge: HOME | End: 2020-07-25
Attending: EMERGENCY MEDICINE | Admitting: EMERGENCY MEDICINE

## 2020-07-25 ENCOUNTER — APPOINTMENT (OUTPATIENT)
Dept: CT IMAGING | Facility: HOSPITAL | Age: 80
End: 2020-07-25

## 2020-07-25 VITALS
WEIGHT: 263.01 LBS | SYSTOLIC BLOOD PRESSURE: 161 MMHG | HEIGHT: 66 IN | BODY MASS INDEX: 42.27 KG/M2 | OXYGEN SATURATION: 100 % | HEART RATE: 60 BPM | TEMPERATURE: 97.9 F | DIASTOLIC BLOOD PRESSURE: 85 MMHG | RESPIRATION RATE: 20 BRPM

## 2020-07-25 DIAGNOSIS — R31.9 HEMATURIA, UNSPECIFIED TYPE: Primary | ICD-10-CM

## 2020-07-25 DIAGNOSIS — N30.01 ACUTE CYSTITIS WITH HEMATURIA: ICD-10-CM

## 2020-07-25 LAB
ALBUMIN SERPL-MCNC: 4.3 G/DL (ref 3.5–5.2)
ALBUMIN/GLOB SERPL: 1.5 G/DL
ALP SERPL-CCNC: 102 U/L (ref 39–117)
ALT SERPL W P-5'-P-CCNC: 15 U/L (ref 1–33)
ANION GAP SERPL CALCULATED.3IONS-SCNC: 10 MMOL/L (ref 5–15)
APTT PPP: 29.6 SECONDS (ref 24–31)
AST SERPL-CCNC: 19 U/L (ref 1–32)
BACTERIA UR QL AUTO: ABNORMAL /HPF
BASOPHILS # BLD AUTO: 0 10*3/MM3 (ref 0–0.2)
BASOPHILS NFR BLD AUTO: 0.3 % (ref 0–1.5)
BILIRUB SERPL-MCNC: 0.8 MG/DL (ref 0–1.2)
BILIRUB UR QL STRIP: ABNORMAL
BUN SERPL-MCNC: 15 MG/DL (ref 8–23)
BUN SERPL-MCNC: ABNORMAL MG/DL
BUN/CREAT SERPL: ABNORMAL
CALCIUM SPEC-SCNC: 9.4 MG/DL (ref 8.6–10.5)
CHLORIDE SERPL-SCNC: 93 MMOL/L (ref 98–107)
CLARITY UR: ABNORMAL
CO2 SERPL-SCNC: 30 MMOL/L (ref 22–29)
COLOR UR: ABNORMAL
CREAT SERPL-MCNC: 0.85 MG/DL (ref 0.57–1)
DEPRECATED RDW RBC AUTO: 51.6 FL (ref 37–54)
EOSINOPHIL # BLD AUTO: 0.1 10*3/MM3 (ref 0–0.4)
EOSINOPHIL NFR BLD AUTO: 1.6 % (ref 0.3–6.2)
ERYTHROCYTE [DISTWIDTH] IN BLOOD BY AUTOMATED COUNT: 15.8 % (ref 12.3–15.4)
GFR SERPL CREATININE-BSD FRML MDRD: 65 ML/MIN/1.73
GLOBULIN UR ELPH-MCNC: 2.8 GM/DL
GLUCOSE SERPL-MCNC: 117 MG/DL (ref 65–99)
GLUCOSE UR STRIP-MCNC: NEGATIVE MG/DL
HCT VFR BLD AUTO: 35.7 % (ref 34–46.6)
HGB BLD-MCNC: 12.1 G/DL (ref 12–15.9)
HGB UR QL STRIP.AUTO: ABNORMAL
HOLD SPECIMEN: NORMAL
HYALINE CASTS UR QL AUTO: ABNORMAL /LPF
INR PPP: 1.36 (ref 0.9–1.1)
KETONES UR QL STRIP: ABNORMAL
LEUKOCYTE ESTERASE UR QL STRIP.AUTO: ABNORMAL
LYMPHOCYTES # BLD AUTO: 0.9 10*3/MM3 (ref 0.7–3.1)
LYMPHOCYTES NFR BLD AUTO: 11.8 % (ref 19.6–45.3)
MCH RBC QN AUTO: 32.2 PG (ref 26.6–33)
MCHC RBC AUTO-ENTMCNC: 33.8 G/DL (ref 31.5–35.7)
MCV RBC AUTO: 95.1 FL (ref 79–97)
MONOCYTES # BLD AUTO: 0.7 10*3/MM3 (ref 0.1–0.9)
MONOCYTES NFR BLD AUTO: 8.8 % (ref 5–12)
NEUTROPHILS NFR BLD AUTO: 6 10*3/MM3 (ref 1.7–7)
NEUTROPHILS NFR BLD AUTO: 77.5 % (ref 42.7–76)
NITRITE UR QL STRIP: POSITIVE
NRBC BLD AUTO-RTO: 0.1 /100 WBC (ref 0–0.2)
PH UR STRIP.AUTO: 6 [PH] (ref 5–8)
PLATELET # BLD AUTO: 227 10*3/MM3 (ref 140–450)
PMV BLD AUTO: 6.9 FL (ref 6–12)
POTASSIUM SERPL-SCNC: 4.9 MMOL/L (ref 3.5–5.2)
PROT SERPL-MCNC: 7.1 G/DL (ref 6–8.5)
PROT UR QL STRIP: ABNORMAL
PROTHROMBIN TIME: 13.6 SECONDS (ref 9.6–11.7)
RBC # BLD AUTO: 3.76 10*6/MM3 (ref 3.77–5.28)
RBC # UR: ABNORMAL /HPF
REF LAB TEST METHOD: ABNORMAL
SODIUM SERPL-SCNC: 133 MMOL/L (ref 136–145)
SP GR UR STRIP: 1.02 (ref 1–1.03)
SQUAMOUS #/AREA URNS HPF: ABNORMAL /HPF
UROBILINOGEN UR QL STRIP: ABNORMAL
WBC # BLD AUTO: 7.7 10*3/MM3 (ref 3.4–10.8)
WBC UR QL AUTO: ABNORMAL /HPF

## 2020-07-25 PROCEDURE — 81001 URINALYSIS AUTO W/SCOPE: CPT | Performed by: EMERGENCY MEDICINE

## 2020-07-25 PROCEDURE — 87086 URINE CULTURE/COLONY COUNT: CPT | Performed by: EMERGENCY MEDICINE

## 2020-07-25 PROCEDURE — 74176 CT ABD & PELVIS W/O CONTRAST: CPT

## 2020-07-25 PROCEDURE — P9612 CATHETERIZE FOR URINE SPEC: HCPCS

## 2020-07-25 PROCEDURE — 96374 THER/PROPH/DIAG INJ IV PUSH: CPT

## 2020-07-25 PROCEDURE — 85025 COMPLETE CBC W/AUTO DIFF WBC: CPT | Performed by: EMERGENCY MEDICINE

## 2020-07-25 PROCEDURE — 25010000002 CEFTRIAXONE PER 250 MG: Performed by: EMERGENCY MEDICINE

## 2020-07-25 PROCEDURE — 99283 EMERGENCY DEPT VISIT LOW MDM: CPT

## 2020-07-25 PROCEDURE — 85610 PROTHROMBIN TIME: CPT | Performed by: EMERGENCY MEDICINE

## 2020-07-25 PROCEDURE — 87186 SC STD MICRODIL/AGAR DIL: CPT | Performed by: EMERGENCY MEDICINE

## 2020-07-25 PROCEDURE — 85730 THROMBOPLASTIN TIME PARTIAL: CPT | Performed by: EMERGENCY MEDICINE

## 2020-07-25 PROCEDURE — 80053 COMPREHEN METABOLIC PANEL: CPT | Performed by: EMERGENCY MEDICINE

## 2020-07-25 PROCEDURE — 87088 URINE BACTERIA CULTURE: CPT | Performed by: EMERGENCY MEDICINE

## 2020-07-25 RX ORDER — CEFDINIR 300 MG/1
300 CAPSULE ORAL 2 TIMES DAILY
Qty: 14 CAPSULE | Refills: 0 | Status: SHIPPED | OUTPATIENT
Start: 2020-07-25 | End: 2020-12-23 | Stop reason: ALTCHOICE

## 2020-07-25 RX ORDER — LANOLIN ALCOHOL/MO/W.PET/CERES
1000 CREAM (GRAM) TOPICAL DAILY
COMMUNITY

## 2020-07-25 RX ORDER — SODIUM CHLORIDE 0.9 % (FLUSH) 0.9 %
10 SYRINGE (ML) INJECTION AS NEEDED
Status: DISCONTINUED | OUTPATIENT
Start: 2020-07-25 | End: 2020-07-25 | Stop reason: HOSPADM

## 2020-07-25 RX ADMIN — WATER 1 G: 1000 INJECTION, SOLUTION INTRAVENOUS at 10:33

## 2020-07-25 NOTE — DISCHARGE INSTRUCTIONS
Hold Xarelto for today and tomorrow.  Omnicef.  Follow-up with Dr. Figueredo on Monday.  Call your cardiologist about Xarelto and your medications concerning your heart rate.  Return for fever vomiting unable to urinate dizziness passing out severe pain or any other new or worse problems or concerns

## 2020-07-25 NOTE — ED PROVIDER NOTES
Subjective   Chief complaint woke up with bleeding from urine or vaginal area    History of present illness 80-year-old female said history of bladder tumor removed in the past that she woke up this morning and she had bleeding that she thinks is coming from her urine vaginal area.  She denied any rectal bleeding.  She has some mild pain to the right side.  No dizziness or syncope.  Patient states that it seems to be better now was only this morning when she woke up went to the bathroom she is on Xarelto for atrial fibrillation denies any recent change in medication or injury.  Nothing makes this better or worse and just started this morning no foreign travels no recent illness no recent antibiotic use or hospitalizations          Review of Systems   Constitutional: Negative for chills and fever.   HENT: Negative for congestion and sinus pressure.    Eyes: Negative for photophobia and visual disturbance.   Respiratory: Negative for chest tightness and shortness of breath.    Cardiovascular: Negative for chest pain and leg swelling.   Gastrointestinal: Negative for vomiting.   Endocrine: Negative for cold intolerance and heat intolerance.   Genitourinary: Positive for hematuria. Negative for difficulty urinating.   Musculoskeletal: Negative for arthralgias and back pain.   Skin: Negative for color change.   Neurological: Negative for dizziness and facial asymmetry.   Psychiatric/Behavioral: Negative for agitation and behavioral problems.       Past Medical History:   Diagnosis Date   • Anemia 2018   • Anxiety 2018   • Arthritis    • Atrial fibrillation (CMS/[facility])    • CAD (coronary artery disease) 2013   • CKD (chronic kidney disease) 2019   • Colon cancer (CMS/[facility]) 09/2019    right hemicolectomy   • GERD (gastroesophageal reflux disease) 2009   • Gout 2009   • High cholesterol 1989   • Hypertension 1989   • Hypothyroidism 2004   • Lupus (CMS/[facility])    • PHOENIX (obstructive sleep apnea)    • Osteopenia 2015   •  Osteoporosis 2004   • PONV (postoperative nausea and vomiting)    • Rheumatoid arthritis (CMS/HCC) 2009       Allergies   Allergen Reactions   • Codeine Nausea And Vomiting   • Hydrocodone-Acetaminophen Nausea And Vomiting     Nausea,vomiting       Past Surgical History:   Procedure Laterality Date   • BLADDER SURGERY  11/2019    tumor removed   • CARDIAC CATHETERIZATION      no stents   • CHOLECYSTECTOMY  1990    laparoscopic    • COLON RESECTION Right 9/5/2019    Procedure: Open right hemicolectomy;  Surgeon: Dominick Cunha DO;  Location: [facility] MAIN OR;  Service: General   • COLONOSCOPY     • HEEL SPUR EXCISION Right 1994   • HYSTERECTOMY  1994    total due to heavy beeding and fibroids   • JOINT REPLACEMENT     • TONSILLECTOMY  1962   • TOTAL HIP ARTHROPLASTY Right 2010       Family History   Problem Relation Age of Onset   • Colon cancer Brother 71       Social History     Socioeconomic History   • Marital status:      Spouse name: Not on file   • Number of children: 4   • Years of education: Not on file   • Highest education level: Not on file   Occupational History   • Occupation: Retired    Tobacco Use   • Smoking status: Never Smoker   • Smokeless tobacco: Never Used   Substance and Sexual Activity   • Alcohol use: No     Frequency: Never   • Drug use: No   • Sexual activity: Defer     Prior to Admission medications    Medication Sig Start Date End Date Taking? Authorizing Provider   acetaminophen (TYLENOL 8 HOUR ARTHRITIS PAIN) 650 MG 8 hr tablet Take 650 mg by mouth Every 8 (Eight) Hours As Needed for Mild Pain .    Jennifer Correia MD   allopurinol (ZYLOPRIM) 100 MG tablet Take  by mouth Daily. 12/30/19   Jennifer Correia MD   aspirin 81 MG tablet Take 81 mg by mouth Daily. 3/24/14   Jennifer Correia MD   b complex vitamins tablet B COMPLEX TABS 8/7/14   Jennifer Correia MD   bumetanide (BUMEX) 2 MG tablet Take 1 mg by mouth Daily. 6/13/19   Jennifer Correia MD    Calcium Carbonate-Vitamin D (CALCIUM 600+D) 600-200 MG-UNIT tablet Take 1,200 mg by mouth Daily.    Jennifer Correia MD   Cholecalciferol (VITAMIN D3) 2000 units capsule VITAMIN D3 2000 UNIT CAPS 3/24/14   Jennifer Correia MD   CloNIDine (CATAPRES) 0.1 MG tablet TAKE 1 TABLET TWICE A DAY 3/24/14   Jennifer Correia MD   colestipol (COLESTID) 1 g tablet Take 2 g by mouth 2 (Two) Times a Day. 9/12/19   Jennifer Correia MD   cyanocobalamin (VITAMIN B-12) 2500 MCG tablet tablet 1000 mcg 3/24/14   Jennifer Correia MD   escitalopram (LEXAPRO) 10 MG tablet Take 10 mg by mouth Daily. 4/30/19   Jennifer Correai MD   ferrous sulfate 325 (65 FE) MG tablet TAKE 1 TABLET BY MOUTh DAILY WITH MEALS 10/12/18   Jennifer Correia MD   gabapentin (NEURONTIN) 100 MG capsule Take 200 mg by mouth Every Morning.    Jennifer Correia MD   gabapentin (NEURONTIN) 300 MG capsule Take 300 mg by mouth Every Night.    Jennifer Correia MD   hydroxychloroquine (PLAQUENIL) 200 MG tablet  10/17/19   Jennifer Correia MD   irbesartan (AVAPRO) 300 MG tablet  12/30/19   Jennifer Correia MD   levothyroxine (SYNTHROID, LEVOTHROID) 100 MCG tablet Take 100 mcg by mouth Daily.    Jennifer Correia MD   metoprolol tartrate (LOPRESSOR) 100 MG tablet TAKE 1 TABLET TWICE A DAY 3/24/14   Jennifer Correia MD   pantoprazole (PROTONIX) 40 MG EC tablet Take  by mouth Daily. 12/11/19   Jennifer Correia MD   potassium chloride (K-DUR) 10 MEQ CR tablet 10 mEq Daily. 6/13/19   Jennifer Correia MD   pravastatin (PRAVACHOL) 40 MG tablet Take 40 mg by mouth Daily. 3/24/14   Jennifer Correia MD   sucralfate (CARAFATE) 1 g tablet TK 1 T PO TID 1 HOUR B MEALS AND HS OES 12/30/19   Jennifer Correia MD   vitamin C (ASCORBIC ACID) 500 MG tablet Take 500 mg by mouth Daily.    Jennifer Correia MD   XARELTO 20 MG tablet TAKE 1 TABLET DAILY 6/8/20   Trever Mtz MD           Objective   Physical  Exam  80-year-old female awake alert no acute distress HEENT extraocular muscles intact pupils equal and reactive mouth clear neck supple no adenopathy no JVD no meningeal signs lungs clear no retraction no CVA tenderness heart regular without murmur abdomen soft nontender good bowel sounds no peritoneal findings or pulsatile masses rectal exam no masses tenderness heme-negative stool no gross blood.  No blood in the vaginal vault.  Extremities no edema cords or Homans sign or evidence of DVT.  Patient's awake alert follows commands motor strength normal without focal weakness  Procedures           ED Course       Results for orders placed or performed during the hospital encounter of 07/25/20   Comprehensive Metabolic Panel   Result Value Ref Range    Glucose 117 (H) 65 - 99 mg/dL    BUN      Creatinine 0.85 0.57 - 1.00 mg/dL    Sodium 133 (L) 136 - 145 mmol/L    Potassium 4.9 3.5 - 5.2 mmol/L    Chloride 93 (L) 98 - 107 mmol/L    CO2 30.0 (H) 22.0 - 29.0 mmol/L    Calcium 9.4 8.6 - 10.5 mg/dL    Total Protein 7.1 6.0 - 8.5 g/dL    Albumin 4.30 3.50 - 5.20 g/dL    ALT (SGPT) 15 1 - 33 U/L    AST (SGOT) 19 1 - 32 U/L    Alkaline Phosphatase 102 39 - 117 U/L    Total Bilirubin 0.8 0.0 - 1.2 mg/dL    eGFR Non African Amer 65 >60 mL/min/1.73    Globulin 2.8 gm/dL    A/G Ratio 1.5 g/dL    BUN/Creatinine Ratio      Anion Gap 10.0 5.0 - 15.0 mmol/L   Protime-INR   Result Value Ref Range    Protime 13.6 (H) 9.6 - 11.7 Seconds    INR 1.36 (H) 0.90 - 1.10   aPTT   Result Value Ref Range    PTT 29.6 24.0 - 31.0 seconds   Urinalysis With Culture If Indicated - Urine, Catheter In/Out   Result Value Ref Range    Color, UA Red (A) Yellow, Straw    Appearance, UA Turbid (A) Clear    pH, UA 6.0 5.0 - 8.0    Specific Gravity, UA 1.016 1.005 - 1.030    Glucose, UA Negative Negative    Ketones, UA 40 mg/dL (2+) (A) Negative    Bilirubin, UA Large (3+) (A) Negative    Blood, UA Large (3+) (A) Negative    Protein, UA >=300 mg/dL (3+)  (A) Negative    Leuk Esterase, UA Large (3+) (A) Negative    Nitrite, UA Positive (A) Negative    Urobilinogen, UA 1.0 E.U./dL 0.2 - 1.0 E.U./dL   CBC Auto Differential   Result Value Ref Range    WBC 7.70 3.40 - 10.80 10*3/mm3    RBC 3.76 (L) 3.77 - 5.28 10*6/mm3    Hemoglobin 12.1 12.0 - 15.9 g/dL    Hematocrit 35.7 34.0 - 46.6 %    MCV 95.1 79.0 - 97.0 fL    MCH 32.2 26.6 - 33.0 pg    MCHC 33.8 31.5 - 35.7 g/dL    RDW 15.8 (H) 12.3 - 15.4 %    RDW-SD 51.6 37.0 - 54.0 fl    MPV 6.9 6.0 - 12.0 fL    Platelets 227 140 - 450 10*3/mm3    Neutrophil % 77.5 (H) 42.7 - 76.0 %    Lymphocyte % 11.8 (L) 19.6 - 45.3 %    Monocyte % 8.8 5.0 - 12.0 %    Eosinophil % 1.6 0.3 - 6.2 %    Basophil % 0.3 0.0 - 1.5 %    Neutrophils, Absolute 6.00 1.70 - 7.00 10*3/mm3    Lymphocytes, Absolute 0.90 0.70 - 3.10 10*3/mm3    Monocytes, Absolute 0.70 0.10 - 0.90 10*3/mm3    Eosinophils, Absolute 0.10 0.00 - 0.40 10*3/mm3    Basophils, Absolute 0.00 0.00 - 0.20 10*3/mm3    nRBC 0.1 0.0 - 0.2 /100 WBC   BUN   Result Value Ref Range    BUN 15 8 - 23 mg/dL   Urinalysis, Microscopic Only - Urine, Catheter In/Out   Result Value Ref Range    RBC, UA Too Numerous to Count (A) None Seen /HPF    WBC, UA Too Numerous to Count (A) None Seen /HPF    Bacteria, UA Unable to determine due to loaded field (A) None Seen /HPF    Squamous Epithelial Cells, UA Unable to determine due to loaded field (A) None Seen, 0-2 /HPF    Hyaline Casts, UA None Seen None Seen /LPF    Methodology Manual Light Microscopy    [facility] - [facility]   Result Value Ref Range    Extra Tube Hold for add-ons.      Ct Abdomen Pelvis Stone Protocol    Result Date: 7/25/2020   1. No acute intra-abdominal or intrapelvic process. No evidence of stones or hydronephrosis. 2. Ancillary findings as described above.    Electronically Signed By-Piedad Bui On:7/25/2020 9:15 AM This report was finalized on [id number] by  Piedad Bui, .    Medications   sodium chloride 0.9 % flush 10 mL (has no  administration in time range)   cefTRIAXone (ROCEPHIN) in SWFI 1 gram/10ml IV PUSH syringe (has no administration in time range)                                            MDM  Number of Diagnoses or Management Options  Acute cystitis with hematuria:   Hematuria, unspecified type:   Diagnosis management comments: Medical decision making.  Patient IV status placed on monitor and had the above exam and evaluation CBC was normal white count 7.9 hemoglobin is 12.1 cath urine sample was grossly bloody to numerous count red cells too numerous to count white cells patient had nitrate positive leukocyte positive chemistries unremarkable CT stone protocol was negative for any acute disease.  The patient on repeat exam was resting comfortably abdomen was soft without tenderness no peritoneal findings.  Patient desires to go home as we talked about outpatient versus inpatient.  We talked about the potential worsening and risk factors.  She voiced understanding I talked to Dr. Rodriguez I talked to Derick Acevedo on-call for Dr. Figueredo.  She was given Rocephin 1 g IV and she will be discharged home for outpatient management.  Patient clinically looks well and will see any evidence to suggest sepsis by her exam      Final diagnoses:   Hematuria, unspecified type   Acute cystitis with hematuria            Dominick Carson MD  07/25/20 1002

## 2020-07-25 NOTE — ED NOTES
"Patient presents to ED with complaints of blood in urine upon first void. She complains of right sided abdominal pain with palpation. She states she feels like \"There is something in there that hurts. I can feel it.\" Reports taking blood thinners regularly. Sees several local specialist: Dr. Yan, Dr. Dyer, Dr. Figueredo, and Dr. Rodriguez as her PCP. She reports having surgery to remove a mass from her bladder that was found to be non-cancerous. Also reports a history of colon cancer.      Esha Olmedo, RN  07/25/20 0857    "

## 2020-07-27 ENCOUNTER — TELEPHONE (OUTPATIENT)
Dept: CARDIOLOGY | Facility: CLINIC | Age: 80
End: 2020-07-27

## 2020-07-27 LAB — BACTERIA SPEC AEROBE CULT: ABNORMAL

## 2020-07-27 NOTE — PROGRESS NOTES
Patient urine culture resulted with E. coli. Susceptible to Cephalosporins (3rd gen). Patient was given Rx for cefdinir. Therapy is appropriate coverage. No further follow-up required..    No results found for: ACANTHNAEG, AFBCX, BPERTUSSISCX, BLOODCX  No results found for: BCIDPCR, CXREFLEX, CSFCX, CULTURETIS  No results found for: CULTURES, HSVCX, URCX  No results found for: EYECULTURE, GCCX, LABHSV  No results found for: LEGIONELLA, MRSACX, MUMPSCX, MYCOPLASCX  No results found for: NOCARDIACX, STOOLCX  Urine Culture       Date                     Value               Ref Range           Status                07/25/2020                                                       Final             >100,000 CFU/mL Escherichia coli (A)  ----------  No results found for: VIRALCULTU, WOUNDCX    Morelia Mendez, PharmD  7/27/2020 07:54

## 2020-07-27 NOTE — TELEPHONE ENCOUNTER
Patient was @ [facility] for bleeding of bladder. Her pulse was low, 39. ER doctor took her off Xarelto saturday & Sunday. Daughter asking if she can resume Xarelto. Made apt for this Thursday.

## 2020-07-30 ENCOUNTER — OFFICE VISIT (OUTPATIENT)
Dept: CARDIOLOGY | Facility: CLINIC | Age: 80
End: 2020-07-30

## 2020-07-30 VITALS — WEIGHT: 265 LBS | HEIGHT: 66 IN | OXYGEN SATURATION: 99 % | BODY MASS INDEX: 42.59 KG/M2 | HEART RATE: 61 BPM

## 2020-07-30 DIAGNOSIS — I10 ESSENTIAL HYPERTENSION: ICD-10-CM

## 2020-07-30 DIAGNOSIS — E78.00 PURE HYPERCHOLESTEROLEMIA: ICD-10-CM

## 2020-07-30 DIAGNOSIS — I73.9 PERIPHERAL VASCULAR DISEASE (HCC): ICD-10-CM

## 2020-07-30 DIAGNOSIS — I48.20 CHRONIC ATRIAL FIBRILLATION (HCC): Primary | ICD-10-CM

## 2020-07-30 DIAGNOSIS — N18.4 CHRONIC KIDNEY DISEASE (CKD), STAGE IV (SEVERE) (HCC): ICD-10-CM

## 2020-07-30 DIAGNOSIS — I25.10 CORONARY ARTERY DISEASE INVOLVING NATIVE CORONARY ARTERY OF NATIVE HEART WITHOUT ANGINA PECTORIS: ICD-10-CM

## 2020-07-30 PROCEDURE — 99214 OFFICE O/P EST MOD 30 MIN: CPT | Performed by: INTERNAL MEDICINE

## 2020-07-30 RX ORDER — SIMETHICONE 125 MG
125 TABLET,CHEWABLE ORAL EVERY 6 HOURS PRN
COMMUNITY
End: 2021-07-19
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
## 2020-08-11 ENCOUNTER — OFFICE (AMBULATORY)
Dept: URBAN - METROPOLITAN AREA PATHOLOGY 4 | Facility: PATHOLOGY | Age: 80
End: 2020-08-11
Payer: COMMERCIAL

## 2020-08-11 ENCOUNTER — ON CAMPUS - OUTPATIENT (AMBULATORY)
Dept: URBAN - METROPOLITAN AREA HOSPITAL 2 | Facility: HOSPITAL | Age: 80
End: 2020-08-11

## 2020-08-11 ENCOUNTER — OFFICE (AMBULATORY)
Dept: URBAN - METROPOLITAN AREA PATHOLOGY 4 | Facility: PATHOLOGY | Age: 80
End: 2020-08-11

## 2020-08-11 VITALS
DIASTOLIC BLOOD PRESSURE: 61 MMHG | OXYGEN SATURATION: 100 % | DIASTOLIC BLOOD PRESSURE: 36 MMHG | HEART RATE: 65 BPM | SYSTOLIC BLOOD PRESSURE: 164 MMHG | SYSTOLIC BLOOD PRESSURE: 110 MMHG | DIASTOLIC BLOOD PRESSURE: 77 MMHG | SYSTOLIC BLOOD PRESSURE: 106 MMHG | DIASTOLIC BLOOD PRESSURE: 60 MMHG | HEART RATE: 55 BPM | DIASTOLIC BLOOD PRESSURE: 68 MMHG | DIASTOLIC BLOOD PRESSURE: 63 MMHG | HEART RATE: 57 BPM | SYSTOLIC BLOOD PRESSURE: 86 MMHG | HEART RATE: 59 BPM | RESPIRATION RATE: 18 BRPM | OXYGEN SATURATION: 99 % | OXYGEN SATURATION: 98 % | WEIGHT: 255 LBS | SYSTOLIC BLOOD PRESSURE: 129 MMHG | TEMPERATURE: 96.9 F | SYSTOLIC BLOOD PRESSURE: 97 MMHG | DIASTOLIC BLOOD PRESSURE: 103 MMHG | SYSTOLIC BLOOD PRESSURE: 147 MMHG | HEIGHT: 66 IN | DIASTOLIC BLOOD PRESSURE: 85 MMHG | OXYGEN SATURATION: 94 % | SYSTOLIC BLOOD PRESSURE: 113 MMHG | HEART RATE: 56 BPM | HEART RATE: 63 BPM | RESPIRATION RATE: 16 BRPM

## 2020-08-11 DIAGNOSIS — K31.7 POLYP OF STOMACH AND DUODENUM: ICD-10-CM

## 2020-08-11 DIAGNOSIS — D12.5 BENIGN NEOPLASM OF SIGMOID COLON: ICD-10-CM

## 2020-08-11 DIAGNOSIS — K64.0 FIRST DEGREE HEMORRHOIDS: ICD-10-CM

## 2020-08-11 DIAGNOSIS — D12.3 BENIGN NEOPLASM OF TRANSVERSE COLON: ICD-10-CM

## 2020-08-11 DIAGNOSIS — K63.5 POLYP OF COLON: ICD-10-CM

## 2020-08-11 DIAGNOSIS — Z85.038 PERSONAL HISTORY OF OTHER MALIGNANT NEOPLASM OF LARGE INTEST: ICD-10-CM

## 2020-08-11 DIAGNOSIS — R13.10 DYSPHAGIA, UNSPECIFIED: ICD-10-CM

## 2020-08-11 DIAGNOSIS — K44.9 DIAPHRAGMATIC HERNIA WITHOUT OBSTRUCTION OR GANGRENE: ICD-10-CM

## 2020-08-11 DIAGNOSIS — R10.84 GENERALIZED ABDOMINAL PAIN: ICD-10-CM

## 2020-08-11 DIAGNOSIS — K57.30 DIVERTICULOSIS OF LARGE INTESTINE WITHOUT PERFORATION OR ABS: ICD-10-CM

## 2020-08-11 LAB
GI HISTOLOGY: A. UNSPECIFIED: (no result)
GI HISTOLOGY: B. UNSPECIFIED: (no result)
GI HISTOLOGY: C. UNSPECIFIED: (no result)
GI HISTOLOGY: PDF REPORT: (no result)

## 2020-08-11 PROCEDURE — 45385 COLONOSCOPY W/LESION REMOVAL: CPT | Performed by: INTERNAL MEDICINE

## 2020-08-11 PROCEDURE — 45380 COLONOSCOPY AND BIOPSY: CPT | Mod: 59 | Performed by: INTERNAL MEDICINE

## 2020-08-11 PROCEDURE — 43450 DILATE ESOPHAGUS 1/MULT PASS: CPT | Performed by: INTERNAL MEDICINE

## 2020-08-11 PROCEDURE — 43239 EGD BIOPSY SINGLE/MULTIPLE: CPT | Performed by: INTERNAL MEDICINE

## 2020-08-11 PROCEDURE — 88305 TISSUE EXAM BY PATHOLOGIST: CPT | Mod: 26 | Performed by: INTERNAL MEDICINE

## 2020-08-11 RX ORDER — SUCRALFATE 1 G/1
TABLET ORAL
Refills: 0 | Status: COMPLETED
End: 2020-08-11

## 2020-09-17 ENCOUNTER — ON CAMPUS - OUTPATIENT (AMBULATORY)
Dept: URBAN - METROPOLITAN AREA HOSPITAL 2 | Facility: HOSPITAL | Age: 80
End: 2020-09-17

## 2020-09-17 VITALS
OXYGEN SATURATION: 98 % | RESPIRATION RATE: 18 BRPM | DIASTOLIC BLOOD PRESSURE: 100 MMHG | SYSTOLIC BLOOD PRESSURE: 137 MMHG | HEART RATE: 68 BPM | DIASTOLIC BLOOD PRESSURE: 85 MMHG | DIASTOLIC BLOOD PRESSURE: 84 MMHG | HEART RATE: 51 BPM | SYSTOLIC BLOOD PRESSURE: 163 MMHG | HEART RATE: 59 BPM | TEMPERATURE: 96.8 F | DIASTOLIC BLOOD PRESSURE: 63 MMHG | DIASTOLIC BLOOD PRESSURE: 94 MMHG | OXYGEN SATURATION: 100 % | WEIGHT: 278 LBS | HEART RATE: 57 BPM | HEART RATE: 53 BPM | SYSTOLIC BLOOD PRESSURE: 183 MMHG | HEART RATE: 73 BPM | HEART RATE: 65 BPM | SYSTOLIC BLOOD PRESSURE: 153 MMHG | SYSTOLIC BLOOD PRESSURE: 171 MMHG | DIASTOLIC BLOOD PRESSURE: 78 MMHG | DIASTOLIC BLOOD PRESSURE: 89 MMHG | RESPIRATION RATE: 16 BRPM | SYSTOLIC BLOOD PRESSURE: 146 MMHG | SYSTOLIC BLOOD PRESSURE: 138 MMHG | HEIGHT: 66 IN

## 2020-09-17 DIAGNOSIS — R13.10 DYSPHAGIA, UNSPECIFIED: ICD-10-CM

## 2020-09-17 DIAGNOSIS — R14.0 ABDOMINAL DISTENSION (GASEOUS): ICD-10-CM

## 2020-09-17 DIAGNOSIS — K31.7 POLYP OF STOMACH AND DUODENUM: ICD-10-CM

## 2020-09-17 DIAGNOSIS — K22.4 DYSKINESIA OF ESOPHAGUS: ICD-10-CM

## 2020-09-17 PROCEDURE — 43450 DILATE ESOPHAGUS 1/MULT PASS: CPT | Performed by: INTERNAL MEDICINE

## 2020-09-17 PROCEDURE — 43235 EGD DIAGNOSTIC BRUSH WASH: CPT | Performed by: INTERNAL MEDICINE

## 2020-09-17 RX ORDER — CEPHALEXIN 500 MG/1
1500 CAPSULE ORAL
Qty: 30 | Refills: 0 | Status: COMPLETED
Start: 2020-09-17 | End: 2020-12-17

## 2020-09-17 RX ORDER — METRONIDAZOLE 250 MG/1
750 TABLET, FILM COATED ORAL
Qty: 63 | Refills: 0 | Status: COMPLETED
Start: 2020-09-17 | End: 2020-12-17

## 2020-09-17 NOTE — SERVICEHPINOTES
WAYLON BOWENS  is a  80  female   who presents today for a  EGD   for   the indications listed below. The updated Patient Profile was reviewed prior to the procedure, in conjunction with the Physical Exam, including medical conditions, surgical procedures, medications, allergies, family history and social history. See Physical Exam time stamp below for date and time of HPI completion.Pre-operatively, I reviewed the indication(s) for the procedure, the risks of the procedure [including but not limited to: unexpected bleeding possibly requiring hospitalization and/or unplanned repeat procedures, perforation possibly requiring surgical treatment, missed lesions and complications of sedation/MAC (also explained by anesthesia staff)]. I have evaluated the patient for risks associated with the planned anesthesia and the procedure to be performed and find the patient an acceptable candidate for IV sedation.Multiple opportunities were provided for any questions or concerns, and all questions were answered satisfactorily before any anesthesia was administered. We will proceed with the planned procedure.BR

## 2020-10-16 ENCOUNTER — OFFICE (AMBULATORY)
Dept: URBAN - METROPOLITAN AREA CLINIC 64 | Facility: CLINIC | Age: 80
End: 2020-10-16

## 2020-10-16 VITALS — HEIGHT: 66 IN

## 2020-10-16 DIAGNOSIS — R14.0 ABDOMINAL DISTENSION (GASEOUS): ICD-10-CM

## 2020-10-16 DIAGNOSIS — R13.10 DYSPHAGIA, UNSPECIFIED: ICD-10-CM

## 2020-10-16 PROCEDURE — 99212 OFFICE O/P EST SF 10 MIN: CPT | Mod: 95 | Performed by: NURSE PRACTITIONER

## 2020-12-17 PROBLEM — K22.4 DYSKINESIA OF ESOPHAGUS: Status: ACTIVE | Noted: 2020-09-17

## 2020-12-18 ENCOUNTER — ON CAMPUS - OUTPATIENT (AMBULATORY)
Dept: URBAN - METROPOLITAN AREA HOSPITAL 2 | Facility: HOSPITAL | Age: 80
End: 2020-12-18

## 2020-12-18 VITALS
SYSTOLIC BLOOD PRESSURE: 162 MMHG | SYSTOLIC BLOOD PRESSURE: 174 MMHG | HEART RATE: 58 BPM | DIASTOLIC BLOOD PRESSURE: 82 MMHG | HEART RATE: 61 BPM | DIASTOLIC BLOOD PRESSURE: 94 MMHG | HEART RATE: 60 BPM | RESPIRATION RATE: 20 BRPM | DIASTOLIC BLOOD PRESSURE: 98 MMHG | RESPIRATION RATE: 16 BRPM | HEIGHT: 66 IN | DIASTOLIC BLOOD PRESSURE: 100 MMHG | HEART RATE: 78 BPM | WEIGHT: 269 LBS | SYSTOLIC BLOOD PRESSURE: 156 MMHG | RESPIRATION RATE: 18 BRPM | SYSTOLIC BLOOD PRESSURE: 157 MMHG | TEMPERATURE: 97.1 F | DIASTOLIC BLOOD PRESSURE: 102 MMHG | HEART RATE: 71 BPM | DIASTOLIC BLOOD PRESSURE: 77 MMHG | OXYGEN SATURATION: 99 % | SYSTOLIC BLOOD PRESSURE: 144 MMHG | OXYGEN SATURATION: 100 % | SYSTOLIC BLOOD PRESSURE: 180 MMHG | HEART RATE: 65 BPM

## 2020-12-18 DIAGNOSIS — K31.7 POLYP OF STOMACH AND DUODENUM: ICD-10-CM

## 2020-12-18 DIAGNOSIS — K22.4 DYSKINESIA OF ESOPHAGUS: ICD-10-CM

## 2020-12-18 DIAGNOSIS — K44.9 DIAPHRAGMATIC HERNIA WITHOUT OBSTRUCTION OR GANGRENE: ICD-10-CM

## 2020-12-18 DIAGNOSIS — R13.10 DYSPHAGIA, UNSPECIFIED: ICD-10-CM

## 2020-12-18 PROCEDURE — 43450 DILATE ESOPHAGUS 1/MULT PASS: CPT | Performed by: INTERNAL MEDICINE

## 2020-12-18 PROCEDURE — 43235 EGD DIAGNOSTIC BRUSH WASH: CPT | Performed by: INTERNAL MEDICINE

## 2020-12-23 ENCOUNTER — TRANSCRIBE ORDERS (OUTPATIENT)
Dept: ADMINISTRATIVE | Facility: HOSPITAL | Age: 80
End: 2020-12-23

## 2020-12-23 ENCOUNTER — OFFICE VISIT (OUTPATIENT)
Dept: CARDIOLOGY | Facility: CLINIC | Age: 80
End: 2020-12-23

## 2020-12-23 VITALS
BODY MASS INDEX: 42.59 KG/M2 | HEART RATE: 65 BPM | DIASTOLIC BLOOD PRESSURE: 83 MMHG | OXYGEN SATURATION: 99 % | SYSTOLIC BLOOD PRESSURE: 169 MMHG | TEMPERATURE: 96.4 F | HEIGHT: 66 IN | WEIGHT: 265 LBS

## 2020-12-23 DIAGNOSIS — I48.20 CHRONIC ATRIAL FIBRILLATION (HCC): Primary | ICD-10-CM

## 2020-12-23 DIAGNOSIS — R47.02 DYSPHASIA: Primary | ICD-10-CM

## 2020-12-23 DIAGNOSIS — E78.00 PURE HYPERCHOLESTEROLEMIA: ICD-10-CM

## 2020-12-23 DIAGNOSIS — I10 ESSENTIAL HYPERTENSION: ICD-10-CM

## 2020-12-23 DIAGNOSIS — N18.4 CHRONIC KIDNEY DISEASE (CKD), STAGE IV (SEVERE) (HCC): ICD-10-CM

## 2020-12-23 DIAGNOSIS — I73.9 PERIPHERAL VASCULAR DISEASE (HCC): ICD-10-CM

## 2020-12-23 DIAGNOSIS — G47.33 OBSTRUCTIVE SLEEP APNEA SYNDROME: ICD-10-CM

## 2020-12-23 DIAGNOSIS — I25.10 CORONARY ARTERY DISEASE INVOLVING NATIVE CORONARY ARTERY OF NATIVE HEART WITHOUT ANGINA PECTORIS: ICD-10-CM

## 2020-12-23 PROCEDURE — 99214 OFFICE O/P EST MOD 30 MIN: CPT | Performed by: INTERNAL MEDICINE

## 2020-12-23 NOTE — PROGRESS NOTES
Subjective:     Encounter Date:12/23/2020      Patient ID: Yasmeen Rahman is a 80 y.o. female.    Chief Complaint:  History of Present Illness seven 10-year-old white female with history of coronary disease atrial fibrillation chronic renal sufficiency hypertension hyperlipidemia sleep apnea peripheral vascular disease presents to my office for follow-up.  Patient is currently stable without any signs of chest pain or shortness of breath at rest on exertion.  No complains any PND orthopnea.  She has occasional palpitation without any dizziness syncope or swelling of the feet.  She has been taking her medicines regularly.  She does not smoke.  She is trying to exercise regular.  She follows a good diet.    The following portions of the patient's history were reviewed and updated as appropriate: allergies, current medications, past family history, past medical history, past social history, past surgical history and problem list.  Past Medical History:   Diagnosis Date   • Anemia 2018   • Anxiety 2018   • Arthritis    • Atrial fibrillation (CMS/HCC)    • CAD (coronary artery disease) 2013   • CKD (chronic kidney disease) 2019   • Colon cancer (CMS/HCC) 09/2019    right hemicolectomy   • GERD (gastroesophageal reflux disease) 2009   • Gout 2009   • High cholesterol 1989   • Hypertension 1989   • Hypothyroidism 2004   • Lupus (CMS/HCC)    • PHOENIX (obstructive sleep apnea)    • Osteopenia 2015   • Osteoporosis 2004   • PONV (postoperative nausea and vomiting)    • Rheumatoid arthritis (CMS/HCC) 2009     Past Surgical History:   Procedure Laterality Date   • BLADDER SURGERY  11/2019    tumor removed   • CARDIAC CATHETERIZATION      no stents   • CHOLECYSTECTOMY  1990    laparoscopic    • COLON RESECTION Right 9/5/2019    Procedure: Open right hemicolectomy;  Surgeon: Dominick Cunha DO;  Location: [facility];  Service: General   • COLONOSCOPY     • ENDOSCOPY  12/2020   • ESOPHAGEAL DILATATION     • HEEL SPUR  "EXCISION Right 1994   • HYSTERECTOMY  1994    total due to heavy beeding and fibroids   • JOINT REPLACEMENT     • TONSILLECTOMY  1962   • TOTAL HIP ARTHROPLASTY Right 2010     /83   Pulse 65   Temp 96.4 °F (35.8 °C)   Ht 167.6 cm (66\")   Wt 120 kg (265 lb)   LMP  (LMP Unknown)   SpO2 99%   BMI 42.77 kg/m²   Family History   Problem Relation Age of Onset   • Colon cancer Brother 71       Current Outpatient Medications:   •  acetaminophen (TYLENOL 8 HOUR ARTHRITIS PAIN) 650 MG 8 hr tablet, Take 650 mg by mouth Every 8 (Eight) Hours As Needed for Mild Pain ., Disp: , Rfl:   •  allopurinol (ZYLOPRIM) 100 MG tablet, Take  by mouth Daily., Disp: , Rfl:   •  aspirin 81 MG tablet, Take 81 mg by mouth every night at bedtime., Disp: , Rfl:   •  b complex vitamins tablet, Take 1 tablet by mouth Daily., Disp: , Rfl:   •  bumetanide (BUMEX) 2 MG tablet, Take 1 mg by mouth Daily., Disp: , Rfl:   •  Calcium Carbonate-Vitamin D (CALCIUM 600+D) 600-200 MG-UNIT tablet, Take 1,200 mg by mouth Daily., Disp: , Rfl:   •  Cholecalciferol (VITAMIN D3) 2000 units capsule, Take 2,000 Units by mouth every night at bedtime., Disp: , Rfl:   •  CloNIDine (CATAPRES) 0.1 MG tablet, TAKE 1 TABLET TWICE A DAY, Disp: , Rfl:   •  colestipol (COLESTID) 1 g tablet, Take 2 g by mouth 2 (Two) Times a Day., Disp: , Rfl: 1  •  escitalopram (LEXAPRO) 10 MG tablet, Take 10 mg by mouth Daily., Disp: , Rfl:   •  gabapentin (NEURONTIN) 100 MG capsule, Take 200 mg by mouth Every Morning., Disp: , Rfl:   •  gabapentin (NEURONTIN) 300 MG capsule, Take 300 mg by mouth Every Night., Disp: , Rfl:   •  hydroxychloroquine (PLAQUENIL) 200 MG tablet, Take 200 mg by mouth 2 (Two) Times a Day., Disp: , Rfl:   •  irbesartan (AVAPRO) 300 MG tablet, Take 300 mg by mouth Daily., Disp: , Rfl:   •  levothyroxine (SYNTHROID, LEVOTHROID) 100 MCG tablet, Take 100 mcg by mouth Daily., Disp: , Rfl:   •  metoprolol tartrate (LOPRESSOR) 100 MG tablet, TAKE 1 TABLET TWICE A " DAY, Disp: , Rfl:   •  pantoprazole (PROTONIX) 40 MG EC tablet, Take  by mouth Daily., Disp: , Rfl:   •  potassium chloride (K-DUR) 10 MEQ CR tablet, 10 mEq Daily., Disp: , Rfl:   •  pravastatin (PRAVACHOL) 40 MG tablet, Take 40 mg by mouth Every Night., Disp: , Rfl:   •  simethicone (MYLICON) 125 MG chewable tablet, Chew 125 mg Every 6 (Six) Hours As Needed for Flatulence., Disp: , Rfl:   •  vitamin B-12 (CYANOCOBALAMIN) 1000 MCG tablet, Take 1,000 mcg by mouth Daily., Disp: , Rfl:   •  vitamin C (ASCORBIC ACID) 500 MG tablet, Take 500 mg by mouth Daily., Disp: , Rfl:   •  XARELTO 20 MG tablet, TAKE 1 TABLET DAILY, Disp: 90 tablet, Rfl: 3  •  ferrous sulfate 325 (65 FE) MG tablet, Take 325 mg by mouth Daily With Breakfast., Disp: , Rfl:   Allergies   Allergen Reactions   • Codeine Nausea And Vomiting   • Hydrocodone-Acetaminophen Nausea And Vomiting     Nausea,vomiting     Social History     Socioeconomic History   • Marital status:      Spouse name: Not on file   • Number of children: 4   • Years of education: Not on file   • Highest education level: Not on file   Occupational History   • Occupation: Retired    Tobacco Use   • Smoking status: Never Smoker   • Smokeless tobacco: Never Used   Substance and Sexual Activity   • Alcohol use: No     Frequency: Never   • Drug use: No   • Sexual activity: Defer     Review of Systems   Constitution: Positive for malaise/fatigue. Negative for fever.   HENT: Negative for ear pain and nosebleeds.    Eyes: Negative for blurred vision and double vision.   Cardiovascular: Positive for palpitations. Negative for chest pain, dyspnea on exertion and leg swelling.   Respiratory: Negative for cough and shortness of breath.    Skin: Negative for rash.   Musculoskeletal: Negative for joint pain.   Gastrointestinal: Positive for nausea and vomiting. Negative for abdominal pain.   Neurological: Negative for focal weakness, headaches, light-headedness and numbness.    Psychiatric/Behavioral: Negative for depression. The patient is not nervous/anxious.    All other systems reviewed and are negative.             Objective:     Constitutional:       Appearance: Well-developed.   Eyes:      General: No scleral icterus.     Conjunctiva/sclera: Conjunctivae normal.      Pupils: Pupils are equal, round, and reactive to light.   HENT:      Head: Normocephalic and atraumatic.   Neck:      Musculoskeletal: Normal range of motion and neck supple.      Vascular: No carotid bruit or JVD.   Pulmonary:      Effort: Pulmonary effort is normal.      Breath sounds: Normal breath sounds. No wheezing. No rales.   Cardiovascular:      Normal rate. Regular rhythm.      Murmurs: There is a systolic murmur.   Pulses:     Intact distal pulses.   Abdominal:      General: Bowel sounds are normal.      Palpations: Abdomen is soft.   Musculoskeletal: Normal range of motion.   Skin:     General: Skin is warm and dry.      Findings: No rash.   Neurological:      Mental Status: Alert.      Comments: No focal deficits       Procedures    Lab Review:       Assessment:          Diagnosis Plan   1. Chronic atrial fibrillation (CMS/HCC)     2. Essential hypertension     3. Pure hypercholesterolemia     4. Peripheral vascular disease (CMS/HCC)     5. Coronary artery disease involving native coronary artery of native heart without angina pectoris     6. Chronic kidney disease (CKD), stage IV (severe) (CMS/HCC)     7. Obstructive sleep apnea syndrome            Plan:     Patient has history of coronary disease with nonobstructive disease and normal B function is currently stable on medical therapy  Patient has history of atrial fibrillation and is currently on medical therapy including Xarelto  Patient blood pressure slightly high  Registered medicines including increasing the dose of clonidine  Patient was on higher dose of beta-blockers but they were decreased because of low heart rates and her heart rates are  better now  Patient's lipid levels are followed by the primary care doctor  Patient has chronic renal sufficiency and is followed by nephrologist  Patient has sleep apnea and uses CPAP machine  Continue current medicines and follow her in 6 months

## 2020-12-28 DIAGNOSIS — R13.10 DYSPHAGIA, UNSPECIFIED TYPE: Primary | ICD-10-CM

## 2021-01-04 ENCOUNTER — LAB (OUTPATIENT)
Dept: LAB | Facility: HOSPITAL | Age: 81
End: 2021-01-04

## 2021-01-04 DIAGNOSIS — R13.10 DYSPHAGIA, UNSPECIFIED TYPE: ICD-10-CM

## 2021-01-04 PROCEDURE — C9803 HOPD COVID-19 SPEC COLLECT: HCPCS

## 2021-01-04 PROCEDURE — U0004 COV-19 TEST NON-CDC HGH THRU: HCPCS

## 2021-01-05 LAB — SARS-COV-2 ORF1AB RESP QL NAA+PROBE: NOT DETECTED

## 2021-01-06 ENCOUNTER — HOSPITAL ENCOUNTER (OUTPATIENT)
Dept: GENERAL RADIOLOGY | Facility: HOSPITAL | Age: 81
Discharge: HOME | End: 2021-01-06
Admitting: INTERNAL MEDICINE

## 2021-01-06 DIAGNOSIS — R47.02 DYSPHASIA: ICD-10-CM

## 2021-01-06 PROCEDURE — A9270 NON-COVERED ITEM OR SERVICE: HCPCS | Performed by: INTERNAL MEDICINE

## 2021-01-06 PROCEDURE — 63710000001 BARIUM SULFATE 96 % RECONSTITUTED SUSPENSION: Performed by: INTERNAL MEDICINE

## 2021-01-06 PROCEDURE — 74220 X-RAY XM ESOPHAGUS 1CNTRST: CPT

## 2021-01-06 PROCEDURE — 63710000001 BARIUM SULFATE 98 % RECONSTITUTED SUSPENSION: Performed by: INTERNAL MEDICINE

## 2021-01-06 RX ADMIN — BARIUM SULFATE 135 ML: 980 POWDER, FOR SUSPENSION ORAL at 10:45

## 2021-01-06 RX ADMIN — BARIUM SULFATE 183 ML: 960 POWDER, FOR SUSPENSION ORAL at 10:45

## 2021-02-24 ENCOUNTER — LAB (OUTPATIENT)
Dept: LAB | Facility: HOSPITAL | Age: 81
End: 2021-02-24

## 2021-02-24 ENCOUNTER — TRANSCRIBE ORDERS (OUTPATIENT)
Dept: ADMINISTRATIVE | Facility: HOSPITAL | Age: 81
End: 2021-02-24

## 2021-02-24 DIAGNOSIS — E03.9 HYPOTHYROIDISM, UNSPECIFIED TYPE: ICD-10-CM

## 2021-02-24 DIAGNOSIS — I10 ESSENTIAL HYPERTENSION, BENIGN: ICD-10-CM

## 2021-02-24 DIAGNOSIS — N18.2 CHRONIC KIDNEY DISEASE, STAGE II (MILD): ICD-10-CM

## 2021-02-24 DIAGNOSIS — I10 ESSENTIAL HYPERTENSION, MALIGNANT: ICD-10-CM

## 2021-02-24 DIAGNOSIS — N18.2 CHRONIC KIDNEY DISEASE, STAGE II (MILD): Primary | ICD-10-CM

## 2021-02-24 LAB
ANION GAP SERPL CALCULATED.3IONS-SCNC: 12.4 MMOL/L (ref 5–15)
BACTERIA UR QL AUTO: ABNORMAL /HPF
BASOPHILS # BLD AUTO: 0.03 10*3/MM3 (ref 0–0.2)
BASOPHILS NFR BLD AUTO: 0.4 % (ref 0–1.5)
BILIRUB UR QL STRIP: NEGATIVE
BUN SERPL-MCNC: 7 MG/DL (ref 8–23)
BUN/CREAT SERPL: 9.7 (ref 7–25)
CALCIUM SPEC-SCNC: 9.2 MG/DL (ref 8.6–10.5)
CHLORIDE SERPL-SCNC: 98 MMOL/L (ref 98–107)
CLARITY UR: CLEAR
CO2 SERPL-SCNC: 25.6 MMOL/L (ref 22–29)
COLOR UR: YELLOW
CREAT SERPL-MCNC: 0.72 MG/DL (ref 0.57–1)
CREAT UR-MCNC: 55.4 MG/DL
DEPRECATED RDW RBC AUTO: 44.2 FL (ref 37–54)
EOSINOPHIL # BLD AUTO: 0.13 10*3/MM3 (ref 0–0.4)
EOSINOPHIL NFR BLD AUTO: 1.8 % (ref 0.3–6.2)
ERYTHROCYTE [DISTWIDTH] IN BLOOD BY AUTOMATED COUNT: 13.1 % (ref 12.3–15.4)
GFR SERPL CREATININE-BSD FRML MDRD: 78 ML/MIN/1.73
GLUCOSE SERPL-MCNC: 100 MG/DL (ref 65–99)
GLUCOSE UR STRIP-MCNC: NEGATIVE MG/DL
HCT VFR BLD AUTO: 36.2 % (ref 34–46.6)
HGB BLD-MCNC: 12.2 G/DL (ref 12–15.9)
HGB UR QL STRIP.AUTO: ABNORMAL
HYALINE CASTS UR QL AUTO: ABNORMAL /LPF
IMM GRANULOCYTES # BLD AUTO: 0.06 10*3/MM3 (ref 0–0.05)
IMM GRANULOCYTES NFR BLD AUTO: 0.8 % (ref 0–0.5)
KETONES UR QL STRIP: NEGATIVE
LEUKOCYTE ESTERASE UR QL STRIP.AUTO: ABNORMAL
LYMPHOCYTES # BLD AUTO: 1.04 10*3/MM3 (ref 0.7–3.1)
LYMPHOCYTES NFR BLD AUTO: 14.4 % (ref 19.6–45.3)
MCH RBC QN AUTO: 31.5 PG (ref 26.6–33)
MCHC RBC AUTO-ENTMCNC: 33.7 G/DL (ref 31.5–35.7)
MCV RBC AUTO: 93.5 FL (ref 79–97)
MONOCYTES # BLD AUTO: 0.62 10*3/MM3 (ref 0.1–0.9)
MONOCYTES NFR BLD AUTO: 8.6 % (ref 5–12)
NEUTROPHILS NFR BLD AUTO: 5.34 10*3/MM3 (ref 1.7–7)
NEUTROPHILS NFR BLD AUTO: 74 % (ref 42.7–76)
NITRITE UR QL STRIP: NEGATIVE
NRBC BLD AUTO-RTO: 0 /100 WBC (ref 0–0.2)
PH UR STRIP.AUTO: 7.5 [PH] (ref 5–8)
PLATELET # BLD AUTO: 240 10*3/MM3 (ref 140–450)
PMV BLD AUTO: 9.9 FL (ref 6–12)
POTASSIUM SERPL-SCNC: 3.9 MMOL/L (ref 3.5–5.2)
PROT UR QL STRIP: ABNORMAL
PROT UR-MCNC: 17 MG/DL
RBC # BLD AUTO: 3.87 10*6/MM3 (ref 3.77–5.28)
RBC # UR: ABNORMAL /HPF
REF LAB TEST METHOD: ABNORMAL
SODIUM SERPL-SCNC: 136 MMOL/L (ref 136–145)
SP GR UR STRIP: 1.01 (ref 1–1.03)
SQUAMOUS #/AREA URNS HPF: ABNORMAL /HPF
TSH SERPL DL<=0.05 MIU/L-ACNC: 1.9 UIU/ML (ref 0.27–4.2)
UROBILINOGEN UR QL STRIP: ABNORMAL
WBC # BLD AUTO: 7.22 10*3/MM3 (ref 3.4–10.8)
WBC UR QL AUTO: ABNORMAL /HPF

## 2021-02-24 PROCEDURE — 84443 ASSAY THYROID STIM HORMONE: CPT

## 2021-02-24 PROCEDURE — 81001 URINALYSIS AUTO W/SCOPE: CPT

## 2021-02-24 PROCEDURE — 80048 BASIC METABOLIC PNL TOTAL CA: CPT

## 2021-02-24 PROCEDURE — 85025 COMPLETE CBC W/AUTO DIFF WBC: CPT

## 2021-02-24 PROCEDURE — 87086 URINE CULTURE/COLONY COUNT: CPT

## 2021-02-24 PROCEDURE — 82570 ASSAY OF URINE CREATININE: CPT

## 2021-02-24 PROCEDURE — 84156 ASSAY OF PROTEIN URINE: CPT

## 2021-02-24 PROCEDURE — 87186 SC STD MICRODIL/AGAR DIL: CPT

## 2021-02-24 PROCEDURE — 36415 COLL VENOUS BLD VENIPUNCTURE: CPT

## 2021-02-24 PROCEDURE — 87077 CULTURE AEROBIC IDENTIFY: CPT

## 2021-02-26 LAB — BACTERIA SPEC AEROBE CULT: ABNORMAL

## 2021-04-05 ENCOUNTER — OFFICE (AMBULATORY)
Dept: URBAN - METROPOLITAN AREA CLINIC 64 | Facility: CLINIC | Age: 81
End: 2021-04-05

## 2021-04-05 VITALS — HEIGHT: 66 IN

## 2021-04-05 DIAGNOSIS — R13.10 DYSPHAGIA, UNSPECIFIED: ICD-10-CM

## 2021-04-05 PROCEDURE — 99212 OFFICE O/P EST SF 10 MIN: CPT | Mod: 95 | Performed by: NURSE PRACTITIONER

## 2021-06-03 RX ORDER — RIVAROXABAN 20 MG/1
TABLET, FILM COATED ORAL
Qty: 90 TABLET | Refills: 1 | Status: SHIPPED | OUTPATIENT
Start: 2021-06-03 | End: 2021-12-02

## 2021-07-19 ENCOUNTER — OFFICE VISIT (OUTPATIENT)
Dept: CARDIOLOGY | Facility: CLINIC | Age: 81
End: 2021-07-19

## 2021-07-19 VITALS
DIASTOLIC BLOOD PRESSURE: 73 MMHG | SYSTOLIC BLOOD PRESSURE: 123 MMHG | BODY MASS INDEX: 42.43 KG/M2 | HEART RATE: 68 BPM | HEIGHT: 66 IN | OXYGEN SATURATION: 99 % | WEIGHT: 264 LBS

## 2021-07-19 DIAGNOSIS — E78.00 PURE HYPERCHOLESTEROLEMIA: ICD-10-CM

## 2021-07-19 DIAGNOSIS — I10 ESSENTIAL HYPERTENSION: ICD-10-CM

## 2021-07-19 DIAGNOSIS — N18.4 CHRONIC KIDNEY DISEASE (CKD), STAGE IV (SEVERE) (HCC): ICD-10-CM

## 2021-07-19 DIAGNOSIS — I25.10 CORONARY ARTERY DISEASE INVOLVING NATIVE CORONARY ARTERY OF NATIVE HEART WITHOUT ANGINA PECTORIS: ICD-10-CM

## 2021-07-19 DIAGNOSIS — I73.9 PERIPHERAL VASCULAR DISEASE (HCC): ICD-10-CM

## 2021-07-19 DIAGNOSIS — I48.20 CHRONIC ATRIAL FIBRILLATION (HCC): Primary | ICD-10-CM

## 2021-07-19 DIAGNOSIS — G47.33 OBSTRUCTIVE SLEEP APNEA SYNDROME: ICD-10-CM

## 2021-07-19 PROCEDURE — 99214 OFFICE O/P EST MOD 30 MIN: CPT | Performed by: INTERNAL MEDICINE

## 2021-07-19 RX ORDER — DULOXETIN HYDROCHLORIDE 60 MG/1
60 CAPSULE, DELAYED RELEASE ORAL DAILY
COMMUNITY
Start: 2022-03-01

## 2021-07-19 RX ORDER — ATENOLOL 25 MG/1
25 TABLET ORAL DAILY
COMMUNITY

## 2021-07-19 NOTE — PROGRESS NOTES
Subjective:     Encounter Date:07/19/2021      Patient ID: Yasmeen Rahman is a 81 y.o. female.    Chief Complaint:  History of Present Illness 81-year-old white female with history of chronic atrial fibrillation hypertension hyperlipidemia peripheral disease coronary disease chronic renal insufficiency and obstructive sleep apnea presents to my office for follow-up.  Patient is currently stable without any signs of chest pain but has some shortness of breath with exertion.  No complains any PND orthopnea.  She has occasional palpitations.  No dizziness syncope.  She has some swelling of the feet.  She is taking her medicines regularly.  She cannot walk because of her knee problems.  She uses CPAP machine regularly.    The following portions of the patient's history were reviewed and updated as appropriate: allergies, current medications, past family history, past medical history, past social history, past surgical history and problem list.  Past Medical History:   Diagnosis Date   • Anemia 2018   • Anxiety 2018   • Arthritis    • Atrial fibrillation (CMS/HCC)    • CAD (coronary artery disease) 2013   • CKD (chronic kidney disease) 2019   • Colon cancer (CMS/HCC) 09/2019    right hemicolectomy   • GERD (gastroesophageal reflux disease) 2009   • Gout 2009   • High cholesterol 1989   • Hypertension 1989   • Hypothyroidism 2004   • Lupus (CMS/HCC)    • PHOENIX (obstructive sleep apnea)    • Osteopenia 2015   • Osteoporosis 2004   • PONV (postoperative nausea and vomiting)    • Rheumatoid arthritis (CMS/HCC) 2009     Past Surgical History:   Procedure Laterality Date   • BLADDER SURGERY  11/2019    tumor removed   • CARDIAC CATHETERIZATION      no stents   • CHOLECYSTECTOMY  1990    laparoscopic    • COLON RESECTION Right 9/5/2019    Procedure: Open right hemicolectomy;  Surgeon: Dominick Cunha DO;  Location: [facility];  Service: General   • COLONOSCOPY     • ENDOSCOPY  12/2020   • ESOPHAGEAL DILATATION    "  • HEEL SPUR EXCISION Right 1994   • HYSTERECTOMY  1994    total due to heavy beeding and fibroids   • JOINT REPLACEMENT     • TONSILLECTOMY  1962   • TOTAL HIP ARTHROPLASTY Right 2010     /73 (BP Location: Right arm, Patient Position: Sitting)   Pulse 68   Ht 167.6 cm (66\")   Wt 120 kg (264 lb)   LMP  (LMP Unknown)   SpO2 99%   BMI 42.61 kg/m²   Family History   Problem Relation Age of Onset   • Colon cancer Brother 71       Current Outpatient Medications:   •  allopurinol (ZYLOPRIM) 100 MG tablet, Take  by mouth Daily., Disp: , Rfl:   •  aspirin 81 MG tablet, Take 81 mg by mouth every night at bedtime., Disp: , Rfl:   •  atenolol (TENORMIN) 25 MG tablet, Take 25 mg by mouth Daily., Disp: , Rfl:   •  bumetanide (BUMEX) 1 MG tablet, Take 1 mg by mouth Daily., Disp: , Rfl:   •  Calcium Carbonate-Vitamin D (CALCIUM 600+D) 600-200 MG-UNIT tablet, Take 1,200 mg by mouth Daily., Disp: , Rfl:   •  Cholecalciferol (VITAMIN D3) 2000 units capsule, Take 2,000 Units by mouth every night at bedtime., Disp: , Rfl:   •  CloNIDine (CATAPRES) 0.1 MG tablet, TAKE 1 TABLET TWICE A DAY, Disp: , Rfl:   •  colestipol (COLESTID) 1 g tablet, Take 2 g by mouth 2 (Two) Times a Day., Disp: , Rfl: 1  •  DULoxetine (CYMBALTA) 30 MG capsule, Take 30 mg by mouth Daily., Disp: , Rfl:   •  hydroxychloroquine (PLAQUENIL) 200 MG tablet, Take 200 mg by mouth 2 (Two) Times a Day., Disp: , Rfl:   •  irbesartan (AVAPRO) 300 MG tablet, Take 300 mg by mouth Daily., Disp: , Rfl:   •  levothyroxine (SYNTHROID, LEVOTHROID) 100 MCG tablet, Take 100 mcg by mouth Daily., Disp: , Rfl:   •  pantoprazole (PROTONIX) 40 MG EC tablet, Take  by mouth Daily., Disp: , Rfl:   •  potassium chloride (K-DUR) 10 MEQ CR tablet, 10 mEq Daily., Disp: , Rfl:   •  pravastatin (PRAVACHOL) 40 MG tablet, Take 40 mg by mouth Every Night., Disp: , Rfl:   •  Probiotic Product (PROBIOTIC ADVANCED PO), Take  by mouth., Disp: , Rfl:   •  vitamin B-12 (CYANOCOBALAMIN) 1000 " MCG tablet, Take 1,000 mcg by mouth Daily., Disp: , Rfl:   •  vitamin C (ASCORBIC ACID) 500 MG tablet, Take 500 mg by mouth Daily., Disp: , Rfl:   •  Xarelto 20 MG tablet, TAKE 1 TABLET DAILY, Disp: 90 tablet, Rfl: 1  Allergies   Allergen Reactions   • Codeine Nausea And Vomiting   • Hydrocodone-Acetaminophen Nausea And Vomiting     Nausea,vomiting     Social History     Socioeconomic History   • Marital status:      Spouse name: Not on file   • Number of children: 4   • Years of education: Not on file   • Highest education level: Not on file   Tobacco Use   • Smoking status: Never Smoker   • Smokeless tobacco: Never Used   Substance and Sexual Activity   • Alcohol use: No   • Drug use: No   • Sexual activity: Defer     Review of Systems   Constitutional: Negative for fever and malaise/fatigue.   Cardiovascular: Positive for leg swelling and palpitations. Negative for chest pain and dyspnea on exertion.   Respiratory: Positive for shortness of breath. Negative for cough.    Skin: Negative for rash.   Gastrointestinal: Negative for abdominal pain, nausea and vomiting.   Neurological: Negative for focal weakness and headaches.   All other systems reviewed and are negative.             Objective:     Constitutional:       Appearance: Well-developed.   Eyes:      General: No scleral icterus.     Conjunctiva/sclera: Conjunctivae normal.   HENT:      Head: Normocephalic and atraumatic.   Neck:      Vascular: No carotid bruit or JVD.   Pulmonary:      Effort: Pulmonary effort is normal.      Breath sounds: Normal breath sounds. No wheezing. No rales.   Cardiovascular:      Normal rate. Regular rhythm.   Pulses:     Intact distal pulses.   Abdominal:      General: Bowel sounds are normal.      Palpations: Abdomen is soft.   Musculoskeletal:      Cervical back: Normal range of motion and neck supple. Skin:     General: Skin is warm and dry.      Findings: No rash.   Neurological:      Mental Status: Alert.        Procedures    Lab Review:         MDM  1.  Coronary disease  Patient has nonobstructive disease with normal be systolic function is currently stable on medications  2.  Obstructive sleep apnea  Sleep apnea uses a CPAP machine  3.  Atrial fibrillation  Patient has permanent atrial fibrillation and is currently on Xarelto for anticoagulation  4.  Hypertension  Patient blood pressure currently stable on irbesartan  5.  Hyperlipidemia  Patient lipids are followed by primary doctor she is on a statin  6.  Chronic renal insufficiency  Patient's kidney functions followed by the nephrologist      Patient's previous medical records, labs, and EKG were reviewed and discussed with the patient at today's visit.

## 2021-08-18 ENCOUNTER — LAB REQUISITION (OUTPATIENT)
Dept: LAB | Facility: HOSPITAL | Age: 81
End: 2021-08-18

## 2021-08-18 DIAGNOSIS — Z00.00 ENCOUNTER FOR GENERAL ADULT MEDICAL EXAMINATION WITHOUT ABNORMAL FINDINGS: ICD-10-CM

## 2021-08-18 LAB
ALBUMIN SERPL-MCNC: 4.1 G/DL (ref 3.5–5.2)
ALBUMIN/GLOB SERPL: 1.8 G/DL
ALP SERPL-CCNC: 132 U/L (ref 39–117)
ALT SERPL W P-5'-P-CCNC: 7 U/L (ref 1–33)
ANION GAP SERPL CALCULATED.3IONS-SCNC: 13 MMOL/L (ref 5–15)
AST SERPL-CCNC: 12 U/L (ref 1–32)
BILIRUB SERPL-MCNC: 0.9 MG/DL (ref 0–1.2)
BUN SERPL-MCNC: 11 MG/DL (ref 8–23)
BUN/CREAT SERPL: 13.8 (ref 7–25)
CALCIUM SPEC-SCNC: 8.8 MG/DL (ref 8.6–10.5)
CHLORIDE SERPL-SCNC: 95 MMOL/L (ref 98–107)
CO2 SERPL-SCNC: 27 MMOL/L (ref 22–29)
CREAT SERPL-MCNC: 0.8 MG/DL (ref 0.57–1)
GFR SERPL CREATININE-BSD FRML MDRD: 69 ML/MIN/1.73
GLOBULIN UR ELPH-MCNC: 2.3 GM/DL
GLUCOSE SERPL-MCNC: 93 MG/DL (ref 65–99)
POTASSIUM SERPL-SCNC: 3.9 MMOL/L (ref 3.5–5.2)
PROT SERPL-MCNC: 6.4 G/DL (ref 6–8.5)
SODIUM SERPL-SCNC: 135 MMOL/L (ref 136–145)

## 2021-08-18 PROCEDURE — 80053 COMPREHEN METABOLIC PANEL: CPT | Performed by: NURSE PRACTITIONER

## 2021-09-01 ENCOUNTER — LAB (OUTPATIENT)
Dept: LAB | Facility: HOSPITAL | Age: 81
End: 2021-09-01

## 2021-09-01 ENCOUNTER — TRANSCRIBE ORDERS (OUTPATIENT)
Dept: ADMINISTRATIVE | Facility: HOSPITAL | Age: 81
End: 2021-09-01

## 2021-09-01 DIAGNOSIS — R31.9 URINARY TRACT INFECTION WITH HEMATURIA, SITE UNSPECIFIED: Primary | ICD-10-CM

## 2021-09-01 DIAGNOSIS — R31.9 URINARY TRACT INFECTION WITH HEMATURIA, SITE UNSPECIFIED: ICD-10-CM

## 2021-09-01 DIAGNOSIS — N39.0 URINARY TRACT INFECTION WITH HEMATURIA, SITE UNSPECIFIED: Primary | ICD-10-CM

## 2021-09-01 DIAGNOSIS — N39.0 URINARY TRACT INFECTION WITH HEMATURIA, SITE UNSPECIFIED: ICD-10-CM

## 2021-09-01 PROCEDURE — 87186 SC STD MICRODIL/AGAR DIL: CPT

## 2021-09-01 PROCEDURE — 87086 URINE CULTURE/COLONY COUNT: CPT

## 2021-09-01 PROCEDURE — 87088 URINE BACTERIA CULTURE: CPT

## 2021-09-01 PROCEDURE — 81001 URINALYSIS AUTO W/SCOPE: CPT

## 2021-09-02 LAB
BACTERIA UR QL AUTO: ABNORMAL /HPF
BILIRUB UR QL STRIP: NEGATIVE
CLARITY UR: ABNORMAL
COLOR UR: ABNORMAL
GLUCOSE UR STRIP-MCNC: NEGATIVE MG/DL
HGB UR QL STRIP.AUTO: ABNORMAL
HYALINE CASTS UR QL AUTO: ABNORMAL /LPF
KETONES UR QL STRIP: NEGATIVE
LEUKOCYTE ESTERASE UR QL STRIP.AUTO: ABNORMAL
NITRITE UR QL STRIP: NEGATIVE
PH UR STRIP.AUTO: 7 [PH] (ref 5–8)
PROT UR QL STRIP: ABNORMAL
RBC # UR: ABNORMAL /HPF
REF LAB TEST METHOD: ABNORMAL
SP GR UR STRIP: 1.01 (ref 1–1.03)
SQUAMOUS #/AREA URNS HPF: ABNORMAL /HPF
UROBILINOGEN UR QL STRIP: ABNORMAL
WBC UR QL AUTO: ABNORMAL /HPF

## 2021-09-03 LAB — BACTERIA SPEC AEROBE CULT: ABNORMAL

## 2021-09-04 LAB — BACTERIA SPEC AEROBE CULT: ABNORMAL

## 2021-12-02 RX ORDER — RIVAROXABAN 20 MG/1
TABLET, FILM COATED ORAL
Qty: 90 TABLET | Refills: 3 | Status: SHIPPED | OUTPATIENT
Start: 2021-12-02 | End: 2022-11-28

## 2021-12-02 NOTE — TELEPHONE ENCOUNTER
Rx Refill Note  Requested Prescriptions     Pending Prescriptions Disp Refills   • Xarelto 20 MG tablet [Pharmacy Med Name: XARELTO TABS 20MG] 90 tablet 3     Sig: TAKE 1 TABLET DAILY      Last office visit with prescribing clinician: 7/19/2021      Next office visit with prescribing clinician: 2/7/2022            Emil Khalil MA  12/02/21, 07:55 EST

## 2021-12-10 ENCOUNTER — TELEPHONE (OUTPATIENT)
Dept: CARDIOLOGY | Facility: CLINIC | Age: 81
End: 2021-12-10

## 2022-01-03 ENCOUNTER — TELEPHONE (OUTPATIENT)
Dept: CARDIOLOGY | Facility: CLINIC | Age: 82
End: 2022-01-03

## 2022-01-04 NOTE — TELEPHONE ENCOUNTER
PATIENT IS CLEARED FOR PROCEDURE AND CAN STOP XARELTO FOR 3 DAYS, LETTER HAS BEEN SIGNED AND FAXED BACK TO 'S OFFICE.

## 2022-02-07 ENCOUNTER — OFFICE VISIT (OUTPATIENT)
Dept: CARDIOLOGY | Facility: CLINIC | Age: 82
End: 2022-02-07

## 2022-02-07 VITALS
OXYGEN SATURATION: 100 % | SYSTOLIC BLOOD PRESSURE: 135 MMHG | HEIGHT: 66 IN | WEIGHT: 258 LBS | DIASTOLIC BLOOD PRESSURE: 84 MMHG | HEART RATE: 79 BPM | BODY MASS INDEX: 41.46 KG/M2

## 2022-02-07 DIAGNOSIS — G47.33 OBSTRUCTIVE SLEEP APNEA SYNDROME: ICD-10-CM

## 2022-02-07 DIAGNOSIS — E78.00 PURE HYPERCHOLESTEROLEMIA: ICD-10-CM

## 2022-02-07 DIAGNOSIS — I48.20 CHRONIC ATRIAL FIBRILLATION: Primary | ICD-10-CM

## 2022-02-07 DIAGNOSIS — I10 ESSENTIAL HYPERTENSION: ICD-10-CM

## 2022-02-07 PROCEDURE — 99214 OFFICE O/P EST MOD 30 MIN: CPT | Performed by: INTERNAL MEDICINE

## 2022-02-07 PROCEDURE — 93000 ELECTROCARDIOGRAM COMPLETE: CPT | Performed by: INTERNAL MEDICINE

## 2022-02-07 RX ORDER — PHENOL 1.4 %
10 AEROSOL, SPRAY (ML) MUCOUS MEMBRANE DAILY
COMMUNITY
Start: 2022-01-01

## 2022-02-07 RX ORDER — CETIRIZINE HYDROCHLORIDE 10 MG/1
10 TABLET ORAL DAILY
COMMUNITY

## 2022-02-07 NOTE — PROGRESS NOTES
Subjective:     Encounter Date:02/07/2022      Patient ID: Yasmeen Rahman is a 81 y.o. female.    Chief Complaint:  History of Present Illness 81-year-old white female with history of chronic atrial fibrillation hypertension hyperlipidemia and obstructive sleep apnea presents to my office for follow-up.  Patient is currently stable without any symptoms of chest pain but has some shortness of with exertion.  No complains any PND orthopnea.  No palpitation dizziness syncope or swelling of the feet.  Patient has been taking all meds regular.  Patient does not smoke.  She is unable to walk very well because of her back problems.    The following portions of the patient's history were reviewed and updated as appropriate: allergies, current medications, past family history, past medical history, past social history, past surgical history and problem list.  Past Medical History:   Diagnosis Date   • Anemia 2018   • Anxiety 2018   • Arthritis    • Atrial fibrillation (HCC)    • CAD (coronary artery disease) 2013   • CKD (chronic kidney disease) 2019   • Colon cancer (HCC) 09/2019    right hemicolectomy   • GERD (gastroesophageal reflux disease) 2009   • Gout 2009   • High cholesterol 1989   • Hypertension 1989   • Hypothyroidism 2004   • Lupus (HCC)    • PHOENIX (obstructive sleep apnea)    • Osteopenia 2015   • Osteoporosis 2004   • PONV (postoperative nausea and vomiting)    • Rheumatoid arthritis (HCC) 2009     Past Surgical History:   Procedure Laterality Date   • BLADDER SURGERY  11/2019    tumor removed   • CARDIAC CATHETERIZATION      no stents   • CHOLECYSTECTOMY  1990    laparoscopic    • COLON RESECTION Right 9/5/2019    Procedure: Open right hemicolectomy;  Surgeon: Dominick Cunha DO;  Location: [facility];  Service: General   • COLONOSCOPY     • ENDOSCOPY  12/2020   • ESOPHAGEAL DILATATION     • HEEL SPUR EXCISION Right 1994   • HYSTERECTOMY  1994    total due to heavy beeding and fibroids   • JOINT  "REPLACEMENT     • TONSILLECTOMY  1962   • TOTAL HIP ARTHROPLASTY Right 2010     /84 (BP Location: Left arm, Patient Position: Sitting)   Pulse 79   Ht 167.6 cm (66\")   Wt 117 kg (258 lb)   LMP  (LMP Unknown)   SpO2 100%   BMI 41.64 kg/m²   Family History   Problem Relation Age of Onset   • Colon cancer Brother 71       Current Outpatient Medications:   •  allopurinol (ZYLOPRIM) 100 MG tablet, Take  by mouth Daily., Disp: , Rfl:   •  atenolol (TENORMIN) 25 MG tablet, Take 25 mg by mouth Daily., Disp: , Rfl:   •  bumetanide (BUMEX) 1 MG tablet, Take 1 mg by mouth Daily., Disp: , Rfl:   •  Calcium Carbonate-Vitamin D (CALCIUM 600+D) 600-200 MG-UNIT tablet, Take 1,200 mg by mouth Daily., Disp: , Rfl:   •  cetirizine (zyrTEC) 10 MG tablet, Take 10 mg by mouth Daily., Disp: , Rfl:   •  Cholecalciferol (VITAMIN D3) 2000 units capsule, Take 2,000 Units by mouth every night at bedtime., Disp: , Rfl:   •  CloNIDine (CATAPRES) 0.1 MG tablet, TAKE 1 TABLET TWICE A DAY, Disp: , Rfl:   •  colestipol (COLESTID) 1 g tablet, Take 2 g by mouth 2 (Two) Times a Day., Disp: , Rfl: 1  •  DULoxetine (CYMBALTA) 60 MG capsule, Take 30 mg by mouth Daily., Disp: , Rfl:   •  hydroxychloroquine (PLAQUENIL) 200 MG tablet, Take 200 mg by mouth 2 (Two) Times a Day., Disp: , Rfl:   •  irbesartan (AVAPRO) 300 MG tablet, Take 300 mg by mouth Daily., Disp: , Rfl:   •  levothyroxine (SYNTHROID, LEVOTHROID) 100 MCG tablet, Take 100 mcg by mouth Daily., Disp: , Rfl:   •  Melatonin 10 MG tablet, Take  by mouth., Disp: , Rfl:   •  pantoprazole (PROTONIX) 40 MG EC tablet, Take  by mouth Daily., Disp: , Rfl:   •  potassium chloride (K-DUR) 10 MEQ CR tablet, 10 mEq Daily., Disp: , Rfl:   •  pravastatin (PRAVACHOL) 40 MG tablet, Take 40 mg by mouth Every Night., Disp: , Rfl:   •  Probiotic Product (PROBIOTIC ADVANCED PO), Take  by mouth., Disp: , Rfl:   •  vitamin B-12 (CYANOCOBALAMIN) 1000 MCG tablet, Take 1,000 mcg by mouth Daily., Disp: , Rfl: "   •  vitamin C (ASCORBIC ACID) 500 MG tablet, Take 500 mg by mouth Daily., Disp: , Rfl:   •  Xarelto 20 MG tablet, TAKE 1 TABLET DAILY, Disp: 90 tablet, Rfl: 3  Allergies   Allergen Reactions   • Codeine Nausea And Vomiting   • Hydrocodone-Acetaminophen Nausea And Vomiting     Nausea,vomiting     Social History     Socioeconomic History   • Marital status:    • Number of children: 4   Tobacco Use   • Smoking status: Never Smoker   • Smokeless tobacco: Never Used   Substance and Sexual Activity   • Alcohol use: No   • Drug use: No   • Sexual activity: Defer     Review of Systems   Constitutional: Negative for fever and malaise/fatigue.   HENT: Negative for ear pain and nosebleeds.    Eyes: Negative for blurred vision and double vision.   Cardiovascular: Negative for chest pain, dyspnea on exertion and palpitations.   Respiratory: Positive for shortness of breath. Negative for cough.    Skin: Negative for rash.   Musculoskeletal: Negative for joint pain.   Gastrointestinal: Negative for abdominal pain, nausea and vomiting.   Neurological: Negative for focal weakness and headaches.   Psychiatric/Behavioral: Negative for depression. The patient is not nervous/anxious.    All other systems reviewed and are negative.             Objective:     Constitutional:       Appearance: Well-developed.   Eyes:      General: No scleral icterus.     Conjunctiva/sclera: Conjunctivae normal.   HENT:      Head: Normocephalic and atraumatic.   Neck:      Vascular: No carotid bruit or JVD.   Pulmonary:      Effort: Pulmonary effort is normal.      Breath sounds: Normal breath sounds. No wheezing. No rales.   Cardiovascular:      Normal rate. Irregularly irregular rhythm.      Murmurs: There is a systolic murmur.   Pulses:     Intact distal pulses.   Abdominal:      General: Bowel sounds are normal.      Palpations: Abdomen is soft.   Musculoskeletal:      Cervical back: Normal range of motion and neck supple. Skin:     General:  Skin is warm and dry.      Findings: No rash.   Neurological:      Mental Status: Alert.         ECG 12 Lead    Date/Time: 2/7/2022 1:28 PM  Performed by: Trever Mtz MD  Authorized by: Trever Mtz MD   Comments: Atrial fibrillation  Nonspecific interventricular conduction delay  Poor R wave progression in the leads consistent with old anterior MI  Abnormal EKG  No new changes from previous EKG            Lab Review:         MDM  1.  Chronic atrial fibrillation  Patient has history of chronic atrial fibrillation is currently stable on medications including Xarelto and atenolol for rate control  2.  Hypertension  Patient blood pressure currently stable on irbesartan and atenolol  3.  Hyperlipidemia  Patient is currently on statins and the lipid levels are followed by the primary care doctor  4.  Sleep apnea  Patient has history of sleep apnea and uses a CPAP machine      Patient's previous medical records, labs, and EKG were reviewed and discussed with the patient at today's visit.

## 2022-04-29 ENCOUNTER — OFFICE (AMBULATORY)
Dept: URBAN - METROPOLITAN AREA CLINIC 64 | Facility: CLINIC | Age: 82
End: 2022-04-29

## 2022-04-29 ENCOUNTER — TRANSCRIBE ORDERS (OUTPATIENT)
Dept: ADMINISTRATIVE | Facility: HOSPITAL | Age: 82
End: 2022-04-29

## 2022-04-29 VITALS
SYSTOLIC BLOOD PRESSURE: 132 MMHG | HEIGHT: 66 IN | WEIGHT: 258 LBS | HEART RATE: 94 BPM | DIASTOLIC BLOOD PRESSURE: 78 MMHG

## 2022-04-29 DIAGNOSIS — I73.9 PVD (PERIPHERAL VASCULAR DISEASE): Primary | ICD-10-CM

## 2022-04-29 DIAGNOSIS — R13.10 DYSPHAGIA, UNSPECIFIED: ICD-10-CM

## 2022-04-29 DIAGNOSIS — K21.9 GASTRO-ESOPHAGEAL REFLUX DISEASE WITHOUT ESOPHAGITIS: ICD-10-CM

## 2022-04-29 PROCEDURE — 99214 OFFICE O/P EST MOD 30 MIN: CPT | Performed by: INTERNAL MEDICINE

## 2022-05-02 ENCOUNTER — TELEPHONE (OUTPATIENT)
Dept: CARDIOLOGY | Facility: CLINIC | Age: 82
End: 2022-05-02

## 2022-05-02 NOTE — TELEPHONE ENCOUNTER
DR. CARITO BRICEÑO   EGD  SURGERY 5/19/22  [phone] PHONE  [phone] FAX    PLACED ON DR. DIMA MACHADO DESK.

## 2022-05-06 ENCOUNTER — HOSPITAL ENCOUNTER (OUTPATIENT)
Dept: CARDIOLOGY | Facility: HOSPITAL | Age: 82
Discharge: HOME | End: 2022-05-06
Admitting: FAMILY MEDICINE

## 2022-05-06 DIAGNOSIS — I73.9 PVD (PERIPHERAL VASCULAR DISEASE): ICD-10-CM

## 2022-05-06 LAB
BH CV LOWER ARTERIAL LEFT ABI RATIO: 1.37
BH CV LOWER ARTERIAL LEFT DORSALIS PEDIS SYS MAX: 103
BH CV LOWER ARTERIAL LEFT GREAT TOE SYS MAX: 43
BH CV LOWER ARTERIAL LEFT POST TIBIAL SYS MAX: 151
BH CV LOWER ARTERIAL LEFT TBI RATIO: 0.39
BH CV LOWER ARTERIAL RIGHT ABI RATIO: 1.35
BH CV LOWER ARTERIAL RIGHT DORSALIS PEDIS SYS MAX: 101
BH CV LOWER ARTERIAL RIGHT GREAT TOE SYS MAX: 87
BH CV LOWER ARTERIAL RIGHT POST TIBIAL SYS MAX: 148
BH CV LOWER ARTERIAL RIGHT TBI RATIO: 0.79
MAXIMAL PREDICTED HEART RATE: 140 BPM
STRESS TARGET HR: 119 BPM
UPPER ARTERIAL LEFT ARM BRACHIAL SYS MAX: 110
UPPER ARTERIAL RIGHT ARM BRACHIAL SYS MAX: 106

## 2022-05-06 PROCEDURE — 93922 UPR/L XTREMITY ART 2 LEVELS: CPT

## 2022-05-16 ENCOUNTER — TRANSCRIBE ORDERS (OUTPATIENT)
Dept: HOME HEALTH SERVICES | Facility: HOME HEALTHCARE | Age: 82
End: 2022-05-16

## 2022-05-16 ENCOUNTER — HOME HEALTH ADMISSION (OUTPATIENT)
Dept: HOME HEALTH SERVICES | Facility: HOME HEALTHCARE | Age: 82
End: 2022-05-16

## 2022-05-16 DIAGNOSIS — L97.521 NON-PRESSURE CHRONIC ULCER OF OTHER PART OF LEFT FOOT LIMITED TO BREAKDOWN OF SKIN: Primary | ICD-10-CM

## 2022-05-17 ENCOUNTER — HOME CARE VISIT (OUTPATIENT)
Dept: HOME HEALTH SERVICES | Facility: HOME HEALTHCARE | Age: 82
End: 2022-05-17

## 2022-05-17 PROCEDURE — G0299 HHS/HOSPICE OF RN EA 15 MIN: HCPCS

## 2022-05-18 NOTE — HOME HEALTH
Patient is a pleasant 81 year old female who is being admitted to home health services today. Patient denies recent hospitalization, but states that her rheumatologist sent us in to treat patient's right foot. Patient and daughter state that the bottom of her right foot and the top of her toes are very painful, red and peel. Daughter uses various creams and foot will clear up for a short time, but then starts peeling again. Patient states that she can barely walk because of the pain. Uric acid level was checked 3 months ago when this started and was WNL. SN will call MD to obtain new lab orders. Daughter and patient are concerned about foot, state that the doctors do not know what it is, or how to treat it. Patient has an upcoming appointment with a new podiatrist to get a second opinion. SN also recommended a dermatolgy appointment. Patient lives with daughter who is her primary caregiver. Grandson also lives in the home and assists when needed. Patient uses rollator in her home. Denies the need for PT and OT at this time. No question about meds at this time. Plan to teach safety/falls prevention, medication education/management, labs as ordered, pain management education, RA management.    Primary focus of care: L foot wound

## 2022-05-19 ENCOUNTER — ON CAMPUS - OUTPATIENT (AMBULATORY)
Dept: URBAN - METROPOLITAN AREA HOSPITAL 2 | Facility: HOSPITAL | Age: 82
End: 2022-05-19

## 2022-05-19 VITALS
SYSTOLIC BLOOD PRESSURE: 176 MMHG | HEIGHT: 66 IN | OXYGEN SATURATION: 96 % | SYSTOLIC BLOOD PRESSURE: 124 MMHG | SYSTOLIC BLOOD PRESSURE: 182 MMHG | TEMPERATURE: 97 F | DIASTOLIC BLOOD PRESSURE: 89 MMHG | SYSTOLIC BLOOD PRESSURE: 128 MMHG | DIASTOLIC BLOOD PRESSURE: 74 MMHG | HEART RATE: 82 BPM | SYSTOLIC BLOOD PRESSURE: 126 MMHG | DIASTOLIC BLOOD PRESSURE: 86 MMHG | OXYGEN SATURATION: 99 % | RESPIRATION RATE: 17 BRPM | HEART RATE: 88 BPM | DIASTOLIC BLOOD PRESSURE: 92 MMHG | DIASTOLIC BLOOD PRESSURE: 90 MMHG | WEIGHT: 250 LBS | HEART RATE: 84 BPM | OXYGEN SATURATION: 95 % | HEART RATE: 100 BPM | DIASTOLIC BLOOD PRESSURE: 78 MMHG | HEART RATE: 72 BPM | SYSTOLIC BLOOD PRESSURE: 172 MMHG | HEART RATE: 85 BPM | RESPIRATION RATE: 18 BRPM

## 2022-05-19 DIAGNOSIS — K22.2 ESOPHAGEAL OBSTRUCTION: ICD-10-CM

## 2022-05-19 DIAGNOSIS — K31.7 POLYP OF STOMACH AND DUODENUM: ICD-10-CM

## 2022-05-19 DIAGNOSIS — K44.9 DIAPHRAGMATIC HERNIA WITHOUT OBSTRUCTION OR GANGRENE: ICD-10-CM

## 2022-05-19 DIAGNOSIS — R13.10 DYSPHAGIA, UNSPECIFIED: ICD-10-CM

## 2022-05-19 PROCEDURE — 43235 EGD DIAGNOSTIC BRUSH WASH: CPT | Performed by: INTERNAL MEDICINE

## 2022-05-19 PROCEDURE — 43450 DILATE ESOPHAGUS 1/MULT PASS: CPT | Performed by: INTERNAL MEDICINE

## 2022-05-20 ENCOUNTER — HOME CARE VISIT (OUTPATIENT)
Dept: HOME HEALTH SERVICES | Facility: HOME HEALTHCARE | Age: 82
End: 2022-05-20

## 2022-05-20 PROCEDURE — G0299 HHS/HOSPICE OF RN EA 15 MIN: HCPCS

## 2022-05-23 VITALS
SYSTOLIC BLOOD PRESSURE: 128 MMHG | OXYGEN SATURATION: 97 % | HEART RATE: 69 BPM | TEMPERATURE: 98 F | RESPIRATION RATE: 17 BRPM | DIASTOLIC BLOOD PRESSURE: 76 MMHG

## 2022-05-24 NOTE — HOME HEALTH
SN attempting to obtain [facility]. Awaiting a return call or fax

## 2022-05-26 ENCOUNTER — LAB REQUISITION (OUTPATIENT)
Dept: LAB | Facility: HOSPITAL | Age: 82
End: 2022-05-26

## 2022-05-26 ENCOUNTER — HOME CARE VISIT (OUTPATIENT)
Dept: HOME HEALTH SERVICES | Facility: HOME HEALTHCARE | Age: 82
End: 2022-05-26

## 2022-05-26 DIAGNOSIS — S91.302D UNSPECIFIED OPEN WOUND, LEFT FOOT, SUBSEQUENT ENCOUNTER: ICD-10-CM

## 2022-05-26 LAB
ALBUMIN SERPL-MCNC: 3.4 G/DL (ref 3.5–5.2)
ALBUMIN/GLOB SERPL: 1.3 G/DL
ALP SERPL-CCNC: 83 U/L (ref 39–117)
ALT SERPL W P-5'-P-CCNC: 17 U/L (ref 1–33)
ANION GAP SERPL CALCULATED.3IONS-SCNC: 13 MMOL/L (ref 5–15)
AST SERPL-CCNC: 26 U/L (ref 1–32)
BASOPHILS # BLD AUTO: 0 10*3/MM3 (ref 0–0.2)
BASOPHILS NFR BLD AUTO: 0.4 % (ref 0–1.5)
BILIRUB SERPL-MCNC: 0.5 MG/DL (ref 0–1.2)
BUN SERPL-MCNC: 23 MG/DL (ref 8–23)
BUN/CREAT SERPL: 31.1 (ref 7–25)
CALCIUM SPEC-SCNC: 8.6 MG/DL (ref 8.6–10.5)
CHLORIDE SERPL-SCNC: 96 MMOL/L (ref 98–107)
CO2 SERPL-SCNC: 24 MMOL/L (ref 22–29)
CREAT SERPL-MCNC: 0.74 MG/DL (ref 0.57–1)
CRP SERPL-MCNC: 0.49 MG/DL (ref 0–0.5)
DEPRECATED RDW RBC AUTO: 47.7 FL (ref 37–54)
EGFRCR SERPLBLD CKD-EPI 2021: 81.9 ML/MIN/1.73
EOSINOPHIL # BLD AUTO: 0.1 10*3/MM3 (ref 0–0.4)
EOSINOPHIL NFR BLD AUTO: 1.1 % (ref 0.3–6.2)
ERYTHROCYTE [DISTWIDTH] IN BLOOD BY AUTOMATED COUNT: 14.6 % (ref 12.3–15.4)
ERYTHROCYTE [SEDIMENTATION RATE] IN BLOOD: 63 MM/HR (ref 0–30)
GLOBULIN UR ELPH-MCNC: 2.6 GM/DL
GLUCOSE SERPL-MCNC: 106 MG/DL (ref 65–99)
HCT VFR BLD AUTO: 35.5 % (ref 34–46.6)
HGB BLD-MCNC: 11.4 G/DL (ref 12–15.9)
LYMPHOCYTES # BLD AUTO: 0.8 10*3/MM3 (ref 0.7–3.1)
LYMPHOCYTES NFR BLD AUTO: 10.7 % (ref 19.6–45.3)
MCH RBC QN AUTO: 30.7 PG (ref 26.6–33)
MCHC RBC AUTO-ENTMCNC: 32.2 G/DL (ref 31.5–35.7)
MCV RBC AUTO: 95.5 FL (ref 79–97)
MONOCYTES # BLD AUTO: 0.6 10*3/MM3 (ref 0.1–0.9)
MONOCYTES NFR BLD AUTO: 8 % (ref 5–12)
NEUTROPHILS NFR BLD AUTO: 5.9 10*3/MM3 (ref 1.7–7)
NEUTROPHILS NFR BLD AUTO: 79.8 % (ref 42.7–76)
NRBC BLD AUTO-RTO: 0 /100 WBC (ref 0–0.2)
PLATELET # BLD AUTO: 271 10*3/MM3 (ref 140–450)
PMV BLD AUTO: 8 FL (ref 6–12)
POTASSIUM SERPL-SCNC: 3.5 MMOL/L (ref 3.5–5.2)
PROT SERPL-MCNC: 6 G/DL (ref 6–8.5)
RBC # BLD AUTO: 3.72 10*6/MM3 (ref 3.77–5.28)
SODIUM SERPL-SCNC: 133 MMOL/L (ref 136–145)
URATE SERPL-MCNC: 6.6 MG/DL (ref 2.4–5.7)
WBC NRBC COR # BLD: 7.4 10*3/MM3 (ref 3.4–10.8)

## 2022-05-26 PROCEDURE — 86140 C-REACTIVE PROTEIN: CPT | Performed by: INTERNAL MEDICINE

## 2022-05-26 PROCEDURE — G0299 HHS/HOSPICE OF RN EA 15 MIN: HCPCS

## 2022-05-26 PROCEDURE — 85025 COMPLETE CBC W/AUTO DIFF WBC: CPT | Performed by: INTERNAL MEDICINE

## 2022-05-26 PROCEDURE — 85652 RBC SED RATE AUTOMATED: CPT | Performed by: INTERNAL MEDICINE

## 2022-05-26 PROCEDURE — 80053 COMPREHEN METABOLIC PANEL: CPT | Performed by: INTERNAL MEDICINE

## 2022-05-26 PROCEDURE — 84550 ASSAY OF BLOOD/URIC ACID: CPT | Performed by: INTERNAL MEDICINE

## 2022-05-27 ENCOUNTER — HOME CARE VISIT (OUTPATIENT)
Dept: HOME HEALTH SERVICES | Facility: HOME HEALTHCARE | Age: 82
End: 2022-05-27

## 2022-05-27 VITALS
RESPIRATION RATE: 17 BRPM | SYSTOLIC BLOOD PRESSURE: 138 MMHG | HEART RATE: 84 BPM | OXYGEN SATURATION: 100 % | DIASTOLIC BLOOD PRESSURE: 78 MMHG | TEMPERATURE: 97.4 F

## 2022-05-27 PROCEDURE — G0299 HHS/HOSPICE OF RN EA 15 MIN: HCPCS

## 2022-05-27 NOTE — CASE COMMUNICATION
Patient's Bipap machine was apart of the recall in June of last year and she has not gotten a replacement. She has a wound to left foot with decreased circulation and we feel Bipap will also help heal this wound. Daughter plans to call for your assistance in this matter

## 2022-05-27 NOTE — HOME HEALTH
Patients daughter reports they went to see Dr Mcgraw for her wound to be evaluated yesterday and surgical dressing was applied that is to stay in place for 3 days. New medications of medrol started today and Lotrison cream will start on Monday after surgical dressing is removed. Patient reports she completed ANU on 5/6/22 showing 2 pluse pulses to R Tibial and Dorsalis Pedis and 2 plus to left posterior tibial ad 1 plus to left dorsal Pedis.     Plan for next visit  follow up on potential for new wound care orders from Dr Mcgraw  Assess for BM  assess medications and teaching  Follow up on need for new Bipap / inbox message sent to Dr Yan on 5/27

## 2022-06-02 ENCOUNTER — APPOINTMENT (OUTPATIENT)
Dept: VASCULAR SURGERY | Facility: HOSPITAL | Age: 82
End: 2022-06-02

## 2022-06-02 PROCEDURE — G0463 HOSPITAL OUTPT CLINIC VISIT: HCPCS

## 2022-06-03 ENCOUNTER — HOME CARE VISIT (OUTPATIENT)
Dept: HOME HEALTH SERVICES | Facility: HOME HEALTHCARE | Age: 82
End: 2022-06-03

## 2022-06-03 VITALS
DIASTOLIC BLOOD PRESSURE: 68 MMHG | OXYGEN SATURATION: 97 % | HEART RATE: 62 BPM | TEMPERATURE: 97.8 F | RESPIRATION RATE: 17 BRPM | SYSTOLIC BLOOD PRESSURE: 128 MMHG

## 2022-06-03 PROCEDURE — G0299 HHS/HOSPICE OF RN EA 15 MIN: HCPCS

## 2022-06-03 NOTE — HOME HEALTH
Pt denies any new issues. Pt reports overall feeling well and good. Pt denies pain. No open areas noted on L foot at this time. Minimal reddness, pt has follow up appt with Dr. Mcgraw later today. Pt is knowledgeable and agreeable with upcoming planned DC as long as nothing changes within the next week. NOMNC signed at this time. No med changes reported.       CP assess  assess L foot  follow up on MD appts  assess pain   Discharge

## 2022-06-06 ENCOUNTER — HOME CARE VISIT (OUTPATIENT)
Dept: HOME HEALTH SERVICES | Facility: HOME HEALTHCARE | Age: 82
End: 2022-06-06

## 2022-06-06 VITALS
HEART RATE: 67 BPM | RESPIRATION RATE: 18 BRPM | DIASTOLIC BLOOD PRESSURE: 77 MMHG | TEMPERATURE: 89.9 F | SYSTOLIC BLOOD PRESSURE: 130 MMHG

## 2022-06-06 PROCEDURE — G0153 HHCP-SVS OF S/L PATH,EA 15MN: HCPCS

## 2022-06-10 ENCOUNTER — HOME CARE VISIT (OUTPATIENT)
Dept: HOME HEALTH SERVICES | Facility: HOME HEALTHCARE | Age: 82
End: 2022-06-10

## 2022-06-10 PROCEDURE — G0299 HHS/HOSPICE OF RN EA 15 MIN: HCPCS

## 2022-06-11 VITALS
TEMPERATURE: 97.5 F | OXYGEN SATURATION: 96 % | RESPIRATION RATE: 17 BRPM | DIASTOLIC BLOOD PRESSURE: 75 MMHG | HEART RATE: 76 BPM | SYSTOLIC BLOOD PRESSURE: 115 MMHG

## 2022-07-05 ENCOUNTER — TRANSCRIBE ORDERS (OUTPATIENT)
Dept: ADMINISTRATIVE | Facility: HOSPITAL | Age: 82
End: 2022-07-05

## 2022-07-05 ENCOUNTER — TRANSCRIBE ORDERS (OUTPATIENT)
Dept: WOUND CARE | Facility: HOSPITAL | Age: 82
End: 2022-07-05

## 2022-07-05 DIAGNOSIS — I65.23 BILATERAL CAROTID ARTERY OCCLUSION: Primary | ICD-10-CM

## 2022-09-08 ENCOUNTER — TRANSCRIBE ORDERS (OUTPATIENT)
Dept: ADMINISTRATIVE | Facility: HOSPITAL | Age: 82
End: 2022-09-08

## 2022-09-08 ENCOUNTER — HOSPITAL ENCOUNTER (OUTPATIENT)
Dept: CARDIOLOGY | Facility: HOSPITAL | Age: 82
Discharge: HOME | End: 2022-09-08

## 2022-09-08 ENCOUNTER — APPOINTMENT (OUTPATIENT)
Dept: VASCULAR SURGERY | Facility: HOSPITAL | Age: 82
End: 2022-09-08

## 2022-09-08 DIAGNOSIS — I73.9 PERIPHERAL VASCULAR DISEASE, UNSPECIFIED: Primary | ICD-10-CM

## 2022-09-08 DIAGNOSIS — I65.23 BILATERAL CAROTID ARTERY OCCLUSION: ICD-10-CM

## 2022-09-08 LAB
BH CV XLRA MEAS LEFT DIST CCA EDV: -16.7 CM/SEC
BH CV XLRA MEAS LEFT DIST CCA PSV: -67.8 CM/SEC
BH CV XLRA MEAS LEFT DIST ICA EDV: -15.6 CM/SEC
BH CV XLRA MEAS LEFT DIST ICA PSV: -67.2 CM/SEC
BH CV XLRA MEAS LEFT ICA/CCA RATIO: 0.99
BH CV XLRA MEAS LEFT PROX CCA EDV: 11.8 CM/SEC
BH CV XLRA MEAS LEFT PROX CCA PSV: 64.4 CM/SEC
BH CV XLRA MEAS LEFT PROX ECA PSV: -60.4 CM/SEC
BH CV XLRA MEAS LEFT PROX ICA EDV: -22 CM/SEC
BH CV XLRA MEAS LEFT PROX ICA PSV: -53.9 CM/SEC
BH CV XLRA MEAS LEFT PROX SCLA PSV: 127 CM/SEC
BH CV XLRA MEAS LEFT VERTEBRAL A EDV: 14.3 CM/SEC
BH CV XLRA MEAS LEFT VERTEBRAL A PSV: 43.9 CM/SEC
BH CV XLRA MEAS RIGHT DIST CCA EDV: -12.3 CM/SEC
BH CV XLRA MEAS RIGHT DIST CCA PSV: -52.5 CM/SEC
BH CV XLRA MEAS RIGHT DIST ICA EDV: -32.9 CM/SEC
BH CV XLRA MEAS RIGHT DIST ICA PSV: -113 CM/SEC
BH CV XLRA MEAS RIGHT ICA/CCA RATIO: 2.15
BH CV XLRA MEAS RIGHT PROX CCA EDV: 7.2 CM/SEC
BH CV XLRA MEAS RIGHT PROX CCA PSV: 49.2 CM/SEC
BH CV XLRA MEAS RIGHT PROX ECA PSV: -71.3 CM/SEC
BH CV XLRA MEAS RIGHT PROX ICA EDV: -30.4 CM/SEC
BH CV XLRA MEAS RIGHT PROX ICA PSV: -85.7 CM/SEC
BH CV XLRA MEAS RIGHT PROX SCLA PSV: 132 CM/SEC
BH CV XLRA MEAS RIGHT VERTEBRAL A EDV: 18.2 CM/SEC
BH CV XLRA MEAS RIGHT VERTEBRAL A PSV: 74.3 CM/SEC
LEFT ARM BP: NORMAL MMHG
MAXIMAL PREDICTED HEART RATE: 139 BPM
RIGHT ARM BP: NORMAL MMHG
STRESS TARGET HR: 118 BPM

## 2022-09-08 PROCEDURE — 93880 EXTRACRANIAL BILAT STUDY: CPT

## 2022-09-08 PROCEDURE — G0463 HOSPITAL OUTPT CLINIC VISIT: HCPCS

## 2022-09-14 ENCOUNTER — OFFICE VISIT (OUTPATIENT)
Dept: CARDIOLOGY | Facility: CLINIC | Age: 82
End: 2022-09-14

## 2022-09-14 VITALS
OXYGEN SATURATION: 98 % | SYSTOLIC BLOOD PRESSURE: 140 MMHG | HEIGHT: 66 IN | BODY MASS INDEX: 38.57 KG/M2 | DIASTOLIC BLOOD PRESSURE: 55 MMHG | WEIGHT: 240 LBS | HEART RATE: 75 BPM

## 2022-09-14 DIAGNOSIS — I48.20 CHRONIC ATRIAL FIBRILLATION: ICD-10-CM

## 2022-09-14 DIAGNOSIS — I73.9 PERIPHERAL VASCULAR DISEASE: ICD-10-CM

## 2022-09-14 DIAGNOSIS — I25.10 CORONARY ARTERY DISEASE INVOLVING NATIVE CORONARY ARTERY OF NATIVE HEART WITHOUT ANGINA PECTORIS: ICD-10-CM

## 2022-09-14 DIAGNOSIS — I65.23 BILATERAL CAROTID ARTERY OCCLUSION: Primary | ICD-10-CM

## 2022-09-14 DIAGNOSIS — G47.33 OBSTRUCTIVE SLEEP APNEA SYNDROME: ICD-10-CM

## 2022-09-14 DIAGNOSIS — I10 ESSENTIAL HYPERTENSION: ICD-10-CM

## 2022-09-14 DIAGNOSIS — E78.00 PURE HYPERCHOLESTEROLEMIA: ICD-10-CM

## 2022-09-14 PROCEDURE — 99214 OFFICE O/P EST MOD 30 MIN: CPT | Performed by: INTERNAL MEDICINE

## 2022-09-14 NOTE — PROGRESS NOTES
Subjective:     Encounter Date:09/14/2022      Patient ID: Yasmeen Rahman is a 82 y.o. female.    Chief Complaint:  History of Present Illness 82-year-old white female with history of coronary disease hypertension hyperlipidemia sleep apnea atrial fibrillation peripheral vascular disease with coronary disease presents to my office for follow-up.  Patient is currently stable without any signs of chest pain but has some shortness of breath with exertion.  No complains any PND orthopnea.  No palpitation but occasional dizziness.  No syncope or swelling of the feet.  Patient has been taking all medicines regularly.  Patient does not smoke.  Patient is trying to walk but she has balance problems and hence she uses a walker    The following portions of the patient's history were reviewed and updated as appropriate: allergies, current medications, past family history, past medical history, past social history, past surgical history and problem list.  Past Medical History:   Diagnosis Date   • Anemia 2018   • Anxiety 2018   • Arthritis    • Atrial fibrillation (HCC)    • CAD (coronary artery disease) 2013   • CKD (chronic kidney disease) 2019   • Colon cancer (HCC) 09/2019    right hemicolectomy   • GERD (gastroesophageal reflux disease) 2009   • Gout 2009   • High cholesterol 1989   • Hypertension 1989   • Hypothyroidism 2004   • Lupus (HCC)    • PHOENIX (obstructive sleep apnea)    • Osteopenia 2015   • Osteoporosis 2004   • PONV (postoperative nausea and vomiting)    • Rheumatoid arthritis (HCC) 2009     Past Surgical History:   Procedure Laterality Date   • ABLATION OF DYSRHYTHMIC FOCUS  9/1/22    Left knee   • BLADDER SURGERY  11/2019    tumor removed   • CARDIAC CATHETERIZATION      no stents   • CHOLECYSTECTOMY  1990    laparoscopic    • COLON RESECTION Right 09/05/2019    Procedure: Open right hemicolectomy;  Surgeon: Dominick Cunha DO;  Location: [facility] OR;  Service: General   • COLONOSCOPY     •  "ENDOSCOPY  12/2020   • ESOPHAGEAL DILATATION     • HEEL SPUR EXCISION Right 1994   • HYSTERECTOMY  1994    total due to heavy beeding and fibroids   • JOINT REPLACEMENT     • TONSILLECTOMY  1962   • TOTAL HIP ARTHROPLASTY Right 2010   • VEIN SURGERY  2014    Tied off veins to avoid blood clots in legs with Dr. Fregoso     /55 (BP Location: Left arm, Patient Position: Sitting, Cuff Size: Adult)   Pulse 75   Ht 167.6 cm (66\")   Wt 109 kg (240 lb)   LMP  (LMP Unknown)   SpO2 98%   BMI 38.74 kg/m²   Family History   Problem Relation Age of Onset   • Colon cancer Brother 71   • Heart attack Brother    • Hypertension Brother        Current Outpatient Medications:   •  allopurinol (ZYLOPRIM) 100 MG tablet, Take 100 mg by mouth Daily., Disp: , Rfl:   •  atenolol (TENORMIN) 25 MG tablet, Take 25 mg by mouth Daily., Disp: , Rfl:   •  bumetanide (BUMEX) 1 MG tablet, Take 2 mg by mouth Daily., Disp: , Rfl:   •  Calcium Carbonate-Vitamin D 600-200 MG-UNIT tablet, Take 1,200 mg by mouth Daily., Disp: , Rfl:   •  cetirizine (zyrTEC) 10 MG tablet, Take 10 mg by mouth Daily., Disp: , Rfl:   •  Cholecalciferol (VITAMIN D3) 2000 units capsule, Take 2,000 Units by mouth every night at bedtime., Disp: , Rfl:   •  CloNIDine (CATAPRES) 0.1 MG tablet, 1 tablet TID, Disp: , Rfl:   •  clotrimazole-betamethasone (LOTRISONE) 1-0.05 % cream, Apply 1 application topically to the appropriate area as directed 2 (Two) Times a Day. To begin after surgical dressing is removed on 5/30/22, Disp: , Rfl:   •  colchicine 0.6 MG tablet, Take 0.6 mg by mouth 2 (Two) Times a Day As Needed for Muscle / Joint Pain (Gouty flareups)., Disp: , Rfl:   •  colestipol (COLESTID) 1 g tablet, Take 2 g by mouth 2 (Two) Times a Day., Disp: , Rfl: 1  •  DULoxetine (CYMBALTA) 60 MG capsule, Take 60 mg by mouth Daily., Disp: , Rfl:   •  irbesartan (AVAPRO) 300 MG tablet, Take 300 mg by mouth Daily., Disp: , Rfl:   •  irbesartan (AVAPRO) 300 MG tablet, Take 300 mg " by mouth Daily., Disp: , Rfl:   •  levothyroxine (SYNTHROID, LEVOTHROID) 100 MCG tablet, Take 100 mcg by mouth Daily., Disp: , Rfl:   •  Melatonin 10 MG tablet, Take 10 mg by mouth Daily., Disp: , Rfl:   •  pantoprazole (PROTONIX) 40 MG EC tablet, Take 40 mg by mouth Daily., Disp: , Rfl:   •  potassium chloride (K-DUR) 10 MEQ CR tablet, Take 10 mEq by mouth Daily., Disp: , Rfl:   •  pravastatin (PRAVACHOL) 40 MG tablet, Take 40 mg by mouth Every Night., Disp: , Rfl:   •  predniSONE (DELTASONE) 10 MG tablet, Take 5 mg by mouth Daily As Needed., Disp: , Rfl:   •  Probiotic Product (PROBIOTIC ADVANCED PO), Take 1 tablet by mouth Daily., Disp: , Rfl:   •  traZODone (DESYREL) 50 MG tablet, Take 25 mg by mouth Daily As Needed. insomnia, Disp: , Rfl:   •  vitamin B-12 (CYANOCOBALAMIN) 1000 MCG tablet, Take 1,000 mcg by mouth Daily., Disp: , Rfl:   •  vitamin C (ASCORBIC ACID) 500 MG tablet, Take 500 mg by mouth Daily., Disp: , Rfl:   •  Xarelto 20 MG tablet, TAKE 1 TABLET DAILY, Disp: 90 tablet, Rfl: 3  Allergies   Allergen Reactions   • Codeine Nausea And Vomiting   • Hydrocodone-Acetaminophen Nausea And Vomiting     Nausea,vomiting     Social History     Socioeconomic History   • Marital status:    • Number of children: 4   Tobacco Use   • Smoking status: Never Smoker   • Smokeless tobacco: Never Used   Substance and Sexual Activity   • Alcohol use: No   • Drug use: No   • Sexual activity: Defer     Review of Systems   Constitutional: Positive for malaise/fatigue. Negative for fever.   Cardiovascular: Negative for chest pain, dyspnea on exertion and palpitations.   Respiratory: Positive for shortness of breath. Negative for cough.    Skin: Negative for rash.   Gastrointestinal: Negative for abdominal pain, nausea and vomiting.   Neurological: Positive for dizziness and light-headedness. Negative for focal weakness and headaches.   All other systems reviewed and are negative.             Objective:      Constitutional:       Appearance: Well-developed.   Eyes:      General: No scleral icterus.     Conjunctiva/sclera: Conjunctivae normal.   HENT:      Head: Normocephalic and atraumatic.   Neck:      Vascular: No carotid bruit or JVD.   Pulmonary:      Effort: Pulmonary effort is normal.      Breath sounds: Normal breath sounds. No wheezing. No rales.   Cardiovascular:      Normal rate. Regular rhythm.   Pulses:     Intact distal pulses.   Abdominal:      General: Bowel sounds are normal.      Palpations: Abdomen is soft.   Musculoskeletal:      Cervical back: Normal range of motion and neck supple. Skin:     General: Skin is warm and dry.      Findings: No rash.   Neurological:      Mental Status: Alert.       Procedures    Lab Review:         MDM  1.  Coronary disease  Patient has nonobstructive disease and is currently stable on medical therapy with normal V function  2.  Atrial fibrillation  Patient has chronic atrial fibrillation with a heart rates are well controlled with beta-blockers and she is also on Xarelto  3.  Hypertension  Patient blood pressure currently stable on multiple medications  4.  Hyperlipidemia  Patient is on pravastatin and the lipid levels are well within normal limits  5.  Peripheral vascular disease  Patient has coronary disease and is followed by vascular surgeon  6.  Sleep apnea  Patient is sleep apnea and uses a CPAP machine.      Patient's previous medical records, labs, and EKG were reviewed and discussed with the patient at today's visit.

## 2022-11-28 RX ORDER — RIVAROXABAN 20 MG/1
TABLET, FILM COATED ORAL
Qty: 90 TABLET | Refills: 3 | Status: SHIPPED | OUTPATIENT
Start: 2022-11-28

## 2022-11-28 NOTE — TELEPHONE ENCOUNTER
Rx Refill Note  Requested Prescriptions     Pending Prescriptions Disp Refills   • Xarelto 20 MG tablet [Pharmacy Med Name: XARELTO TABS 20MG] 90 tablet 3     Sig: TAKE 1 TABLET DAILY      Last office visit with prescribing clinician: 9/14/2022      Next office visit with prescribing clinician: 3/13/2023            Emil Khalil MA  11/28/22, 08:12 EST

## 2022-12-12 ENCOUNTER — OFFICE VISIT (OUTPATIENT)
Dept: WOUND CARE | Facility: HOSPITAL | Age: 82
End: 2022-12-12

## 2022-12-12 PROCEDURE — G0463 HOSPITAL OUTPT CLINIC VISIT: HCPCS

## 2023-04-19 ENCOUNTER — OFFICE VISIT (OUTPATIENT)
Dept: CARDIOLOGY | Facility: CLINIC | Age: 83
End: 2023-04-19
Payer: MEDICARE

## 2023-04-19 VITALS
WEIGHT: 251.5 LBS | BODY MASS INDEX: 40.42 KG/M2 | SYSTOLIC BLOOD PRESSURE: 133 MMHG | DIASTOLIC BLOOD PRESSURE: 82 MMHG | HEART RATE: 69 BPM | OXYGEN SATURATION: 98 % | HEIGHT: 66 IN

## 2023-04-19 DIAGNOSIS — I25.10 CORONARY ARTERY DISEASE INVOLVING NATIVE CORONARY ARTERY OF NATIVE HEART WITHOUT ANGINA PECTORIS: ICD-10-CM

## 2023-04-19 DIAGNOSIS — E78.00 PURE HYPERCHOLESTEROLEMIA: ICD-10-CM

## 2023-04-19 DIAGNOSIS — I10 ESSENTIAL HYPERTENSION: ICD-10-CM

## 2023-04-19 DIAGNOSIS — I73.9 PERIPHERAL VASCULAR DISEASE: ICD-10-CM

## 2023-04-19 DIAGNOSIS — G47.33 OBSTRUCTIVE SLEEP APNEA SYNDROME: ICD-10-CM

## 2023-04-19 DIAGNOSIS — I48.20 CHRONIC ATRIAL FIBRILLATION: Primary | ICD-10-CM

## 2023-04-19 PROCEDURE — 99214 OFFICE O/P EST MOD 30 MIN: CPT | Performed by: INTERNAL MEDICINE

## 2023-04-19 PROCEDURE — 3079F DIAST BP 80-89 MM HG: CPT | Performed by: INTERNAL MEDICINE

## 2023-04-19 PROCEDURE — 3075F SYST BP GE 130 - 139MM HG: CPT | Performed by: INTERNAL MEDICINE

## 2023-04-19 PROCEDURE — 1159F MED LIST DOCD IN RCRD: CPT | Performed by: INTERNAL MEDICINE

## 2023-04-19 PROCEDURE — 1160F RVW MEDS BY RX/DR IN RCRD: CPT | Performed by: INTERNAL MEDICINE

## 2023-04-19 NOTE — PROGRESS NOTES
Subjective:     Encounter Date:04/19/2023      Patient ID: Yasmeen Rahman is a 82 y.o. female.    Chief Complaint:  History of Present Illness 82-year-old white female with history of coronary disease history of atrial fibrillation sleep apnea hypertension hyperlipidemia.  Valvular disease presents to my office for follow-up.  Patient is currently stable without any symptoms of chest pain but has shortness of breath with exertion.  No complains of any PND orthopnea.  No palpitation dizziness syncope or swelling of the feet.  Patient is taking all her medicines regularly.  Patient does not smoke.    The following portions of the patient's history were reviewed and updated as appropriate: allergies, current medications, past family history, past medical history, past social history, past surgical history and problem list.  Past Medical History:   Diagnosis Date   • Anemia 2018   • Anxiety 2018   • Arthritis    • Atrial fibrillation    • CAD (coronary artery disease) 2013   • CKD (chronic kidney disease) 2019   • Colon cancer 09/2019    right hemicolectomy   • GERD (gastroesophageal reflux disease) 2009   • Gout 2009   • High cholesterol 1989   • Hypertension 1989   • Hypothyroidism 2004   • Lupus    • PHOENIX (obstructive sleep apnea)    • Osteopenia 2015   • Osteoporosis 2004   • PONV (postoperative nausea and vomiting)    • Rheumatoid arthritis 2009     Past Surgical History:   Procedure Laterality Date   • ABLATION OF DYSRHYTHMIC FOCUS  9/1/22    Left knee   • BLADDER SURGERY  11/2019    tumor removed   • CARDIAC CATHETERIZATION      no stents   • CHOLECYSTECTOMY  1990    laparoscopic    • COLON RESECTION Right 09/05/2019    Procedure: Open right hemicolectomy;  Surgeon: Dominick Cunha DO;  Location: [facility];  Service: General   • COLONOSCOPY     • ENDOSCOPY  12/2020   • ESOPHAGEAL DILATATION     • HEEL SPUR EXCISION Right 1994   • HYSTERECTOMY  1994    total due to heavy beeding and fibroids   •  "JOINT REPLACEMENT     • TONSILLECTOMY  1962   • TOTAL HIP ARTHROPLASTY Right 2010   • VEIN SURGERY  2014    Tied off veins to avoid blood clots in legs with Dr. Fregoso     /82   Pulse 69   Ht 167.6 cm (66\")   Wt 114 kg (251 lb 8 oz)   LMP  (LMP Unknown)   SpO2 98%   BMI 40.59 kg/m²   Family History   Problem Relation Age of Onset   • Colon cancer Brother 71   • Heart attack Brother    • Hypertension Brother    • Hypertension Sister         Stroke -dementia  diabetes       Current Outpatient Medications:   •  allopurinol (ZYLOPRIM) 100 MG tablet, Take 1 tablet by mouth Daily., Disp: , Rfl:   •  atenolol (TENORMIN) 25 MG tablet, Take 1 tablet by mouth Daily., Disp: , Rfl:   •  bumetanide (BUMEX) 1 MG tablet, Take 2 tablets by mouth Daily., Disp: , Rfl:   •  Calcium Carbonate-Vitamin D 600-200 MG-UNIT tablet, Take 1,200 mg by mouth Daily., Disp: , Rfl:   •  cetirizine (zyrTEC) 10 MG tablet, Take 1 tablet by mouth Daily., Disp: , Rfl:   •  Cholecalciferol (VITAMIN D3) 2000 units capsule, Take 1 capsule by mouth every night at bedtime., Disp: , Rfl:   •  CloNIDine (CATAPRES) 0.1 MG tablet, 1 tablet TID, Disp: , Rfl:   •  clotrimazole-betamethasone (LOTRISONE) 1-0.05 % cream, Apply 1 application topically to the appropriate area as directed 2 (Two) Times a Day. To begin after surgical dressing is removed on 5/30/22, Disp: , Rfl:   •  colchicine 0.6 MG tablet, Take 1 tablet by mouth 2 (Two) Times a Day As Needed for Muscle / Joint Pain (Gouty flareups)., Disp: , Rfl:   •  colestipol (COLESTID) 1 g tablet, Take 2 tablets by mouth 2 (Two) Times a Day., Disp: , Rfl: 1  •  DULoxetine (CYMBALTA) 60 MG capsule, Take 1 capsule by mouth Daily., Disp: , Rfl:   •  irbesartan (AVAPRO) 300 MG tablet, Take 1 tablet by mouth Daily., Disp: , Rfl:   •  irbesartan (AVAPRO) 300 MG tablet, Take 1 tablet by mouth Daily., Disp: , Rfl:   •  levothyroxine (SYNTHROID, LEVOTHROID) 100 MCG tablet, Take 1 tablet by mouth Daily., Disp: , " Rfl:   •  Melatonin 10 MG tablet, Take 1 tablet by mouth Daily., Disp: , Rfl:   •  pantoprazole (PROTONIX) 40 MG EC tablet, Take 1 tablet by mouth Daily., Disp: , Rfl:   •  potassium chloride (K-DUR) 10 MEQ CR tablet, Take 1 tablet by mouth Daily., Disp: , Rfl:   •  pravastatin (PRAVACHOL) 40 MG tablet, Take 1 tablet by mouth Every Night., Disp: , Rfl:   •  predniSONE (DELTASONE) 10 MG tablet, Take 5 mg by mouth Daily As Needed., Disp: , Rfl:   •  Probiotic Product (PROBIOTIC ADVANCED PO), Take 1 tablet by mouth Daily., Disp: , Rfl:   •  traZODone (DESYREL) 50 MG tablet, Take 25 mg by mouth Daily As Needed. insomnia, Disp: , Rfl:   •  vitamin B-12 (CYANOCOBALAMIN) 1000 MCG tablet, Take 1 tablet by mouth Daily., Disp: , Rfl:   •  vitamin C (ASCORBIC ACID) 500 MG tablet, Take 1 tablet by mouth Daily., Disp: , Rfl:   •  Xarelto 20 MG tablet, TAKE 1 TABLET DAILY, Disp: 90 tablet, Rfl: 3  Allergies   Allergen Reactions   • Codeine Nausea And Vomiting   • Hydrocodone-Acetaminophen Nausea And Vomiting     Nausea,vomiting     Social History     Socioeconomic History   • Marital status:    • Number of children: 4   Tobacco Use   • Smoking status: Never   • Smokeless tobacco: Never   Substance and Sexual Activity   • Alcohol use: No   • Drug use: No   • Sexual activity: Defer     Review of Systems   Constitutional: Positive for malaise/fatigue.   Cardiovascular: Negative for chest pain, dyspnea on exertion, leg swelling and palpitations.   Respiratory: Positive for shortness of breath. Negative for cough.    Gastrointestinal: Negative for abdominal pain, nausea and vomiting.   Neurological: Positive for numbness. Negative for dizziness, focal weakness, headaches and light-headedness.   All other systems reviewed and are negative.             Objective:     Constitutional:       Appearance: Well-developed.   Eyes:      General: No scleral icterus.     Conjunctiva/sclera: Conjunctivae normal.   HENT:      Head:  Normocephalic and atraumatic.   Neck:      Vascular: No carotid bruit or JVD.   Pulmonary:      Effort: Pulmonary effort is normal.      Breath sounds: Normal breath sounds. No wheezing. No rales.   Cardiovascular:      Normal rate. Irregularly irregular rhythm.   Pulses:     Intact distal pulses.   Abdominal:      General: Bowel sounds are normal.      Palpations: Abdomen is soft.   Musculoskeletal:      Cervical back: Normal range of motion and neck supple. Skin:     General: Skin is warm and dry.      Findings: No rash.   Neurological:      Mental Status: Alert.       Procedures    Lab Review:         MDM  1.  Coronary disease  Patient has nonobstructive disease in the past and is currently stable on medications  2.  Atrial fibrillation  Patient is currently stable on medications including Xarelto and atenolol  3.  Hyperlipidemia  Patient is currently on statins and the lipid levels are well within normal limits  4.  Sleep apnea  Patient has sleep apnea and uses a CPAP machine  5.  Hypertension  Patient blood pressure currently stable on atenolol and irbesartan.    Patient's previous medical records, labs, and EKG were reviewed and discussed with the patient at today's visit.

## 2023-05-31 ENCOUNTER — OFFICE (AMBULATORY)
Dept: URBAN - METROPOLITAN AREA CLINIC 64 | Facility: CLINIC | Age: 83
End: 2023-05-31

## 2023-05-31 ENCOUNTER — TELEPHONE (OUTPATIENT)
Dept: CARDIOLOGY | Facility: CLINIC | Age: 83
End: 2023-05-31

## 2023-05-31 VITALS
HEART RATE: 67 BPM | DIASTOLIC BLOOD PRESSURE: 63 MMHG | HEIGHT: 66 IN | WEIGHT: 253 LBS | SYSTOLIC BLOOD PRESSURE: 145 MMHG

## 2023-05-31 DIAGNOSIS — R13.10 DYSPHAGIA, UNSPECIFIED: ICD-10-CM

## 2023-05-31 DIAGNOSIS — Z13.810 ENCOUNTER FOR SCREENING FOR UPPER GASTROINTESTINAL DISORDER: ICD-10-CM

## 2023-05-31 DIAGNOSIS — Z12.11 ENCOUNTER FOR SCREENING FOR MALIGNANT NEOPLASM OF COLON: ICD-10-CM

## 2023-05-31 DIAGNOSIS — Z86.010 PERSONAL HISTORY OF COLONIC POLYPS: ICD-10-CM

## 2023-05-31 DIAGNOSIS — Z79.01 LONG TERM (CURRENT) USE OF ANTICOAGULANTS: ICD-10-CM

## 2023-05-31 DIAGNOSIS — Z85.038 PERSONAL HISTORY OF OTHER MALIGNANT NEOPLASM OF LARGE INTEST: ICD-10-CM

## 2023-05-31 PROCEDURE — 99214 OFFICE O/P EST MOD 30 MIN: CPT | Performed by: NURSE PRACTITIONER

## 2023-05-31 RX ORDER — POLYETHYLENE GLYCOL 3350 17 G/17G
17 POWDER, FOR SOLUTION ORAL
Qty: 1 | Refills: 11 | Status: ACTIVE
Start: 2023-05-31

## 2023-05-31 NOTE — TELEPHONE ENCOUNTER
YULIANA Arnold  EGD/Colonoscopy  Scheduled 8/11/23  Phone# [phone]  Fax# [phone]        Placed on Dr. Mtz's MA desk

## 2023-08-11 ENCOUNTER — ON CAMPUS - OUTPATIENT (AMBULATORY)
Dept: URBAN - METROPOLITAN AREA HOSPITAL 2 | Facility: HOSPITAL | Age: 83
End: 2023-08-11

## 2023-08-11 ENCOUNTER — OFFICE (AMBULATORY)
Dept: URBAN - METROPOLITAN AREA PATHOLOGY 4 | Facility: PATHOLOGY | Age: 83
End: 2023-08-11
Payer: COMMERCIAL

## 2023-08-11 ENCOUNTER — OFFICE (AMBULATORY)
Dept: URBAN - METROPOLITAN AREA PATHOLOGY 4 | Facility: PATHOLOGY | Age: 83
End: 2023-08-11

## 2023-08-11 VITALS
HEART RATE: 59 BPM | SYSTOLIC BLOOD PRESSURE: 148 MMHG | SYSTOLIC BLOOD PRESSURE: 165 MMHG | OXYGEN SATURATION: 99 % | SYSTOLIC BLOOD PRESSURE: 102 MMHG | DIASTOLIC BLOOD PRESSURE: 115 MMHG | HEART RATE: 64 BPM | DIASTOLIC BLOOD PRESSURE: 58 MMHG | RESPIRATION RATE: 16 BRPM | HEART RATE: 63 BPM | HEART RATE: 66 BPM | HEART RATE: 69 BPM | SYSTOLIC BLOOD PRESSURE: 131 MMHG | DIASTOLIC BLOOD PRESSURE: 101 MMHG | OXYGEN SATURATION: 100 % | DIASTOLIC BLOOD PRESSURE: 59 MMHG | OXYGEN SATURATION: 97 % | DIASTOLIC BLOOD PRESSURE: 54 MMHG | HEART RATE: 46 BPM | OXYGEN SATURATION: 98 % | DIASTOLIC BLOOD PRESSURE: 55 MMHG | SYSTOLIC BLOOD PRESSURE: 139 MMHG | SYSTOLIC BLOOD PRESSURE: 114 MMHG | DIASTOLIC BLOOD PRESSURE: 45 MMHG | HEIGHT: 66 IN | HEART RATE: 67 BPM | TEMPERATURE: 96.9 F | HEART RATE: 49 BPM | RESPIRATION RATE: 18 BRPM | SYSTOLIC BLOOD PRESSURE: 123 MMHG | DIASTOLIC BLOOD PRESSURE: 86 MMHG | SYSTOLIC BLOOD PRESSURE: 179 MMHG | SYSTOLIC BLOOD PRESSURE: 125 MMHG | HEART RATE: 54 BPM | WEIGHT: 236 LBS | DIASTOLIC BLOOD PRESSURE: 49 MMHG | DIASTOLIC BLOOD PRESSURE: 56 MMHG

## 2023-08-11 DIAGNOSIS — K31.7 POLYP OF STOMACH AND DUODENUM: ICD-10-CM

## 2023-08-11 DIAGNOSIS — K63.5 POLYP OF COLON: ICD-10-CM

## 2023-08-11 DIAGNOSIS — K57.30 DIVERTICULOSIS OF LARGE INTESTINE WITHOUT PERFORATION OR ABS: ICD-10-CM

## 2023-08-11 DIAGNOSIS — R13.10 DYSPHAGIA, UNSPECIFIED: ICD-10-CM

## 2023-08-11 DIAGNOSIS — Z85.038 PERSONAL HISTORY OF OTHER MALIGNANT NEOPLASM OF LARGE INTEST: ICD-10-CM

## 2023-08-11 LAB
GI HISTOLOGY: A. UNSPECIFIED: (no result)
GI HISTOLOGY: B. UNSPECIFIED: (no result)
GI HISTOLOGY: PDF REPORT: (no result)

## 2023-08-11 PROCEDURE — 45380 COLONOSCOPY AND BIOPSY: CPT | Mod: PT | Performed by: INTERNAL MEDICINE

## 2023-08-11 PROCEDURE — 43450 DILATE ESOPHAGUS 1/MULT PASS: CPT | Performed by: INTERNAL MEDICINE

## 2023-08-11 PROCEDURE — 43239 EGD BIOPSY SINGLE/MULTIPLE: CPT | Performed by: INTERNAL MEDICINE

## 2023-08-11 PROCEDURE — 88305 TISSUE EXAM BY PATHOLOGIST: CPT | Mod: 26 | Performed by: INTERNAL MEDICINE

## 2023-08-31 ENCOUNTER — HOSPITAL ENCOUNTER (OUTPATIENT)
Dept: CARDIOLOGY | Facility: HOSPITAL | Age: 83
Discharge: HOME | End: 2023-08-31
Payer: MEDICARE

## 2023-08-31 ENCOUNTER — APPOINTMENT (OUTPATIENT)
Dept: VASCULAR SURGERY | Facility: HOSPITAL | Age: 83
End: 2023-08-31
Payer: MEDICARE

## 2023-08-31 DIAGNOSIS — I73.9 PERIPHERAL VASCULAR DISEASE, UNSPECIFIED: ICD-10-CM

## 2023-08-31 LAB
BH CV LOWER ARTERIAL LEFT ABI RATIO: 0.92
BH CV LOWER ARTERIAL LEFT DORSALIS PEDIS SYS MAX: 139
BH CV LOWER ARTERIAL LEFT GREAT TOE SYS MAX: 70
BH CV LOWER ARTERIAL LEFT POST TIBIAL SYS MAX: 142
BH CV LOWER ARTERIAL LEFT TBI RATIO: 0.45
BH CV LOWER ARTERIAL RIGHT ABI RATIO: 0.96
BH CV LOWER ARTERIAL RIGHT DORSALIS PEDIS SYS MAX: 145
BH CV LOWER ARTERIAL RIGHT GREAT TOE SYS MAX: 75
BH CV LOWER ARTERIAL RIGHT POST TIBIAL SYS MAX: 148
BH CV LOWER ARTERIAL RIGHT TBI RATIO: 0.49
UPPER ARTERIAL LEFT ARM BRACHIAL SYS MAX: 149 MMHG
UPPER ARTERIAL RIGHT ARM BRACHIAL SYS MAX: 154 MMHG

## 2023-08-31 PROCEDURE — 93922 UPR/L XTREMITY ART 2 LEVELS: CPT

## 2023-08-31 PROCEDURE — G0463 HOSPITAL OUTPT CLINIC VISIT: HCPCS

## 2023-11-15 ENCOUNTER — OFFICE VISIT (OUTPATIENT)
Dept: CARDIOLOGY | Facility: CLINIC | Age: 83
End: 2023-11-15
Payer: MEDICARE

## 2023-11-15 ENCOUNTER — TRANSCRIBE ORDERS (OUTPATIENT)
Dept: VASCULAR SURGERY | Facility: HOSPITAL | Age: 83
End: 2023-11-15
Payer: MEDICARE

## 2023-11-15 VITALS
OXYGEN SATURATION: 100 % | BODY MASS INDEX: 38.57 KG/M2 | SYSTOLIC BLOOD PRESSURE: 132 MMHG | HEIGHT: 66 IN | DIASTOLIC BLOOD PRESSURE: 76 MMHG | WEIGHT: 240 LBS | HEART RATE: 68 BPM

## 2023-11-15 DIAGNOSIS — E78.00 PURE HYPERCHOLESTEROLEMIA: ICD-10-CM

## 2023-11-15 DIAGNOSIS — G47.33 OBSTRUCTIVE SLEEP APNEA SYNDROME: ICD-10-CM

## 2023-11-15 DIAGNOSIS — I25.10 CORONARY ARTERY DISEASE INVOLVING NATIVE CORONARY ARTERY OF NATIVE HEART WITHOUT ANGINA PECTORIS: ICD-10-CM

## 2023-11-15 DIAGNOSIS — I73.9 PVD (PERIPHERAL VASCULAR DISEASE): Primary | ICD-10-CM

## 2023-11-15 DIAGNOSIS — I48.20 CHRONIC ATRIAL FIBRILLATION: Primary | ICD-10-CM

## 2023-11-15 DIAGNOSIS — I65.23 BILATERAL CAROTID ARTERY OCCLUSION: ICD-10-CM

## 2023-11-15 DIAGNOSIS — I10 ESSENTIAL HYPERTENSION: ICD-10-CM

## 2023-11-15 PROCEDURE — 1159F MED LIST DOCD IN RCRD: CPT | Performed by: INTERNAL MEDICINE

## 2023-11-15 PROCEDURE — 99214 OFFICE O/P EST MOD 30 MIN: CPT | Performed by: INTERNAL MEDICINE

## 2023-11-15 PROCEDURE — 3078F DIAST BP <80 MM HG: CPT | Performed by: INTERNAL MEDICINE

## 2023-11-15 PROCEDURE — 1160F RVW MEDS BY RX/DR IN RCRD: CPT | Performed by: INTERNAL MEDICINE

## 2023-11-15 PROCEDURE — 3075F SYST BP GE 130 - 139MM HG: CPT | Performed by: INTERNAL MEDICINE

## 2023-11-15 NOTE — PROGRESS NOTES
Subjective:     Encounter Date:11/15/2023      Patient ID: Yasmeen Rahman is a 83 y.o. female.    Chief Complaint:  History of Present Illness 83-year-old white female with history of coronary disease atrial fibrillation hypertension hyperlipidemia carotid disease and sleep apnea presents to my office for follow-up.  Patient is currently stable without isms of chest pain but has some shortness of breath with exertion radiograms any PND orthopnea.  No palpitation dizziness syncope or swelling of the feet but she is taking her medicines regular.  She has significant back problems and hence uses a walker all the time.  She does not smoke    The following portions of the patient's history were reviewed and updated as appropriate: allergies, current medications, past family history, past medical history, past social history, past surgical history, and problem list.  Past Medical History:   Diagnosis Date    Anemia 2018    Anxiety 2018    Arthritis     Atrial fibrillation     CAD (coronary artery disease) 2013    CKD (chronic kidney disease) 2019    Colon cancer 09/2019    right hemicolectomy    GERD (gastroesophageal reflux disease) 2009    Gout 2009    High cholesterol 1989    Hypertension 1989    Hypothyroidism 2004    Lupus     PHOENIX (obstructive sleep apnea)     Osteopenia 2015    Osteoporosis 2004    PONV (postoperative nausea and vomiting)     Rheumatoid arthritis 2009     Past Surgical History:   Procedure Laterality Date    ABLATION OF DYSRHYTHMIC FOCUS  9/1/22    Left knee    BLADDER SURGERY  11/2019    tumor removed    CARDIAC CATHETERIZATION      no stents    CHOLECYSTECTOMY  1990    laparoscopic     COLON RESECTION Right 09/05/2019    Procedure: Open right hemicolectomy;  Surgeon: Dominick Cunha DO;  Location: [facility];  Service: General    COLONOSCOPY      ENDOSCOPY  12/2020    ESOPHAGEAL DILATATION      HEEL SPUR EXCISION Right 1994    HYSTERECTOMY  1994    total due to heavy beeding and  "fibroids    JOINT REPLACEMENT      TONSILLECTOMY  1962    TOTAL HIP ARTHROPLASTY Right 2010    VEIN SURGERY  2014    Tied off veins to avoid blood clots in legs with Dr. Fregoso     /76   Pulse 68   Ht 167.6 cm (66\")   Wt 109 kg (240 lb)   LMP  (LMP Unknown)   SpO2 100%   BMI 38.74 kg/m²   Family History   Problem Relation Age of Onset    Colon cancer Brother 71    Heart attack Brother     Hypertension Brother     Hypertension Sister         Stroke -dementia  diabetes       Current Outpatient Medications:     allopurinol (ZYLOPRIM) 100 MG tablet, Take 1 tablet by mouth Daily., Disp: , Rfl:     atenolol (TENORMIN) 25 MG tablet, Take 1 tablet by mouth Daily., Disp: , Rfl:     bumetanide (BUMEX) 1 MG tablet, Take 2 tablets by mouth Daily., Disp: , Rfl:     Calcium Carbonate-Vitamin D 600-200 MG-UNIT tablet, Take 1,200 mg by mouth Daily., Disp: , Rfl:     cetirizine (zyrTEC) 10 MG tablet, Take 1 tablet by mouth Daily., Disp: , Rfl:     Cholecalciferol (VITAMIN D3) 2000 units capsule, Take 1 capsule by mouth every night at bedtime., Disp: , Rfl:     CloNIDine (CATAPRES) 0.1 MG tablet, 1 tablet TID, Disp: , Rfl:     clotrimazole-betamethasone (LOTRISONE) 1-0.05 % cream, Apply 1 application  topically to the appropriate area as directed 2 (Two) Times a Day. To begin after surgical dressing is removed on 5/30/22, Disp: , Rfl:     colchicine 0.6 MG tablet, Take 1 tablet by mouth 2 (Two) Times a Day As Needed for Muscle / Joint Pain (Gouty flareups)., Disp: , Rfl:     colestipol (COLESTID) 1 g tablet, Take 2 tablets by mouth 2 (Two) Times a Day., Disp: , Rfl: 1    DULoxetine (CYMBALTA) 60 MG capsule, Take 1 capsule by mouth Daily., Disp: , Rfl:     irbesartan (AVAPRO) 300 MG tablet, Take 1 tablet by mouth Daily., Disp: , Rfl:     irbesartan (AVAPRO) 300 MG tablet, Take 1 tablet by mouth Daily., Disp: , Rfl:     levothyroxine (SYNTHROID, LEVOTHROID) 100 MCG tablet, Take 1 tablet by mouth Daily., Disp: , Rfl:     " Melatonin 10 MG tablet, Take 1 tablet by mouth Daily., Disp: , Rfl:     pantoprazole (PROTONIX) 40 MG EC tablet, Take 1 tablet by mouth Daily., Disp: , Rfl:     potassium chloride (K-DUR) 10 MEQ CR tablet, Take 1 tablet by mouth Daily., Disp: , Rfl:     pravastatin (PRAVACHOL) 40 MG tablet, Take 1 tablet by mouth Every Night., Disp: , Rfl:     predniSONE (DELTASONE) 10 MG tablet, Take 0.5 tablets by mouth Daily As Needed., Disp: , Rfl:     Probiotic Product (PROBIOTIC ADVANCED PO), Take 1 tablet by mouth Daily., Disp: , Rfl:     traZODone (DESYREL) 50 MG tablet, Take 0.5 tablets by mouth Daily As Needed. insomnia, Disp: , Rfl:     vitamin B-12 (CYANOCOBALAMIN) 1000 MCG tablet, Take 1 tablet by mouth Daily., Disp: , Rfl:     vitamin C (ASCORBIC ACID) 500 MG tablet, Take 1 tablet by mouth Daily., Disp: , Rfl:     Xarelto 20 MG tablet, TAKE 1 TABLET DAILY, Disp: 90 tablet, Rfl: 3  Allergies   Allergen Reactions    Codeine Nausea And Vomiting    Hydrocodone-Acetaminophen Nausea And Vomiting     Nausea,vomiting    Penicillins Unknown - Low Severity     Social History     Socioeconomic History    Marital status:     Number of children: 4   Tobacco Use    Smoking status: Never     Passive exposure: Never    Smokeless tobacco: Never   Vaping Use    Vaping Use: Never used   Substance and Sexual Activity    Alcohol use: No    Drug use: No    Sexual activity: Defer     Review of Systems   Constitutional: Negative for malaise/fatigue.   Cardiovascular:  Negative for chest pain, dyspnea on exertion, leg swelling and palpitations.   Respiratory:  Positive for shortness of breath. Negative for cough.    Gastrointestinal:  Negative for abdominal pain, nausea and vomiting.   Neurological:  Positive for numbness. Negative for dizziness, focal weakness, headaches and light-headedness.   All other systems reviewed and are negative.             Objective:     Constitutional:       Appearance: Well-developed.   Eyes:      General:  No scleral icterus.     Conjunctiva/sclera: Conjunctivae normal.   HENT:      Head: Normocephalic and atraumatic.   Neck:      Vascular: No carotid bruit or JVD.   Pulmonary:      Effort: Pulmonary effort is normal.      Breath sounds: Normal breath sounds. No wheezing. No rales.   Cardiovascular:      Normal rate. Regular rhythm.   Pulses:     Intact distal pulses.   Abdominal:      General: Bowel sounds are normal.      Palpations: Abdomen is soft.   Musculoskeletal:      Cervical back: Normal range of motion and neck supple. Skin:     General: Skin is warm and dry.      Findings: No rash.   Neurological:      Mental Status: Alert.       Procedures    Lab Review:         MDM    #1 coronary disease  Patient has nonobstructive disease in the past and is currently stable on medications with normal lv function.  2.  Atrial fibrillation  Patient has history of atrial fibrillation and is currently stable on atenolol for rate control and Xarelto for anticoagulation  3.  Hypertension  Patient blood pressure currently stable on medications including atenolol and irbesartan  4.  Hyperlipidemia  Patient is on statins and the lipid levels are well within normal limits  5.  Sleep apnea  Patient has sleep apnea and uses a CPAP machine    Patient's previous medical records, labs, and EKG were reviewed and discussed with the patient at today's visit.

## 2023-11-20 RX ORDER — RIVAROXABAN 20 MG/1
TABLET, FILM COATED ORAL
Qty: 90 TABLET | Refills: 3 | Status: SHIPPED | OUTPATIENT
Start: 2023-11-20

## 2023-11-20 NOTE — TELEPHONE ENCOUNTER
Rx Refill Note  Requested Prescriptions     Pending Prescriptions Disp Refills    Xarelto 20 MG tablet [Pharmacy Med Name: XARELTO TABS 20MG] 90 tablet 3     Sig: TAKE 1 TABLET DAILY      Last office visit with prescribing clinician: 11/15/2023   Last telemedicine visit with prescribing clinician: Visit date not found   Next office visit with prescribing clinician: 5/16/2024                         Would you like a call back once the refill request has been completed: [] Yes [] No    If the office needs to give you a call back, can they leave a voicemail: [] Yes [] No    Richelle Ray MA  11/20/23, 08:44 EST

## 2024-05-16 ENCOUNTER — OFFICE VISIT (OUTPATIENT)
Dept: CARDIOLOGY | Facility: CLINIC | Age: 84
End: 2024-05-16
Payer: MEDICARE

## 2024-05-16 VITALS
HEIGHT: 66 IN | HEART RATE: 53 BPM | SYSTOLIC BLOOD PRESSURE: 139 MMHG | WEIGHT: 241 LBS | OXYGEN SATURATION: 99 % | BODY MASS INDEX: 38.73 KG/M2 | DIASTOLIC BLOOD PRESSURE: 76 MMHG

## 2024-05-16 DIAGNOSIS — E78.00 PURE HYPERCHOLESTEROLEMIA: ICD-10-CM

## 2024-05-16 DIAGNOSIS — I48.20 CHRONIC ATRIAL FIBRILLATION: Primary | ICD-10-CM

## 2024-05-16 DIAGNOSIS — I10 ESSENTIAL HYPERTENSION: ICD-10-CM

## 2024-05-16 DIAGNOSIS — I25.10 CORONARY ARTERY DISEASE INVOLVING NATIVE CORONARY ARTERY OF NATIVE HEART WITHOUT ANGINA PECTORIS: ICD-10-CM

## 2024-05-16 DIAGNOSIS — G47.33 OBSTRUCTIVE SLEEP APNEA SYNDROME: ICD-10-CM

## 2024-05-16 PROCEDURE — 3078F DIAST BP <80 MM HG: CPT | Performed by: INTERNAL MEDICINE

## 2024-05-16 PROCEDURE — 1159F MED LIST DOCD IN RCRD: CPT | Performed by: INTERNAL MEDICINE

## 2024-05-16 PROCEDURE — 99214 OFFICE O/P EST MOD 30 MIN: CPT | Performed by: INTERNAL MEDICINE

## 2024-05-16 PROCEDURE — 3075F SYST BP GE 130 - 139MM HG: CPT | Performed by: INTERNAL MEDICINE

## 2024-05-16 PROCEDURE — 1160F RVW MEDS BY RX/DR IN RCRD: CPT | Performed by: INTERNAL MEDICINE

## 2024-05-16 RX ORDER — ROSUVASTATIN CALCIUM 10 MG/1
10 TABLET, COATED ORAL DAILY
COMMUNITY

## 2024-05-16 RX ORDER — ESCITALOPRAM OXALATE 10 MG/1
10 TABLET ORAL DAILY
COMMUNITY

## 2024-05-16 RX ORDER — COLESEVELAM 180 1/1
1875 TABLET ORAL 2 TIMES DAILY WITH MEALS
COMMUNITY

## 2024-05-16 NOTE — PROGRESS NOTES
Subjective:     Encounter Date:05/16/2024      Patient ID: Yasmeen Rahman is a 83 y.o. female.    Chief Complaint:  History of Present Illness 83-year-old white female with history of coronary artery disease hypertension hyperlipidemia sleep apnea and chronic atrial fibrillation presents to the office for a follow-up.  Patient is currently stable without any chest pain or shortness of breath at rest or exertion radiograms any PND orthopnea.  No palpitation dizziness syncope patient has some swelling of the feet.  Patient is taking all the medicines regular.  Patient does not smoke.    The following portions of the patient's history were reviewed and updated as appropriate: allergies, current medications, past family history, past medical history, past social history, past surgical history, and problem list.  Past Medical History:   Diagnosis Date    Anemia 2018    Anxiety 2018    Arthritis     Atrial fibrillation     CAD (coronary artery disease) 2013    CKD (chronic kidney disease) 2019    Colon cancer 09/2019    right hemicolectomy    GERD (gastroesophageal reflux disease) 2009    Gout 2009    High cholesterol 1989    Hypertension 1989    Hypothyroidism 2004    Lupus     PHOENIX (obstructive sleep apnea)     Osteopenia 2015    Osteoporosis 2004    PONV (postoperative nausea and vomiting)     Rheumatoid arthritis 2009     Past Surgical History:   Procedure Laterality Date    ABLATION OF DYSRHYTHMIC FOCUS  9/1/22    Left knee    BLADDER SURGERY  11/2019    tumor removed    CARDIAC CATHETERIZATION      no stents    CHOLECYSTECTOMY  1990    laparoscopic     COLON RESECTION Right 09/05/2019    Procedure: Open right hemicolectomy;  Surgeon: Dominick Cunha DO;  Location: [facility];  Service: General    COLONOSCOPY      ENDOSCOPY  12/2020    ESOPHAGEAL DILATATION      HEEL SPUR EXCISION Right 1994    HYSTERECTOMY  1994    total due to heavy beeding and fibroids    JOINT REPLACEMENT      TONSILLECTOMY  1962  "   TOTAL HIP ARTHROPLASTY Right 2010    VEIN SURGERY  2014    Tied off veins to avoid blood clots in legs with Dr. Fregoso     /76   Pulse 53   Ht 167.6 cm (66\")   Wt 109 kg (241 lb)   LMP  (LMP Unknown)   SpO2 99%   BMI 38.90 kg/m²   Family History   Problem Relation Age of Onset    Colon cancer Brother 71    Heart attack Brother     Hypertension Brother     Hypertension Sister         Stroke -dementia  diabetes       Current Outpatient Medications:     allopurinol (ZYLOPRIM) 100 MG tablet, Take 1 tablet by mouth Daily., Disp: , Rfl:     atenolol (TENORMIN) 25 MG tablet, Take 1 tablet by mouth Daily., Disp: , Rfl:     bumetanide (BUMEX) 1 MG tablet, Take 2 tablets by mouth Daily., Disp: , Rfl:     Calcium Carbonate-Vitamin D 600-200 MG-UNIT tablet, Take 1,200 mg by mouth Daily., Disp: , Rfl:     cetirizine (zyrTEC) 10 MG tablet, Take 1 tablet by mouth Daily., Disp: , Rfl:     Cholecalciferol (VITAMIN D3) 2000 units capsule, Take 1 capsule by mouth every night at bedtime., Disp: , Rfl:     CloNIDine (CATAPRES) 0.1 MG tablet, 1 tablet TID, Disp: , Rfl:     clotrimazole-betamethasone (LOTRISONE) 1-0.05 % cream, Apply 1 Application topically to the appropriate area as directed 2 (Two) Times a Day. To begin after surgical dressing is removed on 5/30/22, Disp: , Rfl:     colchicine 0.6 MG tablet, Take 1 tablet by mouth 2 (Two) Times a Day As Needed for Muscle / Joint Pain (Gouty flareups)., Disp: , Rfl:     colesevelam (WELCHOL) 625 MG tablet, Take 3 tablets by mouth 2 (Two) Times a Day With Meals., Disp: , Rfl:     escitalopram (LEXAPRO) 10 MG tablet, Take 1 tablet by mouth Daily., Disp: , Rfl:     irbesartan (AVAPRO) 300 MG tablet, Take 1 tablet by mouth Daily., Disp: , Rfl:     irbesartan (AVAPRO) 300 MG tablet, Take 1 tablet by mouth Daily., Disp: , Rfl:     levothyroxine (SYNTHROID, LEVOTHROID) 100 MCG tablet, Take 1 tablet by mouth Daily., Disp: , Rfl:     Melatonin 10 MG tablet, Take 1 tablet by mouth " Daily., Disp: , Rfl:     pantoprazole (PROTONIX) 40 MG EC tablet, Take 1 tablet by mouth Daily., Disp: , Rfl:     potassium chloride (K-DUR) 10 MEQ CR tablet, Take 1 tablet by mouth Daily., Disp: , Rfl:     predniSONE (DELTASONE) 10 MG tablet, Take 0.5 tablets by mouth Daily As Needed., Disp: , Rfl:     Probiotic Product (PROBIOTIC ADVANCED PO), Take 1 tablet by mouth Daily., Disp: , Rfl:     rosuvastatin (CRESTOR) 10 MG tablet, Take 1 tablet by mouth Daily., Disp: , Rfl:     traZODone (DESYREL) 50 MG tablet, Take 0.5 tablets by mouth Daily As Needed. insomnia, Disp: , Rfl:     vitamin B-12 (CYANOCOBALAMIN) 1000 MCG tablet, Take 1 tablet by mouth Daily., Disp: , Rfl:     vitamin C (ASCORBIC ACID) 500 MG tablet, Take 1 tablet by mouth Daily., Disp: , Rfl:     Xarelto 20 MG tablet, TAKE 1 TABLET DAILY, Disp: 90 tablet, Rfl: 3    colestipol (COLESTID) 1 g tablet, Take 2 tablets by mouth 2 (Two) Times a Day. (Patient not taking: Reported on 5/16/2024), Disp: , Rfl: 1    DULoxetine (CYMBALTA) 60 MG capsule, Take 1 capsule by mouth Daily. (Patient not taking: Reported on 5/16/2024), Disp: , Rfl:     pravastatin (PRAVACHOL) 40 MG tablet, Take 1 tablet by mouth Every Night., Disp: , Rfl:   Allergies   Allergen Reactions    Codeine Nausea And Vomiting    Hydrocodone-Acetaminophen Nausea And Vomiting     Nausea,vomiting    Penicillins Unknown - Low Severity     Social History     Socioeconomic History    Marital status:     Number of children: 4   Tobacco Use    Smoking status: Never     Passive exposure: Never    Smokeless tobacco: Never   Vaping Use    Vaping status: Never Used   Substance and Sexual Activity    Alcohol use: No    Drug use: No    Sexual activity: Defer     Review of Systems   Constitutional: Negative for malaise/fatigue.   Cardiovascular:  Positive for leg swelling. Negative for chest pain, dyspnea on exertion and palpitations.   Respiratory:  Negative for cough and shortness of breath.     Gastrointestinal:  Negative for abdominal pain, nausea and vomiting.   Neurological:  Negative for dizziness, focal weakness, headaches, light-headedness and numbness.   All other systems reviewed and are negative.             Objective:     Constitutional:       Appearance: Well-developed.   Eyes:      General: No scleral icterus.     Conjunctiva/sclera: Conjunctivae normal.   HENT:      Head: Normocephalic and atraumatic.   Neck:      Vascular: No carotid bruit or JVD.   Pulmonary:      Effort: Pulmonary effort is normal.      Breath sounds: Normal breath sounds. No wheezing. No rales.   Cardiovascular:      Normal rate. Regular rhythm.   Pulses:     Intact distal pulses.   Edema:     Peripheral edema present.  Abdominal:      General: Bowel sounds are normal.      Palpations: Abdomen is soft.   Musculoskeletal:      Cervical back: Normal range of motion and neck supple. Skin:     General: Skin is warm and dry.      Findings: No rash.   Neurological:      Mental Status: Alert.       Procedures    Lab Review:         MDM  #1 coronary artery disease  Patient has normal full coronary disease and is currently Stable on medications with normal LV function  2.  Hypertension  Patient blood pressure currently stable on medications including irbesartan  3.  Hyperlipidemia  Patient is currently on Crestor l and the lipid levels are followed by the primary care doctor  4.  Atrial fibrillation  Patient is currently being in atrial fibrillation and is on Xarelto for anticoagulation  5.  Sleep apnea  Patient is sleep apnea and uses a CPAP machine.    Patient's previous medical records, labs, and EKG were reviewed and discussed with the patient at today's visit.

## 2024-08-15 NOTE — PLAN OF CARE
"Daily Note     Today's date: 8/15/2024  Patient name: Cruz Irvin  : 1997  MRN: [id number]  Referring provider: Dannielle Augustine CRNP  Dx:   Encounter Diagnosis     ICD-10-CM    1. Traumatic rupture of plantar fascia, left, subsequent encounter  S93.692D       2. Chronic foot pain, left  M79.672     G89.29       3. Left foot pain  M79.672           Start Time: 1115  Stop Time: 1205  Total time in clinic (min): 50 minutes    Subjective: Patient reports foot as \"pretty good\" at start of session today. He reports no complaints after previous session.       Objective: See treatment diary below      Assessment: Tolerated treatment well. Patient demonstrated fatigue post treatment, exhibited good technique with therapeutic exercises, and would benefit from continued PT      Plan: Continue per plan of care.  Progress treatment as tolerated.       Precautions:       Manuals 8/8 8/12 8/15  7/12 7/16 7/18 7/23 7/25 7/30 8/6   Problem: Pain, Acute (Adult)  Goal: Identify Related Risk Factors and Signs and Symptoms  Outcome: Ongoing (interventions implemented as appropriate)   09/11/19 0901   Pain, Acute (Adult)   Related Risk Factors (Acute Pain) patient perception;procedure/treatment;surgery   Signs and Symptoms (Acute Pain) constipation/diarrhea;fatigue/weakness;nausea/vomiting/anorexia          "

## 2024-08-27 NOTE — PROGRESS NOTES
"[facility] VASCULAR SURGERY    Chief Complaint  Peripheral Vascular Disease    Subjective          History of Present Illness  Yasmeen Rahman is a 84 y.o. female with peripheral arterial disease. She presents to the office today for follow up. She is followed by Dr. Escobar. She has had the following vascular interventions performed:    Bilateral GSV ablation for varicose veins by Dr. Fregoso in 2016     She tells me a couple of months ago she \"broke a muscle in her back.\"  Since that time she has had difficulty walking, worsening back pain, and worsening sensations in her left leg at night.  She is followed by pain management and is planned for an injection tomorrow and ablation of her spinal nerves later.  She denies any claudication.  She has no rest pain.  She has no wounds to her lower extremities.  She is maintained on Xarelto for her history of atrial fibrillation.  She is also on a statin. She reports compliance with her medications. She has never smoked.    Review of Systems   Musculoskeletal:  Negative for myalgias.   Skin:  Negative for skin lesions and wound.        Allergies: Codeine, Hydrocodone-acetaminophen, and Penicillins    Prior to Admission medications    Medication Sig Start Date End Date Taking? Authorizing Provider   allopurinol (ZYLOPRIM) 100 MG tablet Take 1 tablet by mouth Daily. 12/30/19   Jennifer Correia MD   atenolol (TENORMIN) 25 MG tablet Take 1 tablet by mouth Daily.    Jennifer Correia MD   bumetanide (BUMEX) 1 MG tablet Take 2 tablets by mouth Daily. 6/13/19   Jennifer Correia MD   Calcium Carbonate-Vitamin D 600-200 MG-UNIT tablet Take 1,200 mg by mouth Daily.    Jennifer Correia MD   cetirizine (zyrTEC) 10 MG tablet Take 1 tablet by mouth Daily.    Jennifer Correia MD   Cholecalciferol (VITAMIN D3) 2000 units capsule Take 1 capsule by mouth every night at bedtime. 3/24/14   Jennifer Correia MD   CloNIDine (CATAPRES) 0.1 MG " tablet 1 tablet TID 3/11/22   Jennifer Correia MD   clotrimazole-betamethasone (LOTRISONE) 1-0.05 % cream Apply 1 Application topically to the appropriate area as directed 2 (Two) Times a Day. To begin after surgical dressing is removed on 5/30/22 5/30/22   Jennifer Correia MD   colchicine 0.6 MG tablet Take 1 tablet by mouth 2 (Two) Times a Day As Needed for Muscle / Joint Pain (Gouty flareups). 4/4/22   Jennifer Correia MD   colesevelam (WELCHOL) 625 MG tablet Take 3 tablets by mouth 2 (Two) Times a Day With Meals.    Jennifer Correia MD   colestipol (COLESTID) 1 g tablet Take 2 tablets by mouth 2 (Two) Times a Day.  Patient not taking: Reported on 5/16/2024 9/12/19   Jennifer Correia MD   DULoxetine (CYMBALTA) 60 MG capsule Take 1 capsule by mouth Daily.  Patient not taking: Reported on 5/16/2024 3/1/22   Jennifer Correia MD   escitalopram (LEXAPRO) 10 MG tablet Take 1 tablet by mouth Daily.    Jennifer Correia MD   irbesartan (AVAPRO) 300 MG tablet Take 1 tablet by mouth Daily. 12/30/19   Jennifer Correia MD   irbesartan (AVAPRO) 300 MG tablet Take 1 tablet by mouth Daily. 1/1/22   Jennifer Correia MD   levothyroxine (SYNTHROID, LEVOTHROID) 100 MCG tablet Take 1 tablet by mouth Daily.    Jennifer Correia MD   Melatonin 10 MG tablet Take 1 tablet by mouth Daily. 1/1/22   Jennifer Correia MD   pantoprazole (PROTONIX) 40 MG EC tablet Take 1 tablet by mouth Daily. 12/11/19   Jennifer Correia MD   potassium chloride (K-DUR) 10 MEQ CR tablet Take 1 tablet by mouth Daily. 6/13/19   Jennifer Correia MD   pravastatin (PRAVACHOL) 40 MG tablet Take 1 tablet by mouth Every Night. 3/24/14   Jennifer Correia MD   predniSONE (DELTASONE) 10 MG tablet Take 0.5 tablets by mouth Daily As Needed. 1/1/22   Jennifer Correia MD   Probiotic Product (PROBIOTIC ADVANCED PO) Take 1 tablet by mouth Daily. 1/1/22   Jennifer Correia MD   rosuvastatin (CRESTOR) 10  "MG tablet Take 1 tablet by mouth Daily.    Jennifer Correia MD   traZODone (DESYREL) 50 MG tablet Take 0.5 tablets by mouth Daily As Needed. insomnia 2/2/22   Jennifer Correia MD   vitamin B-12 (CYANOCOBALAMIN) 1000 MCG tablet Take 1 tablet by mouth Daily.    Jennifer Correia MD   vitamin C (ASCORBIC ACID) 500 MG tablet Take 1 tablet by mouth Daily.    Jennifer Correia MD   Xarelto 20 MG tablet TAKE 1 TABLET DAILY 11/20/23   Trever Mtz MD         Objective   Vital Signs:  /61 (BP Location: Right arm, Patient Position: Sitting, Cuff Size: Adult)   Ht 167.6 cm (66\")   Wt 104 kg (230 lb)   BMI 37.12 kg/m²   Estimated body mass index is 37.12 kg/m² as calculated from the following:    Height as of this encounter: 167.6 cm (66\").    Weight as of this encounter: 104 kg (230 lb).       Physical Exam  Vitals reviewed.   Constitutional:       General: She is not in acute distress.     Appearance: She is not ill-appearing.   Cardiovascular:      Pulses:           Radial pulses are 2+ on the right side and 2+ on the left side.        Dorsalis pedis pulses are 2+ on the right side and 2+ on the left side.        Posterior tibial pulses are 2+ on the right side and 2+ on the left side.   Pulmonary:      Effort: Pulmonary effort is normal. No respiratory distress.   Musculoskeletal:      Right ankle: Swelling present.      Left ankle: Swelling present.   Neurological:      General: No focal deficit present.      Mental Status: She is alert and oriented to person, place, and time.        Result Review :    The following data was reviewed by: PAIGE Sanchez on 08/28/2024:  Doppler ankle brachial index single level CAR (08/28/2024 09:44)     Right Conclusion: The right ANU is normal. Normal digital pressures.  1 with digit pressure of 128 mmHg     Left Conclusion: The left ANU is normal. Mild digital insufficiency.  1.19 with digit pressure of 90 mmHg     Progress Notes by Trever Mtz MD " (05/16/2024 14:20)      Assessment and Plan     Diagnoses and all orders for this visit:    1. PAD (peripheral artery disease) (Primary)  -     Doppler Ankle Brachial Index Single Level CAR; Future    2. Pure hypercholesterolemia    3. Primary hypertension    4. Obesity (BMI 30-39.9)    Class 2 Severe Obesity (BMI >=35 and <=39.9). Information on healthy weight added to patient's after visit summary.         Yasmeen Rahman is a 84 y.o. female with peripheral arterial disease. She denies any claudication. She has no rest pain or wounds to his legs or feet. Her most recent ABIs show normal arterial circulation in the bilateral lower extremities, right with an ANU of 1 and digit pressure of 128 mmHg, left 1.19 with digit pressure of 90 mmHg.  She continues to have improvement in her digit pressures on the left.  ABIs have remained stable. She is maintained on appropriate anticoagulation, statin, and antihypertensive medications which we recommend she continue. I will plan to see her back in the office in 1 year with an ANU. She was instructed to call our office if she had any questions or concerns.     Follow Up     Return in about 1 year (around 8/28/2025) for ANU.    Patient was given instructions and counseling regarding her condition or for health maintenance advice. Please see specific information pulled into the AVS if appropriate.     PAIGE Sanchez

## 2024-08-28 ENCOUNTER — HOSPITAL ENCOUNTER (OUTPATIENT)
Dept: CARDIOLOGY | Facility: HOSPITAL | Age: 84
Discharge: HOME | End: 2024-08-28
Admitting: NURSE PRACTITIONER
Payer: MEDICARE

## 2024-08-28 ENCOUNTER — OFFICE VISIT (OUTPATIENT)
Age: 84
End: 2024-08-28
Payer: MEDICARE

## 2024-08-28 VITALS
WEIGHT: 230 LBS | BODY MASS INDEX: 36.96 KG/M2 | SYSTOLIC BLOOD PRESSURE: 140 MMHG | DIASTOLIC BLOOD PRESSURE: 61 MMHG | HEIGHT: 66 IN

## 2024-08-28 DIAGNOSIS — I10 PRIMARY HYPERTENSION: ICD-10-CM

## 2024-08-28 DIAGNOSIS — I73.9 PAD (PERIPHERAL ARTERY DISEASE): Primary | ICD-10-CM

## 2024-08-28 DIAGNOSIS — I73.9 PVD (PERIPHERAL VASCULAR DISEASE): ICD-10-CM

## 2024-08-28 DIAGNOSIS — E66.9 OBESITY (BMI 30-39.9): ICD-10-CM

## 2024-08-28 DIAGNOSIS — E78.00 PURE HYPERCHOLESTEROLEMIA: ICD-10-CM

## 2024-08-28 LAB
BH CV LOWER ARTERIAL LEFT ABI RATIO: 1.19
BH CV LOWER ARTERIAL LEFT DORSALIS PEDIS SYS MAX: 138
BH CV LOWER ARTERIAL LEFT GREAT TOE SYS MAX: 90
BH CV LOWER ARTERIAL LEFT POST TIBIAL SYS MAX: 172
BH CV LOWER ARTERIAL LEFT TBI RATIO: 0.62
BH CV LOWER ARTERIAL RIGHT ABI RATIO: 1
BH CV LOWER ARTERIAL RIGHT DORSALIS PEDIS SYS MAX: 135
BH CV LOWER ARTERIAL RIGHT GREAT TOE SYS MAX: 128
BH CV LOWER ARTERIAL RIGHT POST TIBIAL SYS MAX: 145
BH CV LOWER ARTERIAL RIGHT TBI RATIO: 0.88
UPPER ARTERIAL LEFT ARM BRACHIAL SYS MAX: 139
UPPER ARTERIAL RIGHT ARM BRACHIAL SYS MAX: 145

## 2024-08-28 PROCEDURE — 93922 UPR/L XTREMITY ART 2 LEVELS: CPT

## 2024-10-02 ENCOUNTER — TELEPHONE (OUTPATIENT)
Dept: CARDIOLOGY | Facility: CLINIC | Age: 84
End: 2024-10-02
Payer: MEDICARE

## 2024-10-02 NOTE — TELEPHONE ENCOUNTER
FACILITY: [facility]   DR: park  PHONE: [phone]  FAX: [phone]  PROCEDURE: ESTEE  SCHEDULED: TBD  MEDS TO HOLD: Xarelto

## 2024-10-31 ENCOUNTER — TELEPHONE (OUTPATIENT)
Dept: CARDIOLOGY | Facility: CLINIC | Age: 84
End: 2024-10-31
Payer: MEDICARE

## 2024-10-31 NOTE — TELEPHONE ENCOUNTER
[facility] received a fax that requires your attention. The document has been indexed to the patient’s chart for your review.      Reason for sending: EXTERNAL MEDICAL RECORD NOTIFICATION     Documents Description: CARDIAC CLEARANCE REQ-[facility]-10.31.24    Name of Sender: [facility]     Date Indexed: 10.31.24

## 2024-11-01 NOTE — TELEPHONE ENCOUNTER
RECEIVED CLEARANCE THE PAPERWORK IS IN THE NOV FOLDER HOLDING UNTIL A MONTH BEFORE SX WILL START CLEARANCE ON 11-16-24

## 2024-11-25 ENCOUNTER — HOSPITAL ENCOUNTER (OUTPATIENT)
Dept: CARDIOLOGY | Facility: HOSPITAL | Age: 84
Discharge: HOME | End: 2024-11-25
Payer: MEDICARE

## 2024-11-25 ENCOUNTER — TRANSCRIBE ORDERS (OUTPATIENT)
Dept: ADMINISTRATIVE | Facility: HOSPITAL | Age: 84
End: 2024-11-25
Payer: MEDICARE

## 2024-11-25 ENCOUNTER — LAB (OUTPATIENT)
Dept: LAB | Facility: HOSPITAL | Age: 84
End: 2024-11-25
Payer: MEDICARE

## 2024-11-25 DIAGNOSIS — Z01.818 OTHER SPECIFIED PRE-OPERATIVE EXAMINATION: Primary | ICD-10-CM

## 2024-11-25 DIAGNOSIS — Z01.818 OTHER SPECIFIED PRE-OPERATIVE EXAMINATION: ICD-10-CM

## 2024-11-25 LAB
ANION GAP SERPL CALCULATED.3IONS-SCNC: 13.3 MMOL/L (ref 5–15)
BASOPHILS # BLD AUTO: 0.02 10*3/MM3 (ref 0–0.2)
BASOPHILS NFR BLD AUTO: 0.2 % (ref 0–1.5)
BUN SERPL-MCNC: 20 MG/DL (ref 8–23)
BUN/CREAT SERPL: 25 (ref 7–25)
CALCIUM SPEC-SCNC: 9.5 MG/DL (ref 8.6–10.5)
CHLORIDE SERPL-SCNC: 98 MMOL/L (ref 98–107)
CO2 SERPL-SCNC: 24.7 MMOL/L (ref 22–29)
CREAT SERPL-MCNC: 0.8 MG/DL (ref 0.57–1)
DEPRECATED RDW RBC AUTO: 58.5 FL (ref 37–54)
EGFRCR SERPLBLD CKD-EPI 2021: 73.2 ML/MIN/1.73
EOSINOPHIL # BLD AUTO: 0.13 10*3/MM3 (ref 0–0.4)
EOSINOPHIL NFR BLD AUTO: 1.4 % (ref 0.3–6.2)
ERYTHROCYTE [DISTWIDTH] IN BLOOD BY AUTOMATED COUNT: 16 % (ref 12.3–15.4)
GLUCOSE SERPL-MCNC: 94 MG/DL (ref 65–99)
HCT VFR BLD AUTO: 37.4 % (ref 34–46.6)
HGB BLD-MCNC: 11.6 G/DL (ref 12–15.9)
IMM GRANULOCYTES # BLD AUTO: 0.05 10*3/MM3 (ref 0–0.05)
IMM GRANULOCYTES NFR BLD AUTO: 0.5 % (ref 0–0.5)
LYMPHOCYTES # BLD AUTO: 1.77 10*3/MM3 (ref 0.7–3.1)
LYMPHOCYTES NFR BLD AUTO: 18.6 % (ref 19.6–45.3)
MCH RBC QN AUTO: 30.6 PG (ref 26.6–33)
MCHC RBC AUTO-ENTMCNC: 31 G/DL (ref 31.5–35.7)
MCV RBC AUTO: 98.7 FL (ref 79–97)
MONOCYTES # BLD AUTO: 0.76 10*3/MM3 (ref 0.1–0.9)
MONOCYTES NFR BLD AUTO: 8 % (ref 5–12)
NEUTROPHILS NFR BLD AUTO: 6.8 10*3/MM3 (ref 1.7–7)
NEUTROPHILS NFR BLD AUTO: 71.3 % (ref 42.7–76)
NRBC BLD AUTO-RTO: 0 /100 WBC (ref 0–0.2)
PLATELET # BLD AUTO: 248 10*3/MM3 (ref 140–450)
PMV BLD AUTO: 9.5 FL (ref 6–12)
POTASSIUM SERPL-SCNC: 4.1 MMOL/L (ref 3.5–5.2)
QT INTERVAL: 416 MS
QTC INTERVAL: 452 MS
RBC # BLD AUTO: 3.79 10*6/MM3 (ref 3.77–5.28)
SODIUM SERPL-SCNC: 136 MMOL/L (ref 136–145)
WBC NRBC COR # BLD AUTO: 9.53 10*3/MM3 (ref 3.4–10.8)

## 2024-11-25 PROCEDURE — 36415 COLL VENOUS BLD VENIPUNCTURE: CPT

## 2024-11-25 PROCEDURE — 80048 BASIC METABOLIC PNL TOTAL CA: CPT

## 2024-11-25 PROCEDURE — 93005 ELECTROCARDIOGRAM TRACING: CPT | Performed by: FAMILY MEDICINE

## 2024-11-25 PROCEDURE — 85025 COMPLETE CBC W/AUTO DIFF WBC: CPT

## 2024-12-02 ENCOUNTER — TELEPHONE (OUTPATIENT)
Dept: CARDIOLOGY | Facility: CLINIC | Age: 84
End: 2024-12-02
Payer: MEDICARE

## 2024-12-02 NOTE — TELEPHONE ENCOUNTER
REQUESTING SAMPLES OF XARELTO. SHE IS IN [facility] WITH MEDICARE. CALL BACK [phone].

## 2024-12-10 ENCOUNTER — TELEPHONE (OUTPATIENT)
Dept: CARDIOLOGY | Facility: CLINIC | Age: 84
End: 2024-12-10
Payer: MEDICARE

## 2024-12-10 NOTE — TELEPHONE ENCOUNTER
Incoming Refill Request      [facility]    [email or web address]    Additional details provided by patient:     Best call back number: [phone]    Does the patient have less than a 3 day supply:  [x] Yes  [] No    Shawna Daniel Rep  12/10/24, 10:16 EST

## 2024-12-10 NOTE — TELEPHONE ENCOUNTER
Rx Refill Note  Requested Prescriptions     Pending Prescriptions Disp Refills    rivaroxaban (Xarelto) 20 MG tablet 90 tablet 3     Sig: Take 1 tablet by mouth Daily.      Last office visit with prescribing clinician: 5/16/2024   Last telemedicine visit with prescribing clinician: Visit date not found   Next office visit with prescribing clinician: 5/19/2025                         Would you like a call back once the refill request has been completed: [] Yes [] No    If the office needs to give you a call back, can they leave a voicemail: [] Yes [] No    Richelle Ray MA  12/10/24, 10:59 EST

## 2024-12-17 ENCOUNTER — APPOINTMENT (OUTPATIENT)
Dept: CT IMAGING | Facility: HOSPITAL | Age: 84
End: 2024-12-17
Payer: MEDICARE

## 2024-12-17 ENCOUNTER — OFFICE VISIT (OUTPATIENT)
Dept: CARDIOLOGY | Facility: CLINIC | Age: 84
End: 2024-12-17
Payer: MEDICARE

## 2024-12-17 ENCOUNTER — HOSPITAL ENCOUNTER (EMERGENCY)
Facility: HOSPITAL | Age: 84
Discharge: HOME | End: 2024-12-17
Attending: EMERGENCY MEDICINE | Admitting: EMERGENCY MEDICINE
Payer: MEDICARE

## 2024-12-17 VITALS
BODY MASS INDEX: 38.09 KG/M2 | SYSTOLIC BLOOD PRESSURE: 179 MMHG | DIASTOLIC BLOOD PRESSURE: 90 MMHG | OXYGEN SATURATION: 98 % | HEART RATE: 81 BPM | HEIGHT: 66 IN | WEIGHT: 237 LBS

## 2024-12-17 VITALS
BODY MASS INDEX: 38.09 KG/M2 | RESPIRATION RATE: 16 BRPM | WEIGHT: 237 LBS | HEIGHT: 66 IN | HEART RATE: 63 BPM | TEMPERATURE: 98 F | DIASTOLIC BLOOD PRESSURE: 78 MMHG | OXYGEN SATURATION: 100 % | SYSTOLIC BLOOD PRESSURE: 180 MMHG

## 2024-12-17 DIAGNOSIS — I10 ESSENTIAL HYPERTENSION: Primary | ICD-10-CM

## 2024-12-17 DIAGNOSIS — E78.00 PURE HYPERCHOLESTEROLEMIA: ICD-10-CM

## 2024-12-17 DIAGNOSIS — N12 PYELONEPHRITIS: Primary | ICD-10-CM

## 2024-12-17 DIAGNOSIS — R00.1 BRADYCARDIA, DRUG INDUCED: ICD-10-CM

## 2024-12-17 DIAGNOSIS — I48.20 CHRONIC ATRIAL FIBRILLATION: ICD-10-CM

## 2024-12-17 DIAGNOSIS — I25.10 CORONARY ARTERY DISEASE INVOLVING NATIVE CORONARY ARTERY OF NATIVE HEART WITHOUT ANGINA PECTORIS: ICD-10-CM

## 2024-12-17 DIAGNOSIS — R10.9 LEFT SIDED ABDOMINAL PAIN: ICD-10-CM

## 2024-12-17 DIAGNOSIS — G47.33 OBSTRUCTIVE SLEEP APNEA SYNDROME: ICD-10-CM

## 2024-12-17 DIAGNOSIS — T50.905A BRADYCARDIA, DRUG INDUCED: ICD-10-CM

## 2024-12-17 LAB
ALBUMIN SERPL-MCNC: 4.2 G/DL (ref 3.5–5.2)
ALBUMIN/GLOB SERPL: 1.5 G/DL
ALP SERPL-CCNC: 85 U/L (ref 39–117)
ALT SERPL W P-5'-P-CCNC: 20 U/L (ref 1–33)
ANION GAP SERPL CALCULATED.3IONS-SCNC: 9.3 MMOL/L (ref 5–15)
AST SERPL-CCNC: 37 U/L (ref 1–32)
BASOPHILS # BLD AUTO: 0.01 10*3/MM3 (ref 0–0.2)
BASOPHILS NFR BLD AUTO: 0.1 % (ref 0–1.5)
BILIRUB SERPL-MCNC: 1.2 MG/DL (ref 0–1.2)
BILIRUB UR QL STRIP: NEGATIVE
BUN SERPL-MCNC: 22 MG/DL (ref 8–23)
BUN/CREAT SERPL: 27.8 (ref 7–25)
CALCIUM SPEC-SCNC: 9.5 MG/DL (ref 8.6–10.5)
CHLORIDE SERPL-SCNC: 96 MMOL/L (ref 98–107)
CLARITY UR: CLEAR
CO2 SERPL-SCNC: 29.7 MMOL/L (ref 22–29)
COLOR UR: ABNORMAL
CREAT SERPL-MCNC: 0.79 MG/DL (ref 0.57–1)
D-LACTATE SERPL-SCNC: 1.3 MMOL/L (ref 0.5–2)
DEPRECATED RDW RBC AUTO: 55.1 FL (ref 37–54)
EGFRCR SERPLBLD CKD-EPI 2021: 74.3 ML/MIN/1.73
EOSINOPHIL # BLD AUTO: 0.1 10*3/MM3 (ref 0–0.4)
EOSINOPHIL NFR BLD AUTO: 0.9 % (ref 0.3–6.2)
ERYTHROCYTE [DISTWIDTH] IN BLOOD BY AUTOMATED COUNT: 15 % (ref 12.3–15.4)
GLOBULIN UR ELPH-MCNC: 2.8 GM/DL
GLUCOSE SERPL-MCNC: 114 MG/DL (ref 65–99)
GLUCOSE UR STRIP-MCNC: NEGATIVE MG/DL
HCT VFR BLD AUTO: 35.2 % (ref 34–46.6)
HGB BLD-MCNC: 11.2 G/DL (ref 12–15.9)
HGB UR QL STRIP.AUTO: ABNORMAL
IMM GRANULOCYTES # BLD AUTO: 0.02 10*3/MM3 (ref 0–0.05)
IMM GRANULOCYTES NFR BLD AUTO: 0.2 % (ref 0–0.5)
KETONES UR QL STRIP: NEGATIVE
LEUKOCYTE ESTERASE UR QL STRIP.AUTO: NEGATIVE
LIPASE SERPL-CCNC: 18 U/L (ref 13–60)
LYMPHOCYTES # BLD AUTO: 1.2 10*3/MM3 (ref 0.7–3.1)
LYMPHOCYTES NFR BLD AUTO: 11.2 % (ref 19.6–45.3)
MCH RBC QN AUTO: 31.5 PG (ref 26.6–33)
MCHC RBC AUTO-ENTMCNC: 31.8 G/DL (ref 31.5–35.7)
MCV RBC AUTO: 99.2 FL (ref 79–97)
MONOCYTES # BLD AUTO: 0.93 10*3/MM3 (ref 0.1–0.9)
MONOCYTES NFR BLD AUTO: 8.7 % (ref 5–12)
NEUTROPHILS NFR BLD AUTO: 78.9 % (ref 42.7–76)
NEUTROPHILS NFR BLD AUTO: 8.44 10*3/MM3 (ref 1.7–7)
NITRITE UR QL STRIP: NEGATIVE
PH UR STRIP.AUTO: 6.5 [PH] (ref 5–8)
PLATELET # BLD AUTO: 199 10*3/MM3 (ref 140–450)
PMV BLD AUTO: 9.1 FL (ref 6–12)
POTASSIUM SERPL-SCNC: 3.9 MMOL/L (ref 3.5–5.2)
PROT SERPL-MCNC: 7 G/DL (ref 6–8.5)
PROT UR QL STRIP: NEGATIVE
RBC # BLD AUTO: 3.55 10*6/MM3 (ref 3.77–5.28)
SODIUM SERPL-SCNC: 135 MMOL/L (ref 136–145)
SP GR UR STRIP: 1.01 (ref 1–1.03)
UROBILINOGEN UR QL STRIP: ABNORMAL
WBC NRBC COR # BLD AUTO: 10.7 10*3/MM3 (ref 3.4–10.8)

## 2024-12-17 PROCEDURE — 25510000001 IOPAMIDOL PER 1 ML: Performed by: EMERGENCY MEDICINE

## 2024-12-17 PROCEDURE — 83605 ASSAY OF LACTIC ACID: CPT | Performed by: EMERGENCY MEDICINE

## 2024-12-17 PROCEDURE — 80053 COMPREHEN METABOLIC PANEL: CPT | Performed by: EMERGENCY MEDICINE

## 2024-12-17 PROCEDURE — 25010000002 CEFTRIAXONE PER 250 MG: Performed by: EMERGENCY MEDICINE

## 2024-12-17 PROCEDURE — 25010000002 ONDANSETRON PER 1 MG: Performed by: EMERGENCY MEDICINE

## 2024-12-17 PROCEDURE — 83690 ASSAY OF LIPASE: CPT | Performed by: EMERGENCY MEDICINE

## 2024-12-17 PROCEDURE — 74177 CT ABD & PELVIS W/CONTRAST: CPT

## 2024-12-17 PROCEDURE — 96361 HYDRATE IV INFUSION ADD-ON: CPT

## 2024-12-17 PROCEDURE — 25010000002 MORPHINE PER 10 MG: Performed by: EMERGENCY MEDICINE

## 2024-12-17 PROCEDURE — 96365 THER/PROPH/DIAG IV INF INIT: CPT

## 2024-12-17 PROCEDURE — 99285 EMERGENCY DEPT VISIT HI MDM: CPT

## 2024-12-17 PROCEDURE — 81003 URINALYSIS AUTO W/O SCOPE: CPT | Performed by: EMERGENCY MEDICINE

## 2024-12-17 PROCEDURE — 85025 COMPLETE CBC W/AUTO DIFF WBC: CPT | Performed by: EMERGENCY MEDICINE

## 2024-12-17 PROCEDURE — 96375 TX/PRO/DX INJ NEW DRUG ADDON: CPT

## 2024-12-17 PROCEDURE — 25810000003 SODIUM CHLORIDE 0.9 % SOLUTION: Performed by: EMERGENCY MEDICINE

## 2024-12-17 RX ORDER — ONDANSETRON 2 MG/ML
4 INJECTION INTRAMUSCULAR; INTRAVENOUS ONCE
Status: COMPLETED | OUTPATIENT
Start: 2024-12-17 | End: 2024-12-17

## 2024-12-17 RX ORDER — AMLODIPINE BESYLATE 10 MG/1
10 TABLET ORAL DAILY
Qty: 90 TABLET | OUTPATIENT
Start: 2024-12-17

## 2024-12-17 RX ORDER — IOPAMIDOL 755 MG/ML
100 INJECTION, SOLUTION INTRAVASCULAR
Status: COMPLETED | OUTPATIENT
Start: 2024-12-17 | End: 2024-12-17

## 2024-12-17 RX ORDER — CEPHALEXIN 500 MG/1
500 CAPSULE ORAL 3 TIMES DAILY
Qty: 21 CAPSULE | Refills: 0 | Status: SHIPPED | OUTPATIENT
Start: 2024-12-17 | End: 2024-12-24

## 2024-12-17 RX ORDER — TRAMADOL HYDROCHLORIDE 100 MG/1
100 TABLET, COATED ORAL EVERY 6 HOURS PRN
COMMUNITY

## 2024-12-17 RX ORDER — AMLODIPINE BESYLATE 10 MG/1
10 TABLET ORAL DAILY
Qty: 30 TABLET | Refills: 0 | Status: SHIPPED | OUTPATIENT
Start: 2024-12-17

## 2024-12-17 RX ADMIN — MORPHINE SULFATE 4 MG: 4 INJECTION, SOLUTION INTRAMUSCULAR; INTRAVENOUS at 18:06

## 2024-12-17 RX ADMIN — ONDANSETRON 4 MG: 2 INJECTION INTRAMUSCULAR; INTRAVENOUS at 18:05

## 2024-12-17 RX ADMIN — CEFTRIAXONE SODIUM 1000 MG: 1 INJECTION, POWDER, FOR SOLUTION INTRAMUSCULAR; INTRAVENOUS at 19:42

## 2024-12-17 RX ADMIN — IOPAMIDOL 100 ML: 755 INJECTION, SOLUTION INTRAVENOUS at 18:56

## 2024-12-17 RX ADMIN — SODIUM CHLORIDE 1000 ML: 9 INJECTION, SOLUTION INTRAVENOUS at 18:04

## 2024-12-17 NOTE — TELEPHONE ENCOUNTER
Rx Refill Note  Requested Prescriptions     Refused Prescriptions Disp Refills    amLODIPine (NORVASC) 10 MG tablet [Pharmacy Med Name: AMLODIPINE BESYLATE 10MG TABLETS] 90 tablet      Sig: Take 1 tablet by mouth Daily.      Last office visit with prescribing clinician: 12/17/2024   Last telemedicine visit with prescribing clinician: Visit date not found   Next office visit with prescribing clinician: 5/19/2025                         Would you like a call back once the refill request has been completed: [] Yes [] No    If the office needs to give you a call back, can they leave a voicemail: [] Yes [] No    Emil Khalil MA  12/17/24, 15:30 EST

## 2024-12-17 NOTE — PROGRESS NOTES
Subjective:     Encounter Date:12/17/2024      Patient ID: Yasmeen Rahman is a 84 y.o. female.    Chief Complaint:  History of Present Illness 84-year-old white female with history of coronary artery disease chronic atrial fibrillation hypertension hyperlipidemia sleep apnea presents to my office for a follow-up.  Patient is currently stable without any symptoms of chest pain or shortness of breath at rest or exertion.  No complaints of any PND orthopnea.  No palpitations dizziness syncope or swelling of the feet.  Patient is taking the medicines regularly.  She is due for back surgery and did not have it done because of her high blood pressure and low heart rate.    The following portions of the patient's history were reviewed and updated as appropriate: allergies, current medications, past family history, past medical history, past social history, past surgical history, and problem list.  Past Medical History:   Diagnosis Date    Anemia 2018    Anxiety 2018    Arthritis     Atrial fibrillation     CAD (coronary artery disease) 2013    CKD (chronic kidney disease) 2019    Colon cancer 09/2019    right hemicolectomy    GERD (gastroesophageal reflux disease) 2009    Gout 2009    High cholesterol 1989    Hypertension 1989    Hypothyroidism 2004    Lupus     PHOENIX (obstructive sleep apnea)     Osteopenia 2015    Osteoporosis 2004    PONV (postoperative nausea and vomiting)     Rheumatoid arthritis 2009     Past Surgical History:   Procedure Laterality Date    ABLATION OF DYSRHYTHMIC FOCUS  9/1/22    Left knee    BLADDER SURGERY  11/2019    tumor removed    CARDIAC CATHETERIZATION      no stents    CHOLECYSTECTOMY  1990    laparoscopic     COLON RESECTION Right 09/05/2019    Procedure: Open right hemicolectomy;  Surgeon: Dominick Cunha DO;  Location: [facility] OR;  Service: General    COLONOSCOPY      ENDOSCOPY  12/2020    ESOPHAGEAL DILATATION      HEEL SPUR EXCISION Right 1994    HYSTERECTOMY  1994     "total due to heavy beeding and fibroids    JOINT REPLACEMENT      TONSILLECTOMY  1962    TOTAL HIP ARTHROPLASTY Right 2010    VEIN SURGERY  2014    Tied off veins to avoid blood clots in legs with Dr. Fregoso     /90   Pulse 81   Ht 167.6 cm (66\")   Wt 108 kg (237 lb)   LMP  (LMP Unknown)   SpO2 98%   BMI 38.25 kg/m²   Family History   Problem Relation Age of Onset    Colon cancer Brother 71    Heart attack Brother     Hypertension Brother     Hypertension Sister         Stroke -dementia  diabetes       Current Outpatient Medications:     allopurinol (ZYLOPRIM) 100 MG tablet, Take 1 tablet by mouth Daily., Disp: , Rfl:     atenolol (TENORMIN) 25 MG tablet, Take 1 tablet by mouth Daily., Disp: , Rfl:     bumetanide (BUMEX) 1 MG tablet, Take 2 tablets by mouth Daily., Disp: , Rfl:     Calcium Carbonate-Vitamin D 600-200 MG-UNIT tablet, Take 1,200 mg by mouth Daily., Disp: , Rfl:     cetirizine (zyrTEC) 10 MG tablet, Take 1 tablet by mouth Daily., Disp: , Rfl:     Cholecalciferol (VITAMIN D3) 2000 units capsule, Take 1 capsule by mouth every night at bedtime., Disp: , Rfl:     CloNIDine (CATAPRES) 0.1 MG tablet, 1 tablet TID, Disp: , Rfl:     clotrimazole-betamethasone (LOTRISONE) 1-0.05 % cream, Apply 1 Application topically to the appropriate area as directed 2 (Two) Times a Day. To begin after surgical dressing is removed on 5/30/22, Disp: , Rfl:     colchicine 0.6 MG tablet, Take 1 tablet by mouth 2 (Two) Times a Day As Needed for Muscle / Joint Pain (Gouty flareups)., Disp: , Rfl:     colesevelam (WELCHOL) 625 MG tablet, Take 3 tablets by mouth 2 (Two) Times a Day With Meals., Disp: , Rfl:     escitalopram (LEXAPRO) 10 MG tablet, Take 1 tablet by mouth Daily., Disp: , Rfl:     irbesartan (AVAPRO) 300 MG tablet, Take 1 tablet by mouth Daily., Disp: , Rfl:     irbesartan (AVAPRO) 300 MG tablet, Take 1 tablet by mouth Daily., Disp: , Rfl:     levothyroxine (SYNTHROID, LEVOTHROID) 100 MCG tablet, Take 1 " tablet by mouth Daily., Disp: , Rfl:     Melatonin 10 MG tablet, Take 1 tablet by mouth Daily., Disp: , Rfl:     pantoprazole (PROTONIX) 40 MG EC tablet, Take 1 tablet by mouth Daily., Disp: , Rfl:     potassium chloride (K-DUR) 10 MEQ CR tablet, Take 1 tablet by mouth Daily., Disp: , Rfl:     predniSONE (DELTASONE) 10 MG tablet, Take 0.5 tablets by mouth Daily As Needed., Disp: , Rfl:     Probiotic Product (PROBIOTIC ADVANCED PO), Take 1 tablet by mouth Daily., Disp: , Rfl:     rivaroxaban (Xarelto) 20 MG tablet, Take 1 tablet by mouth Daily., Disp: 90 tablet, Rfl: 3    rosuvastatin (CRESTOR) 10 MG tablet, Take 1 tablet by mouth Daily., Disp: , Rfl:     tiZANidine (ZANAFLEX) 4 MG tablet, Take 1 tablet by mouth At Night As Needed for Muscle Spasms., Disp: , Rfl:     traMADol HCl (ULTRAM) 100 MG tablet, Take 1 tablet by mouth Every 6 (Six) Hours As Needed., Disp: , Rfl:     traZODone (DESYREL) 50 MG tablet, Take 0.5 tablets by mouth Daily As Needed. insomnia, Disp: , Rfl:     Vibegron (GEMTESA PO), Take 75 mg by mouth Daily., Disp: , Rfl:     vitamin B-12 (CYANOCOBALAMIN) 1000 MCG tablet, Take 1 tablet by mouth Daily., Disp: , Rfl:     vitamin C (ASCORBIC ACID) 500 MG tablet, Take 1 tablet by mouth Daily., Disp: , Rfl:     pravastatin (PRAVACHOL) 40 MG tablet, Take 1 tablet by mouth Every Night. (Patient not taking: Reported on 12/17/2024), Disp: , Rfl:   Allergies   Allergen Reactions    Codeine Nausea And Vomiting    Hydrocodone-Acetaminophen Nausea And Vomiting     Nausea,vomiting    Penicillins Unknown - Low Severity     Social History     Socioeconomic History    Marital status:     Number of children: 4   Tobacco Use    Smoking status: Never     Passive exposure: Never    Smokeless tobacco: Never   Vaping Use    Vaping status: Never Used   Substance and Sexual Activity    Alcohol use: No    Drug use: No    Sexual activity: Defer     Review of Systems   Constitutional: Positive for malaise/fatigue.    Cardiovascular:  Negative for chest pain, dyspnea on exertion, leg swelling and palpitations.   Respiratory:  Negative for cough and shortness of breath.    Gastrointestinal:  Negative for abdominal pain, nausea and vomiting.   Neurological:  Negative for dizziness, focal weakness, headaches, light-headedness and numbness.   All other systems reviewed and are negative.             Objective:     Constitutional:       Appearance: Well-developed.   Eyes:      General: No scleral icterus.     Conjunctiva/sclera: Conjunctivae normal.   HENT:      Head: Normocephalic and atraumatic.   Neck:      Vascular: No carotid bruit or JVD.   Pulmonary:      Effort: Pulmonary effort is normal.      Breath sounds: Normal breath sounds. No wheezing. No rales.   Cardiovascular:      Normal rate. Regular rhythm.   Pulses:     Intact distal pulses.   Abdominal:      General: Bowel sounds are normal.      Palpations: Abdomen is soft.   Musculoskeletal:      Cervical back: Normal range of motion and neck supple. Skin:     General: Skin is warm and dry.      Findings: No rash.   Neurological:      Mental Status: Alert.       Procedures    Lab Review:         MDM    #1 coronary disease  Patient has nonobstructive disease in the past and is currently stable without any angina  2.  Bradycardia  Patient was found to be bradycardic and hence she had her surgery canceled and I will stop her clonidine and continue atenolol but place a 7-day Holter monitor.  3.  Hypertension  Patient blood pressure very high and I will start her on amlodipine along with irbesartan and atenolol but stop the clonidine  4.  Hyperlipidemia  Patient is on statins and the lipid levels are followed by the primary care doctor.  5.  Atrial fibrillation  Patient has history of atrial fibrillation is currently on Xarelto for anticoagulation  6.  Sleep apnea  Patient has sleep apnea and uses a CPAP machine    Patient's previous medical records, labs, and EKG were reviewed  and discussed with the patient at today's visit.

## 2024-12-18 NOTE — FSED PROVIDER NOTE
Subjective   History of Present Illness  Patient is an 84-year-old  female presents emergency room with complaints of abdominal pain.  Patient reports left-sided abdominal pain that has been bothering her for the past 2 days.  Patient states that she was nothing to eat or drink 2 nights ago because she was supposed to have a laminectomy yesterday.  Patient states that the surgery was canceled because she was having episodes of bradycardia.  She reports that last night she developed left-sided abdominal pain could not sleep.  Patient reports that it continued throughout the day today.  She has had nausea with vomiting as well.  Denies any chest pain or shortness of breath.  She is here for an evaluation.        Review of Systems   Constitutional: Negative.  Negative for chills, fatigue and fever.   Eyes: Negative.    Respiratory:  Negative for cough, chest tightness and shortness of breath.    Cardiovascular:  Negative for chest pain and palpitations.   Gastrointestinal:  Positive for abdominal pain, nausea and vomiting. Negative for diarrhea.   Genitourinary: Negative.  Positive for flank pain.   Musculoskeletal: Negative.    Skin: Negative.  Negative for rash.   Neurological: Negative.  Negative for syncope, weakness, numbness and headaches.   Psychiatric/Behavioral: Negative.     All other systems reviewed and are negative.      Past Medical History:   Diagnosis Date    Anemia 2018    Anxiety 2018    Arthritis     Atrial fibrillation     CAD (coronary artery disease) 2013    CKD (chronic kidney disease) 2019    Colon cancer 09/2019    right hemicolectomy    GERD (gastroesophageal reflux disease) 2009    Gout 2009    High cholesterol 1989    Hypertension 1989    Hypothyroidism 2004    Lupus     PHOENIX (obstructive sleep apnea)     Osteopenia 2015    Osteoporosis 2004    PONV (postoperative nausea and vomiting)     Rheumatoid arthritis 2009       Allergies   Allergen Reactions    Codeine Nausea And Vomiting     Hydrocodone-Acetaminophen Nausea And Vomiting     Nausea,vomiting    Penicillins Unknown - Low Severity       Past Surgical History:   Procedure Laterality Date    ABLATION OF DYSRHYTHMIC FOCUS  9/1/22    Left knee    BLADDER SURGERY  11/2019    tumor removed    CARDIAC CATHETERIZATION      no stents    CHOLECYSTECTOMY  1990    laparoscopic     COLON RESECTION Right 09/05/2019    Procedure: Open right hemicolectomy;  Surgeon: Dominick Cunha DO;  Location: [facility] MAIN OR;  Service: General    COLONOSCOPY      ENDOSCOPY  12/2020    ESOPHAGEAL DILATATION      HEEL SPUR EXCISION Right 1994    HYSTERECTOMY  1994    total due to heavy beeding and fibroids    JOINT REPLACEMENT      TONSILLECTOMY  1962    TOTAL HIP ARTHROPLASTY Right 2010    VEIN SURGERY  2014    Tied off veins to avoid blood clots in legs with Dr. Fregoso       Family History   Problem Relation Age of Onset    Colon cancer Brother 71    Heart attack Brother     Hypertension Brother     Hypertension Sister         Stroke -dementia  diabetes       Social History     Socioeconomic History    Marital status:     Number of children: 4   Tobacco Use    Smoking status: Never     Passive exposure: Never    Smokeless tobacco: Never   Vaping Use    Vaping status: Never Used   Substance and Sexual Activity    Alcohol use: No    Drug use: No    Sexual activity: Defer           Objective   Physical Exam  Vitals and nursing note reviewed.   Constitutional:       General: She is not in acute distress.     Appearance: Normal appearance. She is normal weight.   HENT:      Right Ear: External ear normal.      Left Ear: External ear normal.      Nose: Nose normal.   Cardiovascular:      Rate and Rhythm: Normal rate.   Pulmonary:      Effort: Pulmonary effort is normal.   Abdominal:      Tenderness: There is abdominal tenderness in the left lower quadrant. There is left CVA tenderness. There is no guarding or rebound.   Musculoskeletal:         General: Normal range of  motion.      Cervical back: Normal range of motion.   Skin:     Capillary Refill: Capillary refill takes less than 2 seconds.   Neurological:      General: No focal deficit present.      Mental Status: She is alert and oriented to person, place, and time.   Psychiatric:         Mood and Affect: Mood normal.         Procedures           ED Course  ED Course as of 12/18/24 0023   Tue Dec 17, 2024   1758 CBC is normal. [KZ]   1815 CMP, lipase, lactic do not show anything worrisome. [KZ]   1836 Awaiting CT scan results. [KZ]   1920 CT scan shows left pyelonephritis.  Patient's urinalysis is not really impressive for UTI.  There could possibly be obstruction causing issues.  Patient is offered admission to the hospital but declines.  She will be placed on antibiotics and given follow-up with her primary care physician. [KZ]      ED Course User Index  [KZ] Oswaldo Mckeon MD                                           Medical Decision Making  The patient is a female who presents emergency room with lower abdominal pain.  See HPI for exact description of pain and associated symptoms.  Given her presentation, a work-up has been ordered in the emergency department which includes lab work and imaging.  We have also requested urinalysis.  Differential diagnosis in this case would include gastroenteritis, acute appendicitis, mesenteric adenitis, epiploic appendagitis, diverticulitis, colitis, malignancy of the GI/genitourinary tract, inflammatory bowel disease (ulcerative colitis, Crohn's disease), urinary tract infection, kidney stone.  Small bowel obstruction, ischemic bowel, perforated viscus also considered.  Specific female issues considered would be ovarian torsion, tubo-ovarian abscess, and/or STDs-the lack of genitourinary symptoms makes this unlikely.  we will continue to follow-up testing results.      Problems Addressed:  Left sided abdominal pain: complicated acute illness or injury  Pyelonephritis: complicated  acute illness or injury    Amount and/or Complexity of Data Reviewed  Labs: ordered.  Radiology: ordered.    Risk  Prescription drug management.        Final diagnoses:   Pyelonephritis   Left sided abdominal pain       ED Disposition  ED Disposition       ED Disposition   Discharge    Condition   Stable    Comment   --               Alex Rodriguez MD  [address] [place] [address] [place] [address] [phone]    Schedule an appointment as soon as possible for a visit in 2 days  For repeat evaluation         Medication List        New Prescriptions      cephalexin 500 MG capsule  Commonly known as: KEFLEX  Take 1 capsule by mouth 3 (Three) Times a Day for 7 days.               Where to Get Your Medications        These medications were sent to [id number] - [place], [place] - [address] - [phone] PH - [phone] FX  [address] [place] [address]      Phone: [phone]   cephalexin 500 MG capsule

## 2024-12-19 NOTE — TELEPHONE ENCOUNTER
Rx Refill Note  Requested Prescriptions     Pending Prescriptions Disp Refills    rivaroxaban (Xarelto) 20 MG tablet 30 tablet 0     Sig: Take 1 tablet by mouth Daily.      Last office visit with prescribing clinician: 12/17/2024   Last telemedicine visit with prescribing clinician: Visit date not found   Next office visit with prescribing clinician: 5/19/2025                         Would you like a call back once the refill request has been completed: [] Yes [] No    If the office needs to give you a call back, can they leave a voicemail: [] Yes [] No    Emil Khalil MA  12/19/24, 16:47 EST

## 2024-12-19 NOTE — TELEPHONE ENCOUNTER
Patient called, she stated [facility] has [facility] her medications but they are at the post office and the post office stated that she will not receive medication for up to 14 days.    Sent 30 day RX to local pharmacy.

## 2025-01-02 ENCOUNTER — TELEPHONE (OUTPATIENT)
Dept: CARDIOLOGY | Facility: CLINIC | Age: 85
End: 2025-01-02
Payer: MEDICARE

## 2025-01-02 RX ORDER — AMLODIPINE BESYLATE 10 MG/1
10 TABLET ORAL DAILY
Qty: 90 TABLET | Refills: 1 | Status: SHIPPED | OUTPATIENT
Start: 2025-01-02

## 2025-01-02 NOTE — TELEPHONE ENCOUNTER
Pt's daughter called and is asking if we have the heart monitor results, can we call them with results so she can get approved for her back surgery.

## 2025-01-02 NOTE — TELEPHONE ENCOUNTER
Incoming Refill Request      Medication requested (name and dose): Amlodipine 10 mg     Pharmacy where request should be sent: [facility]     Additional details provided by patient: Requesting 90 day supply     Best call back number: [phone] ([place])    Does the patient have less than a 3 day supply:  [x] Yes  [] No

## 2025-01-02 NOTE — TELEPHONE ENCOUNTER
Patient has atrial fibrillation for which she takes Xarelto.  She is also on atenolol which have decreased to 12.5 mg and she can have surgery from cardiac status with low risk.

## 2025-01-02 NOTE — TELEPHONE ENCOUNTER
Called patient and spoke to her and Daughter (Orly) ON HIPAA, verbally explained that her holter monitor results came back and she does have AFIB which is why she is taking the xarelto and the atenolol which was decreased but she is cleared from a cardiac standpoint with low risk. I also verbally explained they will need to call [facility] to have them fax over a new cardiac clearance for her surgery to be rescheduled with the surgeon.    They verbally understood and will contact [facility] for the cardiac clearance.

## 2025-01-07 ENCOUNTER — TELEPHONE (OUTPATIENT)
Dept: CARDIOLOGY | Facility: CLINIC | Age: 85
End: 2025-01-07
Payer: MEDICARE

## 2025-01-07 NOTE — TELEPHONE ENCOUNTER
The [facility] received a fax that requires your attention. The document has been indexed to the patient’s chart for your review.      Reason for sending: EXTERNAL MEDICAL RECORD NOTIFICATION     Documents Description: CARDIAC CLEARANCE REQ-[facility]-1.7.25    Name of Sender: [facility]     Date Indexed: 1.7.25

## 2025-01-07 NOTE — TELEPHONE ENCOUNTER
FACILITY: [facility]  DR:   PHONE:   FAX: [phone]  PROCEDURE: Laminectomy L3-4, L4-5 With Discectomy  SCHEDULED: 02/05/25  MEDS TO HOLD: xarelto

## 2025-01-08 ENCOUNTER — OFFICE (AMBULATORY)
Dept: URBAN - METROPOLITAN AREA CLINIC 64 | Facility: CLINIC | Age: 85
End: 2025-01-08
Payer: MEDICARE

## 2025-01-08 VITALS
SYSTOLIC BLOOD PRESSURE: 140 MMHG | HEART RATE: 74 BPM | HEIGHT: 66 IN | DIASTOLIC BLOOD PRESSURE: 67 MMHG | WEIGHT: 241 LBS

## 2025-01-08 DIAGNOSIS — R19.4 CHANGE IN BOWEL HABIT: ICD-10-CM

## 2025-01-08 DIAGNOSIS — R14.0 ABDOMINAL DISTENSION (GASEOUS): ICD-10-CM

## 2025-01-08 PROCEDURE — 99213 OFFICE O/P EST LOW 20 MIN: CPT | Performed by: NURSE PRACTITIONER

## 2025-01-08 RX ORDER — METRONIDAZOLE 500 MG/1
1500 TABLET, FILM COATED ORAL
Qty: 42 | Refills: 0 | Status: ACTIVE
Start: 2025-01-08

## 2025-01-13 ENCOUNTER — TELEPHONE (OUTPATIENT)
Dept: CARDIOLOGY | Facility: CLINIC | Age: 85
End: 2025-01-13
Payer: MEDICARE

## 2025-01-13 NOTE — TELEPHONE ENCOUNTER
Called patient and spoke with her and her daughter (Orly ON HIPAA) and verbally expressed we are going to have her stop the amlodipine and then call us with the readings in 5 -7 days and to also monitor her symptoms.    They verbally understood and had no further questions or concerns at this time.

## 2025-01-13 NOTE — TELEPHONE ENCOUNTER
Please have patient hold the amlodipine and see if symptoms improve  Please have her monitor blood pressure and call us with 1 weeks of readings to see if symptoms have improved as well as if we will need to restart another medication based on BP readings   As far as redness and soreness of lower extremities she could potentially have cellulitis and should be evaluated by her PCP.

## 2025-01-20 ENCOUNTER — TELEPHONE (OUTPATIENT)
Dept: CARDIOLOGY | Facility: CLINIC | Age: 85
End: 2025-01-20
Payer: MEDICARE

## 2025-01-20 RX ORDER — ATENOLOL 25 MG/1
25 TABLET ORAL DAILY PRN
COMMUNITY

## 2025-01-20 NOTE — TELEPHONE ENCOUNTER
Clinic Care Coordination Contact  Program:   [facility]: [place]    Renewal:UCARE   Date Applied:      FRW Outreach:   8/19/24: 2nd outreach attempt. Left message on voicemail indicating last outreach attempt. CTA left [facility] number for renewal follow up.  Plan: CTA will no longer make outreach  Fiordaliza Vargas   JOSE RAMON [facility]  Clinic Care Coordination  [phone]    8/5/24: 1st outreach attempt. Left a message on voicemail with call back information and requested return call.  Plan: CTA will call again within 2 weeks.  Fiordaliza Vargas   M [facility]  Clinic Care Coordination  [phone]      Health Insurance:        Referral/Screening:   Yes, from a cardiology standpoint those blood pressures look well-controlled enough for surgery.  Please have patient hold atenolol if heart rate less than 55 bpm.    If she is no longer on amlodipine and blood pressure remains greater than 140/90 consistently please have her call and we can increase irbesartan to 300 mg instead of 150.

## 2025-01-20 NOTE — TELEPHONE ENCOUNTER
Pt was put on Hydrochlorothiazide, and had her Amlodipine decreased and her BP is running top # 141 to 164 and the bottom # is from 70 to 81. Her heart rate is running 40-59 and it was in the 60's twice. Pt states BP was in the 180's and she could not have surgery and is asking if these #'s will be good enough for her to have surgery.

## 2025-01-20 NOTE — TELEPHONE ENCOUNTER
Called patient and spoke with the daughter Orly (ON HPAA) and verbally expressed that per Casi GIBSON states that the BP readings are well controlled from a cardiac standpoint for surgery. With the HR the patient would need to hold the atenolol 25 mg daily if the patients HR is less than 55 bpm.    She verbally understood and will monitor BP and HR for the next 7 days and call us with the readings to determine further medication adjustments for her HR and for the Irbesartan for her BP incase we will need to go back to 300 mg daily if BP is consistently greater than 140/90.    She verbally understood and had no further questions and will call us back in 7 days with the readings.    UPDATED MEDICATION LIST.  (ATENOLOL 25 MG PRN)  -TAKE WHEN HR IS ABOVE 55 BPM-

## 2025-01-20 NOTE — TELEPHONE ENCOUNTER
Called patient and spoke with her and Orly (ON HIPAA)    BP & HR readings: Starting 1/13/25    136/68  70    125/74  53    139/74  63    141/71  60    151/77  56    164/81  62    153/79  49    141/74  67    145/74  48    -Pt stopped amlodipine-  (Edema has gotten better)    Pt is taking:  atenolol 12.5 mg daily  Irbesartan 150 mg daily

## 2025-01-24 ENCOUNTER — TRANSCRIBE ORDERS (OUTPATIENT)
Dept: ADMINISTRATIVE | Facility: HOSPITAL | Age: 85
End: 2025-01-24
Payer: MEDICARE

## 2025-01-24 ENCOUNTER — LAB (OUTPATIENT)
Dept: LAB | Facility: HOSPITAL | Age: 85
End: 2025-01-24
Payer: MEDICARE

## 2025-01-24 DIAGNOSIS — Z01.818 OTHER SPECIFIED PRE-OPERATIVE EXAMINATION: ICD-10-CM

## 2025-01-24 DIAGNOSIS — Z01.818 OTHER SPECIFIED PRE-OPERATIVE EXAMINATION: Primary | ICD-10-CM

## 2025-01-24 LAB
ANION GAP SERPL CALCULATED.3IONS-SCNC: 12.8 MMOL/L (ref 5–15)
BASOPHILS # BLD AUTO: 0.02 10*3/MM3 (ref 0–0.2)
BASOPHILS NFR BLD AUTO: 0.2 % (ref 0–1.5)
BUN SERPL-MCNC: 24 MG/DL (ref 8–23)
BUN/CREAT SERPL: 31.6 (ref 7–25)
CALCIUM SPEC-SCNC: 9.4 MG/DL (ref 8.6–10.5)
CHLORIDE SERPL-SCNC: 90 MMOL/L (ref 98–107)
CO2 SERPL-SCNC: 26.2 MMOL/L (ref 22–29)
CREAT SERPL-MCNC: 0.76 MG/DL (ref 0.57–1)
DEPRECATED RDW RBC AUTO: 49.3 FL (ref 37–54)
EGFRCR SERPLBLD CKD-EPI 2021: 77.9 ML/MIN/1.73
EOSINOPHIL # BLD AUTO: 0.12 10*3/MM3 (ref 0–0.4)
EOSINOPHIL NFR BLD AUTO: 1.5 % (ref 0.3–6.2)
ERYTHROCYTE [DISTWIDTH] IN BLOOD BY AUTOMATED COUNT: 14.1 % (ref 12.3–15.4)
GLUCOSE SERPL-MCNC: 123 MG/DL (ref 65–99)
HCT VFR BLD AUTO: 36.8 % (ref 34–46.6)
HGB BLD-MCNC: 11.9 G/DL (ref 12–15.9)
IMM GRANULOCYTES # BLD AUTO: 0.03 10*3/MM3 (ref 0–0.05)
IMM GRANULOCYTES NFR BLD AUTO: 0.4 % (ref 0–0.5)
LYMPHOCYTES # BLD AUTO: 1.4 10*3/MM3 (ref 0.7–3.1)
LYMPHOCYTES NFR BLD AUTO: 17.5 % (ref 19.6–45.3)
MCH RBC QN AUTO: 30.8 PG (ref 26.6–33)
MCHC RBC AUTO-ENTMCNC: 32.3 G/DL (ref 31.5–35.7)
MCV RBC AUTO: 95.3 FL (ref 79–97)
MONOCYTES # BLD AUTO: 0.68 10*3/MM3 (ref 0.1–0.9)
MONOCYTES NFR BLD AUTO: 8.5 % (ref 5–12)
NEUTROPHILS NFR BLD AUTO: 5.77 10*3/MM3 (ref 1.7–7)
NEUTROPHILS NFR BLD AUTO: 71.9 % (ref 42.7–76)
NRBC BLD AUTO-RTO: 0 /100 WBC (ref 0–0.2)
PLATELET # BLD AUTO: 253 10*3/MM3 (ref 140–450)
PMV BLD AUTO: 9.3 FL (ref 6–12)
POTASSIUM SERPL-SCNC: 3.6 MMOL/L (ref 3.5–5.2)
RBC # BLD AUTO: 3.86 10*6/MM3 (ref 3.77–5.28)
SODIUM SERPL-SCNC: 129 MMOL/L (ref 136–145)
WBC NRBC COR # BLD AUTO: 8.02 10*3/MM3 (ref 3.4–10.8)

## 2025-01-24 PROCEDURE — 36415 COLL VENOUS BLD VENIPUNCTURE: CPT

## 2025-01-24 PROCEDURE — 80048 BASIC METABOLIC PNL TOTAL CA: CPT

## 2025-01-24 PROCEDURE — 85025 COMPLETE CBC W/AUTO DIFF WBC: CPT

## 2025-04-04 ENCOUNTER — OFFICE (AMBULATORY)
Dept: URBAN - METROPOLITAN AREA CLINIC 64 | Facility: CLINIC | Age: 85
End: 2025-04-04
Payer: MEDICARE

## 2025-04-04 ENCOUNTER — TELEPHONE (OUTPATIENT)
Dept: CARDIOLOGY | Facility: CLINIC | Age: 85
End: 2025-04-04
Payer: MEDICARE

## 2025-04-04 VITALS
SYSTOLIC BLOOD PRESSURE: 154 MMHG | HEART RATE: 72 BPM | DIASTOLIC BLOOD PRESSURE: 95 MMHG | DIASTOLIC BLOOD PRESSURE: 76 MMHG | HEIGHT: 66 IN | SYSTOLIC BLOOD PRESSURE: 146 MMHG | WEIGHT: 234 LBS

## 2025-04-04 DIAGNOSIS — R68.81 EARLY SATIETY: ICD-10-CM

## 2025-04-04 DIAGNOSIS — R10.32 LEFT LOWER QUADRANT PAIN: ICD-10-CM

## 2025-04-04 DIAGNOSIS — R11.0 NAUSEA: ICD-10-CM

## 2025-04-04 DIAGNOSIS — R19.7 DIARRHEA, UNSPECIFIED: ICD-10-CM

## 2025-04-04 DIAGNOSIS — R63.0 ANOREXIA: ICD-10-CM

## 2025-04-04 PROCEDURE — 99214 OFFICE O/P EST MOD 30 MIN: CPT | Performed by: NURSE PRACTITIONER

## 2025-04-04 RX ORDER — COLESTIPOL HYDROCHLORIDE 1 G/1
1 TABLET, FILM COATED ORAL
Qty: 90 | Refills: 3 | Status: ACTIVE

## 2025-04-04 RX ORDER — PANTOPRAZOLE 40 MG/1
40 TABLET, DELAYED RELEASE ORAL
Qty: 90 | Refills: 3 | Status: ACTIVE

## 2025-04-08 NOTE — TELEPHONE ENCOUNTER
Called Giselle with [facility] and verbally expressed the  was written incorrect for the patient and will be resigned by Casi GIBSON with the correct patient information.    She verbally understood and had no further questions.

## 2025-04-25 ENCOUNTER — ON CAMPUS - OUTPATIENT (AMBULATORY)
Dept: URBAN - METROPOLITAN AREA HOSPITAL 77 | Facility: HOSPITAL | Age: 85
End: 2025-04-25
Payer: MEDICARE

## 2025-04-25 DIAGNOSIS — R19.7 DIARRHEA, UNSPECIFIED: ICD-10-CM

## 2025-04-25 DIAGNOSIS — K30 FUNCTIONAL DYSPEPSIA: ICD-10-CM

## 2025-04-25 DIAGNOSIS — K31.7 POLYP OF STOMACH AND DUODENUM: ICD-10-CM

## 2025-04-25 DIAGNOSIS — K57.30 DIVERTICULOSIS OF LARGE INTESTINE WITHOUT PERFORATION OR ABS: ICD-10-CM

## 2025-04-25 DIAGNOSIS — Z98.0 INTESTINAL BYPASS AND ANASTOMOSIS STATUS: ICD-10-CM

## 2025-04-25 DIAGNOSIS — K63.89 OTHER SPECIFIED DISEASES OF INTESTINE: ICD-10-CM

## 2025-04-25 PROCEDURE — 45380 COLONOSCOPY AND BIOPSY: CPT | Performed by: INTERNAL MEDICINE

## 2025-04-25 PROCEDURE — 43239 EGD BIOPSY SINGLE/MULTIPLE: CPT | Performed by: INTERNAL MEDICINE

## 2025-07-11 ENCOUNTER — OFFICE (AMBULATORY)
Dept: URBAN - METROPOLITAN AREA CLINIC 64 | Facility: CLINIC | Age: 85
End: 2025-07-11
Payer: MEDICARE

## 2025-07-11 VITALS
DIASTOLIC BLOOD PRESSURE: 75 MMHG | HEART RATE: 59 BPM | HEIGHT: 66 IN | SYSTOLIC BLOOD PRESSURE: 138 MMHG | WEIGHT: 233 LBS

## 2025-07-11 DIAGNOSIS — R19.4 CHANGE IN BOWEL HABIT: ICD-10-CM

## 2025-07-11 DIAGNOSIS — R10.32 LEFT LOWER QUADRANT PAIN: ICD-10-CM

## 2025-07-11 PROCEDURE — 99213 OFFICE O/P EST LOW 20 MIN: CPT | Performed by: NURSE PRACTITIONER

## 2025-07-11 RX ORDER — PANTOPRAZOLE 40 MG/1
40 TABLET, DELAYED RELEASE ORAL
Qty: 90 | Refills: 3 | Status: ACTIVE

## 2025-08-25 ENCOUNTER — HOSPITAL ENCOUNTER (OUTPATIENT)
Facility: HOSPITAL | Age: 85
Discharge: HOME | End: 2025-08-25
Attending: EMERGENCY MEDICINE | Admitting: EMERGENCY MEDICINE
Payer: MEDICARE

## 2025-08-25 VITALS
SYSTOLIC BLOOD PRESSURE: 175 MMHG | WEIGHT: 230 LBS | HEART RATE: 75 BPM | RESPIRATION RATE: 20 BRPM | TEMPERATURE: 98.1 F | DIASTOLIC BLOOD PRESSURE: 84 MMHG | OXYGEN SATURATION: 100 % | BODY MASS INDEX: 36.96 KG/M2 | HEIGHT: 66 IN

## 2025-08-25 DIAGNOSIS — G89.29 CHRONIC PAIN OF RIGHT KNEE: Primary | ICD-10-CM

## 2025-08-25 DIAGNOSIS — M25.561 CHRONIC PAIN OF RIGHT KNEE: Primary | ICD-10-CM

## 2025-08-25 PROCEDURE — 63710000001 PREDNISONE PER 1 MG: Performed by: EMERGENCY MEDICINE

## 2025-08-25 PROCEDURE — G0463 HOSPITAL OUTPT CLINIC VISIT: HCPCS | Performed by: EMERGENCY MEDICINE

## 2025-08-25 PROCEDURE — 25010000002 MORPHINE PER 10 MG: Performed by: EMERGENCY MEDICINE

## 2025-08-25 RX ORDER — METHYLPREDNISOLONE 4 MG/1
TABLET ORAL
Qty: 21 TABLET | Refills: 0 | Status: SHIPPED | OUTPATIENT
Start: 2025-08-25

## 2025-08-25 RX ORDER — PREDNISONE 20 MG/1
40 TABLET ORAL ONCE
Status: COMPLETED | OUTPATIENT
Start: 2025-08-25 | End: 2025-08-25

## 2025-08-25 RX ADMIN — PREDNISONE 40 MG: 20 TABLET ORAL at 20:02

## 2025-08-25 RX ADMIN — MORPHINE SULFATE 6 MG: 4 INJECTION, SOLUTION INTRAMUSCULAR; INTRAVENOUS at 20:02

## (undated) DEVICE — CVR HNDL LT CAM LB54

## (undated) DEVICE — SUT ETHIBOND EXCEL POLYSTR 1 CTX 30IN

## (undated) DEVICE — SPNG LAP PREWSH SFTPK 18X18IN STRL PK/5

## (undated) DEVICE — TUBING, SUCTION, 1/4" X 12', STRAIGHT: Brand: MEDLINE

## (undated) DEVICE — SUT SILK 3/0 SH CR8 30IN C017D

## (undated) DEVICE — COVER,MAYO STAND,STERILE: Brand: MEDLINE

## (undated) DEVICE — CVR HNDL LT SURG ACCSSRY BLU STRL

## (undated) DEVICE — ABDOMINAL BINDER: Brand: DEROYAL

## (undated) DEVICE — PENCL HND ROCKRSWTCH HOLSTR EZ CLEAN TP CRD 10FT

## (undated) DEVICE — TOWEL,OR,DSP,ST,WHITE,DLX,4/PK,20PK/CS: Brand: MEDLINE

## (undated) DEVICE — ENSEAL 20 CM SHAFT, LARGE JAW: Brand: ENSEAL X1

## (undated) DEVICE — GLV SURG BIOGEL SENSR LTX PF SZ7.5

## (undated) DEVICE — SOL IRRIG H2O 1000ML STRL

## (undated) DEVICE — GOWN,REINFORCE,POLY,SIRUS,BREATH SLV,XLG: Brand: MEDLINE

## (undated) DEVICE — TP SXN YANKR BULB STRL

## (undated) DEVICE — PROXIMATE RH ROTATING HEAD SKIN STAPLERS (35 WIDE) CONTAINS 35 STAINLESS STEEL STAPLES: Brand: PROXIMATE

## (undated) DEVICE — PK MAJ LAPAROTOMY 50